# Patient Record
Sex: FEMALE | Race: WHITE | NOT HISPANIC OR LATINO | Employment: OTHER | URBAN - METROPOLITAN AREA
[De-identification: names, ages, dates, MRNs, and addresses within clinical notes are randomized per-mention and may not be internally consistent; named-entity substitution may affect disease eponyms.]

---

## 2017-03-07 ENCOUNTER — OFFICE VISIT (OUTPATIENT)
Dept: FAMILY MEDICINE | Facility: CLINIC | Age: 62
End: 2017-03-07
Payer: COMMERCIAL

## 2017-03-07 VITALS
SYSTOLIC BLOOD PRESSURE: 136 MMHG | BODY MASS INDEX: 31.01 KG/M2 | HEIGHT: 63 IN | TEMPERATURE: 97.4 F | OXYGEN SATURATION: 99 % | RESPIRATION RATE: 18 BRPM | HEART RATE: 58 BPM | DIASTOLIC BLOOD PRESSURE: 82 MMHG | WEIGHT: 175 LBS

## 2017-03-07 DIAGNOSIS — J20.9 ACUTE BRONCHITIS, UNSPECIFIED ORGANISM: Primary | ICD-10-CM

## 2017-03-07 DIAGNOSIS — L30.8 OTHER ECZEMA: ICD-10-CM

## 2017-03-07 DIAGNOSIS — Z87.891 PERSONAL HISTORY OF TOBACCO USE, PRESENTING HAZARDS TO HEALTH: ICD-10-CM

## 2017-03-07 DIAGNOSIS — E78.00 HYPERCHOLESTEROLEMIA: ICD-10-CM

## 2017-03-07 DIAGNOSIS — J45.20 MILD INTERMITTENT ASTHMA, UNCOMPLICATED: Chronic | ICD-10-CM

## 2017-03-07 DIAGNOSIS — N18.30 CKD (CHRONIC KIDNEY DISEASE) STAGE 3, GFR 30-59 ML/MIN (H): ICD-10-CM

## 2017-03-07 DIAGNOSIS — E66.09 NON MORBID OBESITY DUE TO EXCESS CALORIES: ICD-10-CM

## 2017-03-07 DIAGNOSIS — F32.5 MAJOR DEPRESSION IN COMPLETE REMISSION (H): Chronic | ICD-10-CM

## 2017-03-07 PROCEDURE — 99214 OFFICE O/P EST MOD 30 MIN: CPT | Performed by: FAMILY MEDICINE

## 2017-03-07 NOTE — NURSING NOTE
"Chief Complaint   Patient presents with     URI     Recheck Medication     /82 (BP Location: Left arm, Patient Position: Chair, Cuff Size: Adult Regular)  Pulse 58  Temp 97.4  F (36.3  C) (Tympanic)  Resp 18  Ht 5' 3\" (1.6 m)  Wt 175 lb (79.4 kg)  LMP  (LMP Unknown)  SpO2 99%  Breastfeeding? No  BMI 31 kg/m2 Estimated body mass index is 31 kg/(m^2) as calculated from the following:    Height as of this encounter: 5' 3\" (1.6 m).    Weight as of this encounter: 175 lb (79.4 kg).  BP completed using cuff size: regular   Jenna Covington CMA    Health Maintenance Due   Topic Date Due     URINE DRUG SCREEN Q1 YR  [birth year]/04/1970     PHQ-9 Q6 MONTHS (NO INBASKET)  02/21/2017     ASTHMA CONTROL TEST Q6 MOS (NO INBASKET)  02/21/2017     Health Maintenance reviewed at today's visit patient asked to schedule/complete:   Asthma:  Patient agrees to schedule  Depression:  Patient agrees to schedule    "

## 2017-03-07 NOTE — PROGRESS NOTES
SUBJECTIVE:                                                    Ilana Shin is a 61 year old female who presents to clinic today for the following health issues:    ENT Symptoms             Symptoms: cc Present Absent Comment   Fever/Chills   x    Fatigue   x    Muscle Aches   x    Eye Irritation   x    Sneezing   x    Nasal Rosalio/Drg   x    Sinus Pressure/Pain   x    Loss of smell   x    Dental pain   x    Sore Throat   x    Swollen Glands   x    Ear Pain/Fullness   x    Cough  x  Am coryza    Wheeze   x    Chest Pain   x    Shortness of breath   x    Rash   x    Other   x      Symptom duration:  x 10 weeks left with only a cough after the 1st 2 weeks ; is getting better    Symptom severity:  Mild   Treatments tried:  OCT Decongestant; flonase    Contacts:  None     Medication Followup of Ambien    Taking Medication as prescribed: yes    Side Effects:  None    Medication Helping Symptoms:  yes     Asthma Follow-Up    Was ACT completed today?    Yes    ACT Total Scores 7/21/2015                                      3-7-17   ACT TOTAL SCORE 25                                          20   ASTHMA ER VISITS 0 = None   ASTHMA HOSPITALIZATIONS 0 = None     Counseling given: No        TOBACCO ABUSE    -[age]y/o at 1ppd   -30 pk yr   Brother who smoked is dying of lung ca        Amount of exercise or physical activity: work is physical, 13,000 steps a day    Problems taking medications regularly: No    Medication side effects: memory issues    Diet: regular (no restrictions)    Rash:Eczema of ext ear canal       Duration: yrs off& on    Description  Location: above  Itching: mild    Intensity:  moderate    Accompanying signs and symptoms: None    History (similar episodes/previous evaluation): None    Precipitating or alleviating factors:  New exposures:  None  Recent travel: no      Therapies tried and outcome: none     Hyperlipidemia:LDL Follow-Up      Rate your low fat/cholesterol diet?: not monitoring fat    Taking  "statin?  No    Other lipid medications/supplements?:  none       Depression and Anxiety Follow-Up      Status since last visit: No change-inremisson    Other associated symptoms:None    Complicating factors:     Significant life event: No     Current substance abuse: None    PHQ-9 SCORE 12/21/2015 10/24/2016 3/7/2017   Total Score - - -   Total Score 5 5 0     No flowsheet data found.     PHQ-9  English      PHQ-9   Any Language     GAD7     Chronic Kidney Disease:Stage 3  Follow-up      Current NSAID use?  No    NONMORBID OBESITY    -sedentary   -has CKD, hi LDL     Problem list and histories reviewed & adjusted, as indicated.  Additional history: as documented    Labs reviewed in EPIC    Reviewed and updated as needed this visit by clinical staff       Reviewed and updated as needed this visit by Provider       ROS:  C: NEGATIVE for fever, chills, change in weight  INTEGUMENTARY/SKIN: POSITIVE for  and rash scaley , red at ear canal openings   E: NEGATIVE for vision changes or irritation  E/M: NEGATIVE for ear, mouth and throat problems  RESP:POSITIVE for  and cough-productive  B: NEGATIVE for masses, tenderness or discharge  CV: NEGATIVE for chest pain, palpitations or peripheral edema  GI: NEGATIVE for nausea, abdominal pain, heartburn, or change in bowel habits  : NEGATIVE for frequency, dysuria, or hematuria  M: NEGATIVE for significant arthralgias or myalgia  N: NEGATIVE for weakness, dizziness or paresthesias  E: NEGATIVE for temperature intolerance, skin/hair changes  H: NEGATIVE for bleeding problems  P: NEGATIVE for changes in mood or affect    OBJECTIVE:                                                    /82 (BP Location: Left arm, Patient Position: Chair, Cuff Size: Adult Regular)  Pulse 58  Temp 97.4  F (36.3  C) (Tympanic)  Resp 18  Ht 5' 3\" (1.6 m)  Wt 175 lb (79.4 kg)  LMP  (LMP Unknown)  SpO2 99%  Breastfeeding? No  BMI 31 kg/m2  Body mass index is 31 kg/(m^2).  GENERAL: healthy, alert and " no distress;obese  EYES: Eyes grossly normal to inspection, PERRL and conjunctivae and sclerae normal  HENT: ear canals and TM's normal, nose and mouth without ulcers or lesions  NECK: no adenopathy, no asymmetry, masses, or scars and thyroid normal to palpation  RESP: lungs clear to auscultation - no rales, rhonchi or wheezes  CV: regular rate and rhythm, normal S1 S2, no S3 or S4, no murmur, click or rub, no peripheral edema and peripheral pulses strong  ABDOMEN: soft, nontender, no hepatosplenomegaly, no masses and bowel sounds normal  MS: no gross musculoskeletal defects noted, no edema  SKIN: no suspicious lesions or rashes--scaley red at openings of ear canals   NEURO: Normal strength and tone, mentation intact and speech normal  PSYCH: mentation appears normal, affect normal/bright    Diagnostic Test Results:  none      ASSESSMENT/PLAN:                                                              ICD-10-CM    1. Acute bronchitis, unspecified organism J20.9    2. Mild intermittent asthma, uncomplicated J45.20    3. Personal history of tobacco use, presenting hazards to health 1 ppd  [age]y/o=30 pk yr hx  Z87.891    4. Other eczema-ear canals L30.8    5. Major depression in complete remission (H) on meds  F32.5    6. Non morbid obesity due to excess calories s/po gastric bypss 2013 E66.09    7. CKD (chronic kidney disease) stage 3, GFR 30-59 ml/min N18.3    8. Hypercholesterolemia E78.00        Patient Instructions   1.  Weight Loss Tips  1. Do not eat after 6 hrs before your expected bedtime  2. Have your heaviest meal for breakfast, a slightly lighter meal at lunch and a snack 6 hrs before bed  3. No sugar/calorie drinks except milk ie no fruit juice, pop, alcohol.  4. Drink milk 30min before meals to decrease your hunger. Also it is excellent as part of your last meal of the day snack  5. Drink lots of water  6. Increase fiber in diet: all bran cereal, salads, popcorn etc  7. Have only one small serving of  fruit a day about 1/2 cup (as this is high in sugar)  8. EXERCISE is the bottom line. Without it, you will gain weight even on a low calorie diet. Best if done 2-3X a day as can    Being overweight contributes to high blood pressure and high cholesterol, both of which cause heart attacks, strokes and kidney failure, prediabetes and diabetes, arthritis, and liver disease     2. Boil water and breath the warm vapors 2-3 times a day to try to open up the sinuses. Run a steamer or cold air vaporizer as much as possible. Take Mucinex plain blue  1200 mg (This may come as 600mg/tablet and you need to take 2 tabs twice a day) twice a day. Mucinex is guaifenesin, the major component of most cough syrups, because it makes the mucus less thick, and therefore it drains out better and you are less likely to cough from it dripping on the back of your throat.  Irrigate the  nose with plain water under the kitchen sink faucet or the shower.  Melissa pots, spray bottles, etc accumulate bacteria and are not recommended.   The tickle in the throat is also helped by gargling with vinegar and honey mixture, or pop or mouth wash as these coat the throat.  Please try to rinse teeth with water after using these .     3. As you have a 30 pk yr hx smoking , chek with insurance re the cost of the low dose CT for lung cancer     4.Discussed all with pt  And the patient expresses understanding  That this is likely an exacerbation of the beginning of COPD  And not a cold or bacteria that is treatable     Has a 30 pk yr hx of smoking     Pt need s to chek on lo dose CT  For the risk of cancer     Will prob get better this summer  And may not be ill again till next winter      i would not chek  A spirometry now as would be abnormal as has the cough but would consider in the future when you are healthy to see what the lungs function is       Mckenzie Reyes MD  [facility]    Discussed all with pt  And the patient  expresses understanding  That this is likely an exacerbation of the beginning of COPD  And not a cold or bacteria that is treatable     Has a 30 pk yr hx of smoking   Unlikely this is asthma with her 30 yr smoking hx     Pt need s to chek on lo dose CT  For the risk of cancer     Will prob get better this summer  And may not be ill again till next winter      i would not chek  A spirometry now as would be abnormal as has the cough but would consider in the future when you are healthy to see what the lungs function is     Weight management plan: Discussed healthy diet and exercise guidelines and patient will follow up in 3 months in clinic to re-evaluate.    Mckenzie Reyes MD

## 2017-03-07 NOTE — MR AVS SNAPSHOT
After Visit Summary   3/7/2017    Ilana Shin    MRN: [id number]           Patient Information     Date Of Birth          1955        Visit Information        Provider Department      3/7/2017 10:00 AM Mckenzie Reyes MD [facility]        Today's Diagnoses     Major depression in complete remission (H) on meds     -  1    Non morbid obesity due to excess calories s/po gastric bypss 2013          Care Instructions    1.  Weight Loss Tips  1. Do not eat after 6 hrs before your expected bedtime  2. Have your heaviest meal for breakfast, a slightly lighter meal at lunch and a snack 6 hrs before bed  3. No sugar/calorie drinks except milk ie no fruit juice, pop, alcohol.  4. Drink milk 30min before meals to decrease your hunger. Also it is excellent as part of your last meal of the day snack  5. Drink lots of water  6. Increase fiber in diet: all bran cereal, salads, popcorn etc  7. Have only one small serving of fruit a day about 1/2 cup (as this is high in sugar)  8. EXERCISE is the bottom line. Without it, you will gain weight even on a low calorie diet. Best if done 2-3X a day as can    Being overweight contributes to high blood pressure and high cholesterol, both of which cause heart attacks, strokes and kidney failure, prediabetes and diabetes, arthritis, and liver disease     2. Boil water and breath the warm vapors 2-3 times a day to try to open up the sinuses. Run a steamer or cold air vaporizer as much as possible. Take Mucinex plain blue  1200 mg (This may come as 600mg/tablet and you need to take 2 tabs twice a day) twice a day. Mucinex is guaifenesin, the major component of most cough syrups, because it makes the mucus less thick, and therefore it drains out better and you are less likely to cough from it dripping on the back of your throat.  Irrigate the  nose with plain water under the kitchen sink faucet or the shower.  Melissa pots, spray  bottles, etc accumulate bacteria and are not recommended.   The tickle in the throat is also helped by gargling with vinegar and honey mixture, or pop or mouth wash as these coat the throat.  Please try to rinse teeth with water after using these .     3. As you have a 30 pk yr hx smoking , chek with insurance re the cost of the low dose CT for lung cancer     4.Discussed all with pt  And the patient expresses understanding  That this is likely an exacerbation of the beginning of COPD  And not a cold or bacteria that is treatable     Has a 30 pk yr hx of smoking     Pt need s to chek on lo dose CT  For the risk of cancer     Will prob get better this summer  And may not be ill again till next winter      i would not chek  A spirometry now as would be abnormal as has the cough but would consider in the future when you are healthy to see what the lungs function is         Follow-ups after your visit        Who to contact     If you have questions or need follow up information about today's clinic visit or your schedule please contact [facility] directly at [phone].  Normal or non-critical lab and imaging results will be communicated to you by Vinylminthart, letter or phone within 4 business days after the clinic has received the results. If you do not hear from us within 7 days, please contact the clinic through Trailerpopt or phone. If you have a critical or abnormal lab result, we will notify you by phone as soon as possible.  Submit refill requests through Multi[employer] or call your pharmacy and they will forward the refill request to us. Please allow 3 business days for your refill to be completed.          Additional Information About Your Visit        Multi[employer]om Information     [employer] gives you secure access to your electronic health record. If you see a primary care provider, you can also send messages to your care team and make appointments. If you have questions, please call your primary care  "clinic.  If you do not have a primary care provider, please call [phone] and they will assist you.        Care EveryWhere ID     This is your Care EveryWhere ID. This could be used by other organizations to access your [facility] medical records  [id number]        Your Vitals Were     Pulse Temperature Respirations Height Last Period Pulse Oximetry    58 97.4  F (36.3  C) (Tympanic) 18 5' 3\" (1.6 m) (LMP Unknown) 99%    Breastfeeding? BMI (Body Mass Index)                No 31 kg/m2           Blood Pressure from Last 3 Encounters:   03/07/17 136/82   12/06/16 138/75   06/20/16 136/90    Weight from Last 3 Encounters:   03/07/17 175 lb (79.4 kg)   12/06/16 175 lb (79.4 kg)   06/20/16 170 lb (77.1 kg)              Today, you had the following     No orders found for display       Primary Care Provider Office Phone # Fax #    [facility] [phone] [address] [facility] [address]        Thank you!     Thank you for choosing [facility]  for your care. Our goal is always to provide you with excellent care. Hearing back from our patients is one way we can continue to improve our services. Please take a few minutes to complete the written survey that you may receive in the mail after your visit with us. Thank you!             Your Updated Medication List - Protect others around you: Learn how to safely use, store and throw away your medicines at www.disposemymeds.org.          This list is accurate as of: 3/7/17 10:56 AM.  Always use your most recent med list.                   Brand Name Dispense Instructions for use    albuterol 108 (90 BASE) MCG/ACT Inhaler    PROAIR HFA/PROVENTIL HFA/VENTOLIN HFA    1 Inhaler    Inhale 1-2 puffs into the lungs every 4 hours as needed for shortness of breath / dyspnea       ALEVE PO          cyanocolbalamin 500 MCG tablet    vitamin  B-12     Take 2 tablets by mouth daily Chews       fluticasone 50 " MCG/ACT spray    FLONASE    1 Package    Spray 1-2 sprays into both nostrils every evening       levothyroxine 125 MCG tablet    SYNTHROID/LEVOTHROID    90 tablet    TAKE 1 TABLET BY MOUTH DAILY       vitamin D 2000 UNITS Caps      Take 1 capsule by mouth daily       zolpidem 5 MG tablet    AMBIEN    10 tablet    Take 1 tablet (5 mg) by mouth nightly as needed for sleep

## 2017-03-07 NOTE — PATIENT INSTRUCTIONS
1.  Weight Loss Tips  1. Do not eat after 6 hrs before your expected bedtime  2. Have your heaviest meal for breakfast, a slightly lighter meal at lunch and a snack 6 hrs before bed  3. No sugar/calorie drinks except milk ie no fruit juice, pop, alcohol.  4. Drink milk 30min before meals to decrease your hunger. Also it is excellent as part of your last meal of the day snack  5. Drink lots of water  6. Increase fiber in diet: all bran cereal, salads, popcorn etc  7. Have only one small serving of fruit a day about 1/2 cup (as this is high in sugar)  8. EXERCISE is the bottom line. Without it, you will gain weight even on a low calorie diet. Best if done 2-3X a day as can    Being overweight contributes to high blood pressure and high cholesterol, both of which cause heart attacks, strokes and kidney failure, prediabetes and diabetes, arthritis, and liver disease     2. Boil water and breath the warm vapors 2-3 times a day to try to open up the sinuses. Run a steamer or cold air vaporizer as much as possible. Take Mucinex plain blue  1200 mg (This may come as 600mg/tablet and you need to take 2 tabs twice a day) twice a day. Mucinex is guaifenesin, the major component of most cough syrups, because it makes the mucus less thick, and therefore it drains out better and you are less likely to cough from it dripping on the back of your throat.  Irrigate the  nose with plain water under the kitchen sink faucet or the shower.  Melissa pots, spray bottles, etc accumulate bacteria and are not recommended.   The tickle in the throat is also helped by gargling with vinegar and honey mixture, or pop or mouth wash as these coat the throat.  Please try to rinse teeth with water after using these .     3. As you have a 30 pk yr hx smoking , chek with insurance re the cost of the low dose CT for lung cancer     4.Discussed all with pt  And the patient expresses understanding  That this is likely an exacerbation of the beginning of  COPD  And not a cold or bacteria that is treatable     Has a 30 pk yr hx of smoking     Pt need s to chek on lo dose CT  For the risk of cancer     Will prob get better this summer  And may not be ill again till next winter      i would not chek  A spirometry now as would be abnormal as has the cough but would consider in the future when you are healthy to see what the lungs function is

## 2017-03-08 ASSESSMENT — ASTHMA QUESTIONNAIRES: ACT_TOTALSCORE: 20

## 2017-03-08 ASSESSMENT — PATIENT HEALTH QUESTIONNAIRE - PHQ9: SUM OF ALL RESPONSES TO PHQ QUESTIONS 1-9: 0

## 2017-03-14 DIAGNOSIS — G47.00 INSOMNIA, UNSPECIFIED TYPE: ICD-10-CM

## 2017-03-16 RX ORDER — ZOLPIDEM TARTRATE 5 MG/1
TABLET ORAL
Qty: 10 TABLET | Refills: 2 | Status: SHIPPED | OUTPATIENT
Start: 2017-03-16 | End: 2017-03-27

## 2017-03-16 NOTE — TELEPHONE ENCOUNTER
zolpidem (AMBIEN) 5 MG tablet    Last Written Prescription Date:  12/06/02/16  Last Fill Quantity: 10,   # refills: 2  Last Office Visit with [facility] or [facility] prescribing provider: 03/07/2017  Future Office visit:       Routing refill request to provider for review/approval because:  Drug not on the [facility], [facility] or [facility] refill protocol or controlled substance

## 2017-03-27 ENCOUNTER — MYC MEDICAL ADVICE (OUTPATIENT)
Dept: FAMILY MEDICINE | Facility: CLINIC | Age: 62
End: 2017-03-27

## 2017-03-27 DIAGNOSIS — G47.00 INSOMNIA, UNSPECIFIED TYPE: ICD-10-CM

## 2017-03-27 NOTE — TELEPHONE ENCOUNTER
Patient requests Ambien switched from 10 tabs a month to 30 tabs a month.     zolpidem (AMBIEN) 5 MG tablet    Last Written Prescription Date:  3/16/17  Last Fill Quantity: 10,   # refills: 2  Last Office Visit with [facility], [facility] or [facility] prescribing provider: 3/7/17   Future Office visit:       Routing refill request to provider for review/approval because:  Drug not on the [facility], [facility] or [facility] refill protocol or controlled substance

## 2017-03-28 RX ORDER — ZOLPIDEM TARTRATE 5 MG/1
5 TABLET ORAL
Qty: 30 TABLET | Refills: 2 | Status: SHIPPED | OUTPATIENT
Start: 2017-03-28 | End: 2017-06-26

## 2017-04-28 ENCOUNTER — OFFICE VISIT (OUTPATIENT)
Dept: URGENT CARE | Facility: URGENT CARE | Age: 62
End: 2017-04-28
Payer: COMMERCIAL

## 2017-04-28 VITALS
WEIGHT: 175 LBS | BODY MASS INDEX: 31 KG/M2 | OXYGEN SATURATION: 100 % | HEART RATE: 59 BPM | SYSTOLIC BLOOD PRESSURE: 150 MMHG | DIASTOLIC BLOOD PRESSURE: 70 MMHG | TEMPERATURE: 98.6 F

## 2017-04-28 DIAGNOSIS — J01.90 ACUTE SINUSITIS WITH SYMPTOMS > 10 DAYS: Primary | ICD-10-CM

## 2017-04-28 DIAGNOSIS — J45.909 UNCOMPLICATED ASTHMA, UNSPECIFIED ASTHMA SEVERITY: ICD-10-CM

## 2017-04-28 DIAGNOSIS — J20.9 ACUTE BRONCHITIS WITH SYMPTOMS > 10 DAYS: ICD-10-CM

## 2017-04-28 PROCEDURE — 99213 OFFICE O/P EST LOW 20 MIN: CPT | Performed by: FAMILY MEDICINE

## 2017-04-28 RX ORDER — AZITHROMYCIN 250 MG/1
TABLET, FILM COATED ORAL
Qty: 6 TABLET | Refills: 0 | Status: SHIPPED | OUTPATIENT
Start: 2017-04-28 | End: 2017-05-03

## 2017-04-28 NOTE — PROGRESS NOTES
SUBJECTIVE: Ilana Shin is a 61 year old female here with concerns about sinus infection.  She states onset  of symptoms were greater than 10 days with sinus pressure and drainage.  Course of illness is worsening.    Severity: moderate  Current and Associated symptoms: cold symptoms  Predisposing factors include none.   Recent treatment has included: OTC's  Allergic symptoms include: none    Past Medical History:   Diagnosis Date     Conductive hearing loss, bilateral     chronic since 2010 right ear     Fibromyalgia 3/28/2013     Insomnia, unspecified     whole life     Major depression      Mitral valve stenosis and aortic valve stenosis 9/22/2012     S/P gastric bypass 2006     Vitamin D deficiency 3/30/2013     Allergies   Allergen Reactions     Codeine Sulfate GI Disturbance     Social History   Substance Use Topics     Smoking status: Former Smoker     Smokeless tobacco: Former User     Quit date: 1/1/2007     Alcohol use No       ROS:   no rash  no vomiting    OBJECTIVE:  /70 (BP Location: Right arm, Patient Position: Chair, Cuff Size: Adult Regular)  Pulse 59  Temp 98.6  F (37  C) (Oral)  Wt 175 lb (79.4 kg)  LMP  (LMP Unknown)  SpO2 100%  BMI 31 kg/m2  NAD  EYES: clear, no mattering  EARS: TM's clear, no redness/buldging, canals clear, no swelling/redness  NOSE: discolored discharge jimy. Nares  THROAT: clear, no erythema, no exudate  SINUS: maxillary tenderness with palpation  LUNGS: CTAB, no rhonchi/wheeze/rales      ICD-10-CM    1. Acute sinusitis with symptoms > 10 days J01.90 azithromycin (ZITHROMAX) 250 MG tablet       Follow up with primary clinic if not improving

## 2017-04-28 NOTE — MR AVS SNAPSHOT
After Visit Summary   4/28/2017    Ilana Shin    MRN: [id number]           Patient Information     Date Of Birth          1955        Visit Information        Provider Department      4/28/2017 1:15 PM Sreekanth Fried DO [facility]        Today's Diagnoses     Acute sinusitis with symptoms > 10 days    -  1    Uncomplicated asthma, unspecified asthma severity        Acute bronchitis with symptoms > 10 days           Follow-ups after your visit        Who to contact     If you have questions or need follow up information about today's clinic visit or your schedule please contact [facility] directly at [phone].  Normal or non-critical lab and imaging results will be communicated to you by MyChart, letter or phone within 4 business days after the clinic has received the results. If you do not hear from us within 7 days, please contact the clinic through "Mobile Location, IP"hart or phone. If you have a critical or abnormal lab result, we will notify you by phone as soon as possible.  Submit refill requests through [employer] or call your pharmacy and they will forward the refill request to us. Please allow 3 business days for your refill to be completed.          Additional Information About Your Visit        MyChart Information     [employer] gives you secure access to your electronic health record. If you see a primary care provider, you can also send messages to your care team and make appointments. If you have questions, please call your primary care clinic.  If you do not have a primary care provider, please call [phone] and they will assist you.        Care EveryWhere ID     This is your Care EveryWhere ID. This could be used by other organizations to access your [place] medical records  [id number]        Your Vitals Were     Pulse Temperature Last Period Pulse Oximetry BMI (Body Mass Index)       59 98.6  F (37  C) (Oral) (LMP Unknown) 100% 31  kg/m2        Blood Pressure from Last 3 Encounters:   04/28/17 150/70   03/07/17 136/82   12/06/16 138/75    Weight from Last 3 Encounters:   04/28/17 175 lb (79.4 kg)   03/07/17 175 lb (79.4 kg)   12/06/16 175 lb (79.4 kg)              Today, you had the following     No orders found for display         Today's Medication Changes          These changes are accurate as of: 4/28/17  1:38 PM.  If you have any questions, ask your nurse or doctor.               Start taking these medicines.        Dose/Directions    azithromycin 250 MG tablet   Commonly known as:  ZITHROMAX   Used for:  Acute sinusitis with symptoms > 10 days   Started by:  Sreekanth Fried, DO        Two tablets first day, then one tablet daily for four days.   Quantity:  6 tablet   Refills:  0       fluticasone-salmeterol 250-50 MCG/DOSE diskus inhaler   Commonly known as:  ADVAIR   Used for:  Uncomplicated asthma, unspecified asthma severity, Acute bronchitis with symptoms > 10 days   Started by:  Sreekanth Fried,         Dose:  1 puff   Inhale 1 puff into the lungs 2 times daily   Quantity:  1 Inhaler   Refills:  0            Where to get your medicines      These medications were sent to [facility] [id number] [place] [address] [address] AT [place] [id number] [facility] [address]    Hours:  24-hours Phone:  [phone]     azithromycin 250 MG tablet    fluticasone-salmeterol 250-50 MCG/DOSE diskus inhaler                Primary Care Provider Office Phone # Fax #    [facility] [phone] [address] [facility] [address]        Thank you!     Thank you for choosing [facility]  for your care. Our goal is always to provide you with excellent care. Hearing back from our patients is one way we can continue to improve our services. Please take a few minutes to complete the written survey that you may receive in the mail after your  visit with us. Thank you!             Your Updated Medication List - Protect others around you: Learn how to safely use, store and throw away your medicines at www.disposemymeds.org.          This list is accurate as of: 4/28/17  1:38 PM.  Always use your most recent med list.                   Brand Name Dispense Instructions for use    albuterol 108 (90 BASE) MCG/ACT Inhaler    PROAIR HFA/PROVENTIL HFA/VENTOLIN HFA    1 Inhaler    Inhale 1-2 puffs into the lungs every 4 hours as needed for shortness of breath / dyspnea       ALEVE PO      Reported on 4/28/2017       azithromycin 250 MG tablet    ZITHROMAX    6 tablet    Two tablets first day, then one tablet daily for four days.       cyanocolbalamin 500 MCG tablet    vitamin  B-12     Take 2 tablets by mouth daily Chews       fluticasone 50 MCG/ACT spray    FLONASE    1 Package    Spray 1-2 sprays into both nostrils every evening       fluticasone-salmeterol 250-50 MCG/DOSE diskus inhaler    ADVAIR    1 Inhaler    Inhale 1 puff into the lungs 2 times daily       levothyroxine 125 MCG tablet    SYNTHROID/LEVOTHROID    90 tablet    TAKE 1 TABLET BY MOUTH DAILY       vitamin D 2000 UNITS Caps      Take 1 capsule by mouth daily       zolpidem 5 MG tablet    AMBIEN    30 tablet    Take 1 tablet (5 mg) by mouth nightly as needed

## 2017-05-30 DIAGNOSIS — J31.0 CHRONIC RHINITIS: ICD-10-CM

## 2017-05-31 RX ORDER — FLUTICASONE PROPIONATE 50 MCG
SPRAY, SUSPENSION (ML) NASAL
Qty: 16 ML | Refills: 9 | Status: SHIPPED | OUTPATIENT
Start: 2017-05-31 | End: 2017-10-05

## 2017-05-31 NOTE — TELEPHONE ENCOUNTER
FLUTICASONE 50MCG JOSÉ SP (120SP)RX    Last Written Prescription Date: 07/21/2015  Last Fill Quantity: 1,  # refills: 11   Last Office Visit with [facility], [facility] or [facility] prescribing provider: 03/17/2017

## 2017-06-08 ENCOUNTER — TELEPHONE (OUTPATIENT)
Dept: FAMILY MEDICINE | Facility: CLINIC | Age: 62
End: 2017-06-08

## 2017-06-08 DIAGNOSIS — E66.09 NON MORBID OBESITY DUE TO EXCESS CALORIES: Primary | ICD-10-CM

## 2017-06-08 DIAGNOSIS — N18.30 CKD (CHRONIC KIDNEY DISEASE) STAGE 3, GFR 30-59 ML/MIN (H): ICD-10-CM

## 2017-06-08 DIAGNOSIS — E78.00 HYPERCHOLESTEROLEMIA: ICD-10-CM

## 2017-06-08 DIAGNOSIS — E03.9 HYPOTHYROIDISM, UNSPECIFIED TYPE: ICD-10-CM

## 2017-06-08 LAB
ANION GAP SERPL CALCULATED.3IONS-SCNC: 10 MMOL/L (ref 3–14)
BUN SERPL-MCNC: 10 MG/DL (ref 7–30)
CALCIUM SERPL-MCNC: 8.7 MG/DL (ref 8.5–10.1)
CHLORIDE SERPL-SCNC: 109 MMOL/L (ref 94–109)
CHOLEST SERPL-MCNC: 184 MG/DL
CO2 SERPL-SCNC: 22 MMOL/L (ref 20–32)
CREAT SERPL-MCNC: 0.94 MG/DL (ref 0.52–1.04)
GFR SERPL CREATININE-BSD FRML MDRD: 60 ML/MIN/1.7M2
GLUCOSE SERPL-MCNC: 98 MG/DL (ref 70–99)
HDLC SERPL-MCNC: 75 MG/DL
LDLC SERPL CALC-MCNC: 94 MG/DL
NONHDLC SERPL-MCNC: 109 MG/DL
POTASSIUM SERPL-SCNC: 3.6 MMOL/L (ref 3.4–5.3)
SODIUM SERPL-SCNC: 141 MMOL/L (ref 133–144)
T4 FREE SERPL-MCNC: 1.35 NG/DL (ref 0.76–1.46)
TRIGL SERPL-MCNC: 76 MG/DL
TSH SERPL DL<=0.005 MIU/L-ACNC: 0.06 MU/L (ref 0.4–4)

## 2017-06-08 PROCEDURE — 80061 LIPID PANEL: CPT | Performed by: PHYSICIAN ASSISTANT

## 2017-06-08 PROCEDURE — 84439 ASSAY OF FREE THYROXINE: CPT | Performed by: PHYSICIAN ASSISTANT

## 2017-06-08 PROCEDURE — 36415 COLL VENOUS BLD VENIPUNCTURE: CPT | Performed by: PHYSICIAN ASSISTANT

## 2017-06-08 PROCEDURE — 80048 BASIC METABOLIC PNL TOTAL CA: CPT | Performed by: PHYSICIAN ASSISTANT

## 2017-06-08 PROCEDURE — 84443 ASSAY THYROID STIM HORMONE: CPT | Performed by: PHYSICIAN ASSISTANT

## 2017-06-08 NOTE — TELEPHONE ENCOUNTER
Joni drew a lipid and basic on this patient when she came in today, and was wondering if you could order these for her. Please advise, thanks!

## 2017-06-09 ENCOUNTER — TELEPHONE (OUTPATIENT)
Dept: FAMILY MEDICINE | Facility: CLINIC | Age: 62
End: 2017-06-09

## 2017-06-09 DIAGNOSIS — E03.4 HYPOTHYROIDISM DUE TO ACQUIRED ATROPHY OF THYROID: Primary | ICD-10-CM

## 2017-06-09 NOTE — TELEPHONE ENCOUNTER
Ilana's labs are back and she has improved cholesterol, improved kidney function though is still slightly low. Her TSH is low, suggesting her dose of levothyroxine may be too high. I recommend reducing that and rechecking in 6-8 weeks. Nicole Joy Siegler, PA-C

## 2017-06-09 NOTE — TELEPHONE ENCOUNTER
I spoke with Ilana and did give her the option to maintain her dose if she felt well vs reduce and recheck; she would like to continue with her current dose and recheck in 3-6 months. Future orders provided. Nicole Joy Siegler, PA-C

## 2017-06-09 NOTE — TELEPHONE ENCOUNTER
I LVM for Ilana to call us back so we can discuss her labs and reduce her levothyroxine dose. Nicole Joy Siegler, PA-C

## 2017-06-09 NOTE — LETTER
[facility]  [address]  [facility] [address] [phone]                                                                                                           Ilana Shin  [address]  [facility] [address]    June 9, 2017      Dear Ilana,    Your recent labs are below. Continue with current plans as discussed via telephone.     Results for orders placed or performed in visit on 06/08/17   **TSH with free T4 reflex FUTURE anytime   Result Value Ref Range    TSH 0.06 (L) 0.40 - 4.00 mU/L   Lipid Profile with reflex to direct LDL   Result Value Ref Range    Cholesterol 184 <200 mg/dL    Triglycerides 76 <150 mg/dL    HDL Cholesterol 75 >49 mg/dL    LDL Cholesterol Calculated 94 <100 mg/dL    Non HDL Cholesterol 109 <130 mg/dL   Basic metabolic panel  (Ca, Cl, CO2, Creat, Gluc, K, Na, BUN)   Result Value Ref Range    Sodium 141 133 - 144 mmol/L    Potassium 3.6 3.4 - 5.3 mmol/L    Chloride 109 94 - 109 mmol/L    Carbon Dioxide 22 20 - 32 mmol/L    Anion Gap 10 3 - 14 mmol/L    Glucose 98 70 - 99 mg/dL    Urea Nitrogen 10 7 - 30 mg/dL    Creatinine 0.94 0.52 - 1.04 mg/dL    GFR Estimate 60 (L) >60 mL/min/1.7m2    GFR Estimate If Black 73 >60 mL/min/1.7m2    Calcium 8.7 8.5 - 10.1 mg/dL   T4 free   Result Value Ref Range    T4 Free 1.35 0.76 - 1.46 ng/dL                   Thank you for choosing [facility].  We appreciate the opportunity to serve you and look forward to supporting your healthcare needs in the future.    If you have any questions or concerns, please call me or my staff at [phone].      Sincerely,        Nicole Joy Siegler, PA-C

## 2017-06-26 DIAGNOSIS — G47.00 INSOMNIA, UNSPECIFIED TYPE: ICD-10-CM

## 2017-06-27 RX ORDER — ZOLPIDEM TARTRATE 5 MG/1
TABLET ORAL
Qty: 30 TABLET | Refills: 0 | Status: SHIPPED | OUTPATIENT
Start: 2017-06-27 | End: 2017-07-29

## 2017-06-27 NOTE — TELEPHONE ENCOUNTER
ZOLPIDEM 5MG TABLETS    Last Written Prescription Date:  03282-17  Last Fill Quantity: 30,   # refills: 2  Last Office Visit with [facility], [facility] or [facility] prescribing provider: 03/07/2017  Future Office visit:       Routing refill request to provider for review/approval because:  Drug not on the [facility], [facility] or [facility] refill protocol or controlled substance

## 2017-06-27 NOTE — TELEPHONE ENCOUNTER
Per my visit with her on 12/2016: Discussed ambien use as not a long term solution and unsafe for daily use based upon her age and lifestyle. She will use <10 tabs per month when she is able to sleep 8-10 hours. She is aware of hyponotic side effects and potential for falls and sleep walking. She is also aware that it would be unsafe to take within 8 hours of driving or caring for a child. She agrees to return if she requires further refill  Beyond the #10 +2 refills and that it should NOT be prior to 3 months from now.     If she is requiring this medication daily that is a change from above. I would like more information about why she is now using this daily. This is not a safe plan to use daily long term.     I will fill #30 pills with zero refills pending the above update via telephone or in person.   Thanks  Nicole Joy Siegler, PA-C

## 2017-07-29 DIAGNOSIS — G47.00 INSOMNIA, UNSPECIFIED TYPE: ICD-10-CM

## 2017-07-31 NOTE — TELEPHONE ENCOUNTER
Zolpidem 5 mg      Last Written Prescription Date:  6/27/17  Last Fill Quantity: 30,   # refills: 0  Last Office Visit with [facility], [facility] or  [facility] prescribing provider: 3/7/17  Future Office visit:       Routing refill request to provider for review/approval because:  Drug not on the [facility], [facility] or  [facility] refill protocol or controlled substance

## 2017-08-01 NOTE — TELEPHONE ENCOUNTER
Controlled Substance Refill Request for zolpidem (AMBIEN) 5 MG tablet  Problem List Complete:  No     PROVIDER TO CONSIDER COMPLETION OF PROBLEM LIST AND OVERVIEW/CONTROLLED SUBSTANCE AGREEMENT    Controlled substance agreement on file: No.     Processing:  Fax Rx to [facility] pharmacy     checked in past 6 months?  No, route to RN     [place] reviewed:  reviewed- no concerns    [place]  [email or web address]

## 2017-08-02 RX ORDER — ZOLPIDEM TARTRATE 5 MG/1
TABLET ORAL
Qty: 30 TABLET | Refills: 0 | Status: SHIPPED | OUTPATIENT
Start: 2017-08-02 | End: 2017-09-01

## 2017-08-02 NOTE — TELEPHONE ENCOUNTER
Please notify her that she should follow up with one of us before she needs another refill, her use seems to be increasing? Just want to have her sign a CSA and make an appropriate plan with a provider.   Thanks! Nicole Joy Siegler, PA-C

## 2017-08-02 NOTE — TELEPHONE ENCOUNTER
Patient was notified of need for med check appointment before next refill. Script was faxed to the pharmacy as requested.    Melissa Carrero LPN

## 2017-08-10 DIAGNOSIS — E03.9 HYPOTHYROIDISM, UNSPECIFIED TYPE: ICD-10-CM

## 2017-08-10 RX ORDER — LEVOTHYROXINE SODIUM 125 UG/1
TABLET ORAL
Qty: 90 TABLET | Refills: 0 | Status: SHIPPED | OUTPATIENT
Start: 2017-08-10 | End: 2017-10-12

## 2017-08-10 NOTE — TELEPHONE ENCOUNTER
levothyroxine (SYNTHROID/LEVOTHROID) 125 MCG tablet     Last Written Prescription Date: 5/15/17  Last Quantity: 90, # refills: 0  Last Office Visit with G, P or [facility] prescribing provider: 3/7/17   Next 5 appointments (look out 90 days)     Aug 15, 2017  8:15 AM CDT   [facility] Visit with [facility]ma Rodgers MD   [facility]    [address] [address] [phone]                   TSH   Date Value Ref Range Status   06/08/2017 0.06 (L) 0.40 - 4.00 mU/L Final

## 2017-08-10 NOTE — TELEPHONE ENCOUNTER
Routing refill request to provider for review/approval because:  Labs out of range:  TSH 0.06   Labs not current:  06/08/2017  FYI-Patient has future appt scheduled 08/15/2017

## 2017-08-15 ENCOUNTER — OFFICE VISIT (OUTPATIENT)
Dept: INTERNAL MEDICINE | Facility: CLINIC | Age: 62
End: 2017-08-15
Payer: COMMERCIAL

## 2017-08-15 VITALS
DIASTOLIC BLOOD PRESSURE: 78 MMHG | HEART RATE: 46 BPM | BODY MASS INDEX: 29.97 KG/M2 | OXYGEN SATURATION: 98 % | TEMPERATURE: 97.8 F | WEIGHT: 169.2 LBS | SYSTOLIC BLOOD PRESSURE: 165 MMHG

## 2017-08-15 DIAGNOSIS — I10 BENIGN ESSENTIAL HYPERTENSION: ICD-10-CM

## 2017-08-15 DIAGNOSIS — Z76.89 ENCOUNTER TO ESTABLISH CARE: Primary | ICD-10-CM

## 2017-08-15 DIAGNOSIS — Z12.39 SCREENING FOR BREAST CANCER: ICD-10-CM

## 2017-08-15 DIAGNOSIS — Z98.84 S/P GASTRIC BYPASS: ICD-10-CM

## 2017-08-15 DIAGNOSIS — Z12.11 SCREEN FOR COLON CANCER: ICD-10-CM

## 2017-08-15 DIAGNOSIS — E03.9 HYPOTHYROIDISM, UNSPECIFIED TYPE: ICD-10-CM

## 2017-08-15 DIAGNOSIS — Z78.0 ASYMPTOMATIC MENOPAUSE: ICD-10-CM

## 2017-08-15 DIAGNOSIS — Z23 NEED FOR VACCINATION: ICD-10-CM

## 2017-08-15 PROBLEM — E66.09 NON MORBID OBESITY DUE TO EXCESS CALORIES: Status: RESOLVED | Noted: 2017-03-07 | Resolved: 2017-08-15

## 2017-08-15 PROBLEM — Z87.891 PERSONAL HISTORY OF TOBACCO USE, PRESENTING HAZARDS TO HEALTH: Status: RESOLVED | Noted: 2017-03-07 | Resolved: 2017-08-15

## 2017-08-15 PROBLEM — N18.30 CKD (CHRONIC KIDNEY DISEASE) STAGE 3, GFR 30-59 ML/MIN (H): Status: RESOLVED | Noted: 2017-03-07 | Resolved: 2017-08-15

## 2017-08-15 PROBLEM — L30.8 OTHER ECZEMA: Status: RESOLVED | Noted: 2017-03-07 | Resolved: 2017-08-15

## 2017-08-15 PROCEDURE — 90471 IMMUNIZATION ADMIN: CPT | Performed by: INTERNAL MEDICINE

## 2017-08-15 PROCEDURE — 99214 OFFICE O/P EST MOD 30 MIN: CPT | Mod: 25 | Performed by: INTERNAL MEDICINE

## 2017-08-15 PROCEDURE — 90736 HZV VACCINE LIVE SUBQ: CPT | Performed by: INTERNAL MEDICINE

## 2017-08-15 RX ORDER — ZOLPIDEM TARTRATE 5 MG/1
TABLET ORAL
Qty: 30 TABLET | Refills: 0 | Status: CANCELLED | OUTPATIENT
Start: 2017-08-15

## 2017-08-15 RX ORDER — LEVOTHYROXINE SODIUM 100 UG/1
100 TABLET ORAL DAILY
Qty: 30 TABLET | Refills: 1 | Status: SHIPPED | OUTPATIENT
Start: 2017-08-15 | End: 2017-10-12

## 2017-08-15 RX ORDER — LOSARTAN POTASSIUM 100 MG/1
100 TABLET ORAL DAILY
Qty: 30 TABLET | Refills: 1 | Status: SHIPPED | OUTPATIENT
Start: 2017-08-15 | End: 2017-10-05

## 2017-08-15 NOTE — PATIENT INSTRUCTIONS
Shingles vaccine today.    ---    Please schedule mammogram and bone scan on your way out at the  (pink sheet).    ---    Please  stool kit at lab downstairs (orange sheet).    ---    STOP levothyroxine 125mcg.    START levothyroxine 100mcg daily. Prescription sent to pharmacy.     Follow-up thyroid tests in ~6 weeks.     ---    Will also check iron levels, B12, and vitamin D levels with thyroid levels.     ---    START Losartan 100mg daily. Prescription sent to pharmacy.     Follow-up in 2-4 weeks for blood pressure follow-up.

## 2017-08-15 NOTE — MR AVS SNAPSHOT
After Visit Summary   8/15/2017    Ilana Shin    MRN: [id number]           Patient Information     Date Of Birth          1955        Visit Information        Provider Department      8/15/2017 8:15 AM Prema Rodgers MD [facility]        Today's Diagnoses     Need for vaccination    -  1    Screen for colon cancer        Screening for breast cancer        Asymptomatic menopause        Hypothyroidism, unspecified type        S/P gastric bypass        Benign essential hypertension          Care Instructions    Shingles vaccine today.    ---    Please schedule mammogram and bone scan on your way out at the  (pink sheet).    ---    Please  stool kit at lab downstairs (orange sheet).    ---    STOP levothyroxine 125mcg.    START levothyroxine 100mcg daily. Prescription sent to pharmacy.     Follow-up thyroid tests in ~6 weeks.     ---    Will also check iron levels, B12, and vitamin D levels with thyroid levels.     ---    START Losartan 100mg daily. Prescription sent to pharmacy.     Follow-up in 2-4 weeks for blood pressure follow-up.           Follow-ups after your visit        Future tests that were ordered for you today     Open Future Orders        Priority Expected Expires Ordered    CBC with platelets Routine  8/15/2018 8/15/2017    Vitamin B12 Routine  8/15/2018 8/15/2017    Vitamin D Deficiency Routine  8/15/2018 8/15/2017    Ferritin Routine  8/15/2018 8/15/2017    Iron and iron binding capacity Routine  8/15/2018 8/15/2017    DX Hip/Pelvis/Spine Routine  8/16/2018 8/15/2017    MA Screening Digital Bilateral Routine  8/16/2018 8/15/2017    Fecal colorectal cancer screen (FIT) Routine 9/5/2017 11/7/2017 8/15/2017            Who to contact     If you have questions or need follow up information about today's clinic visit or your schedule please contact [facility] directly at [phone].  Normal or non-critical lab and  imaging results will be communicated to you by [employer], letter or phone within 4 business days after the clinic has received the results. If you do not hear from us within 7 days, please contact the clinic through [employer] or phone. If you have a critical or abnormal lab result, we will notify you by phone as soon as possible.  Submit refill requests through [employer] or call your pharmacy and they will forward the refill request to us. Please allow 3 business days for your refill to be completed.          Additional Information About Your Visit        [employer] Information     [employer] gives you secure access to your electronic health record. If you see a primary care provider, you can also send messages to your care team and make appointments. If you have questions, please call your primary care clinic.  If you do not have a primary care provider, please call [phone] and they will assist you.        Care EveryWhere ID     This is your Care EveryWhere ID. This could be used by other organizations to access your [place] medical records  [id number]        Your Vitals Were     Pulse Temperature Last Period Pulse Oximetry BMI (Body Mass Index)       46 97.8  F (36.6  C) (Oral) (LMP Unknown) 98% 29.97 kg/m2        Blood Pressure from Last 3 Encounters:   08/15/17 165/78   04/28/17 150/70   03/07/17 136/82    Weight from Last 3 Encounters:   08/15/17 169 lb 3.2 oz (76.7 kg)   04/28/17 175 lb (79.4 kg)   03/07/17 175 lb (79.4 kg)              We Performed the Following     ZOSTER VACCINE LIVE SUB-Q NJX [84328]          Today's Medication Changes          These changes are accurate as of: 8/15/17  8:45 AM.  If you have any questions, ask your nurse or doctor.               Start taking these medicines.        Dose/Directions    losartan 100 MG tablet   Commonly known as:  COZAAR   Used for:  Benign essential hypertension   Started by:  Prema Rodgers MD        Dose:  100 mg   Take 1 tablet (100 mg) by mouth daily    Quantity:  30 tablet   Refills:  1         These medicines have changed or have updated prescriptions.        Dose/Directions    * levothyroxine 125 MCG tablet   Commonly known as:  SYNTHROID/LEVOTHROID   This may have changed:  Another medication with the same name was added. Make sure you understand how and when to take each.   Used for:  Hypothyroidism, unspecified type   Changed by:  Siegler, Nicole Joy, PA-C        TAKE 1 TABLET BY MOUTH ONCE DAILY   Quantity:  90 tablet   Refills:  0       * levothyroxine 100 MCG tablet   Commonly known as:  SYNTHROID/LEVOTHROID   This may have changed:  You were already taking a medication with the same name, and this prescription was added. Make sure you understand how and when to take each.   Used for:  Hypothyroidism, unspecified type   Changed by:  Prema Rodgers MD        Dose:  100 mcg   Take 1 tablet (100 mcg) by mouth daily   Quantity:  30 tablet   Refills:  1       * Notice:  This list has 2 medication(s) that are the same as other medications prescribed for you. Read the directions carefully, and ask your doctor or other care provider to review them with you.         Where to get your medicines      These medications were sent to [facility] [address] [place] [address] [address] [facility]  [address], [facility] [address]    Hours:  24-hours Phone:  [phone]     levothyroxine 100 MCG tablet    losartan 100 MG tablet                Primary Care Provider Office Phone # Fax #    Kalpana [facility] [phone] [address] [facility] [address]        Equal Access to Services     [facility] LUISITO AH: Hadii aad ku hadasho Soomaali, waaxda luqadaha, qaybta kaalmada adeegyada, waxay dagmar suárez. So [facility] [phone].    ATENCIÓN: Si habla español, tiene a roper disposición servicios gratuitos de asistencia lingüística. Llame al [phone].    We comply with applicable  federal civil rights laws and [place] laws. We do not discriminate on the basis of race, color, national origin, age, disability sex, sexual orientation or gender identity.            Thank you!     Thank you for choosing [facility]  for your care. Our goal is always to provide you with excellent care. Hearing back from our patients is one way we can continue to improve our services. Please take a few minutes to complete the written survey that you may receive in the mail after your visit with us. Thank you!             Your Updated Medication List - Protect others around you: Learn how to safely use, store and throw away your medicines at www.disposemymeds.org.          This list is accurate as of: 8/15/17  8:45 AM.  Always use your most recent med list.                   Brand Name Dispense Instructions for use Diagnosis    albuterol 108 (90 BASE) MCG/ACT Inhaler    PROAIR HFA/PROVENTIL HFA/VENTOLIN HFA    1 Inhaler    Inhale 1-2 puffs into the lungs every 4 hours as needed for shortness of breath / dyspnea    Mild intermittent asthma, uncomplicated       ALEVE PO      Reported on 4/28/2017        cyanocolbalamin 500 MCG tablet    vitamin  B-12     Take 2 tablets by mouth daily Chews        fluticasone 50 MCG/ACT spray    FLONASE    16 mL    USE ONE TO TWO SPRAYS IN EACH NOSTRIL EVERY EVENING    Chronic rhinitis       * levothyroxine 125 MCG tablet    SYNTHROID/LEVOTHROID    90 tablet    TAKE 1 TABLET BY MOUTH ONCE DAILY    Hypothyroidism, unspecified type       * levothyroxine 100 MCG tablet    SYNTHROID/LEVOTHROID    30 tablet    Take 1 tablet (100 mcg) by mouth daily    Hypothyroidism, unspecified type       losartan 100 MG tablet    COZAAR    30 tablet    Take 1 tablet (100 mg) by mouth daily    Benign essential hypertension       vitamin D 2000 UNITS Caps      Take 1 capsule by mouth daily        zolpidem 5 MG tablet    AMBIEN    30 tablet    TAKE 1 TABLET BY MOUTH NIGHTLY AS NEEDED     Insomnia, unspecified type       * Notice:  This list has 2 medication(s) that are the same as other medications prescribed for you. Read the directions carefully, and ask your doctor or other care provider to review them with you.

## 2017-08-15 NOTE — NURSING NOTE
"Chief Complaint   Patient presents with     [facility] Care     Thyroid Disease     F/U up hypothyroidism/ 6/8/17 lab results      Joint Pain     Shoulder, hands, feet- hx of fibromyalgia      Sleep Problem     F/U insomnia/ ambien 5 mg tablet        Initial /82  Pulse (!) 46  Temp 97.8  F (36.6  C) (Oral)  Wt 169 lb 3.2 oz (76.7 kg)  LMP  (LMP Unknown)  SpO2 98%  BMI 29.97 kg/m2 Estimated body mass index is 29.97 kg/(m^2) as calculated from the following:    Height as of 3/7/17: 5' 3\" (1.6 m).    Weight as of this encounter: 169 lb 3.2 oz (76.7 kg).  Medication Reconciliation: complete   Pamela Mccann CMA      "

## 2017-08-15 NOTE — NURSING NOTE
Prior to injection verified patient identity using patient's name and date of birth.    1.  Has the patient received the information for the Zostavax vaccine? YES    2.  Does the patient have any of the following contraindications?     Allergy to Neomycin or Gelatin?  No       Current moderate or severe illness? No  Temp = 97.8  If temp > 99.0 degrees orally or patient has above allergies, vaccine is deferred    3.  The vaccine has been administered in the usual fashion and the patient was instructed to wait 20 minutes before leaving the building in the event of an allergic reaction: YES    Vaccination given by Pamela Mccann CMA  .  Recorded by Pamela Mccann

## 2017-08-23 PROCEDURE — 82274 ASSAY TEST FOR BLOOD FECAL: CPT | Performed by: INTERNAL MEDICINE

## 2017-08-25 DIAGNOSIS — Z12.11 SCREEN FOR COLON CANCER: ICD-10-CM

## 2017-08-25 LAB — HEMOCCULT STL QL IA: NEGATIVE

## 2017-08-28 DIAGNOSIS — G47.00 INSOMNIA, UNSPECIFIED TYPE: ICD-10-CM

## 2017-08-29 RX ORDER — ZOLPIDEM TARTRATE 5 MG/1
TABLET ORAL
Qty: 30 TABLET | Refills: 0 | OUTPATIENT
Start: 2017-08-29

## 2017-08-29 NOTE — TELEPHONE ENCOUNTER
ambien      Last Written Prescription Date:  8/28/17  Last Fill Quantity: 30,   # refills: 0  Last Office Visit with [facility], [facility] or [facility] prescribing provider: 8/15/17  Future Office visit:       Routing refill request to provider for review/approval because:  Drug not on the [facility], [facility] or [facility] refill protocol or controlled substance

## 2017-09-01 DIAGNOSIS — G47.00 INSOMNIA, UNSPECIFIED TYPE: ICD-10-CM

## 2017-09-01 NOTE — TELEPHONE ENCOUNTER
zolpidem (AMBIEN) 5 MG tablet      Last Written Prescription Date:  08/02/2017  Last Fill Quantity: 30,   # refills: 0  Last Office Visit with [facility], [facility] or [facility] prescribing provider: 08/15/2017  Future Office visit:       Routing refill request to provider for review/approval because:  Drug not on the [facility], [facility] or [facility] refill protocol or controlled substance

## 2017-09-05 RX ORDER — ZOLPIDEM TARTRATE 5 MG/1
5 TABLET ORAL
Qty: 30 TABLET | Refills: 0 | Status: SHIPPED | OUTPATIENT
Start: 2017-09-05 | End: 2017-10-02

## 2017-09-05 NOTE — TELEPHONE ENCOUNTER
Pt called.  She needs a refill of ambien.  This was discussed at her appt 8/15/17 with Dr Rodgers. The pharmacy told the pt that they were told by the clinic that the pt needed to be seen before the medication could be refilled. Since the pt was seen 8/15, she was thinking she wouldn't need another appt.  She uses [facility] on [address]

## 2017-09-06 NOTE — TELEPHONE ENCOUNTER
Checked with the [facility] pharmacist and the pt picked up the Ambien from [facility] at [address] on 9/5/17 at 9:55 pm.

## 2017-09-27 ENCOUNTER — ALLIED HEALTH/NURSE VISIT (OUTPATIENT)
Dept: INTERNAL MEDICINE | Facility: CLINIC | Age: 62
End: 2017-09-27
Payer: COMMERCIAL

## 2017-09-27 VITALS — SYSTOLIC BLOOD PRESSURE: 138 MMHG | DIASTOLIC BLOOD PRESSURE: 68 MMHG

## 2017-09-27 DIAGNOSIS — Z01.30 BP CHECK: Primary | ICD-10-CM

## 2017-09-27 DIAGNOSIS — Z98.84 S/P GASTRIC BYPASS: ICD-10-CM

## 2017-09-27 LAB
DEPRECATED CALCIDIOL+CALCIFEROL SERPL-MC: 35 UG/L (ref 20–75)
ERYTHROCYTE [DISTWIDTH] IN BLOOD BY AUTOMATED COUNT: 15.1 % (ref 10–15)
FERRITIN SERPL-MCNC: 8 NG/ML (ref 8–252)
HCT VFR BLD AUTO: 35 % (ref 35–47)
HGB BLD-MCNC: 10.6 G/DL (ref 11.7–15.7)
IRON SATN MFR SERPL: 8 % (ref 15–46)
IRON SERPL-MCNC: 39 UG/DL (ref 35–180)
MCH RBC QN AUTO: 25.5 PG (ref 26.5–33)
MCHC RBC AUTO-ENTMCNC: 30.3 G/DL (ref 31.5–36.5)
MCV RBC AUTO: 84 FL (ref 78–100)
PLATELET # BLD AUTO: 203 10E9/L (ref 150–450)
RBC # BLD AUTO: 4.15 10E12/L (ref 3.8–5.2)
TIBC SERPL-MCNC: 485 UG/DL (ref 240–430)
VIT B12 SERPL-MCNC: 1485 PG/ML (ref 193–986)
WBC # BLD AUTO: 5.7 10E9/L (ref 4–11)

## 2017-09-27 PROCEDURE — 83540 ASSAY OF IRON: CPT | Performed by: INTERNAL MEDICINE

## 2017-09-27 PROCEDURE — 83550 IRON BINDING TEST: CPT | Performed by: INTERNAL MEDICINE

## 2017-09-27 PROCEDURE — 99207 ZZC NO CHARGE NURSE ONLY: CPT | Performed by: INTERNAL MEDICINE

## 2017-09-27 PROCEDURE — 82607 VITAMIN B-12: CPT | Performed by: INTERNAL MEDICINE

## 2017-09-27 PROCEDURE — 82306 VITAMIN D 25 HYDROXY: CPT | Performed by: INTERNAL MEDICINE

## 2017-09-27 PROCEDURE — 82728 ASSAY OF FERRITIN: CPT | Performed by: INTERNAL MEDICINE

## 2017-09-27 PROCEDURE — 36415 COLL VENOUS BLD VENIPUNCTURE: CPT | Performed by: INTERNAL MEDICINE

## 2017-09-27 PROCEDURE — 85027 COMPLETE CBC AUTOMATED: CPT | Performed by: INTERNAL MEDICINE

## 2017-09-27 NOTE — MR AVS SNAPSHOT
After Visit Summary   9/27/2017    Ilana Shin    MRN: [id number]           Patient Information     Date Of Birth          1955        Visit Information        Provider Department      9/27/2017 9:12 AM Prema Rodgers MD [facility]        Today's Diagnoses     BP check    -  1       Follow-ups after your visit        Who to contact     If you have questions or need follow up information about today's clinic visit or your schedule please contact [facility] directly at [phone].  Normal or non-critical lab and imaging results will be communicated to you by Collactivehart, letter or phone within 4 business days after the clinic has received the results. If you do not hear from us within 7 days, please contact the clinic through DisabledParkt or phone. If you have a critical or abnormal lab result, we will notify you by phone as soon as possible.  Submit refill requests through Epizyme or call your pharmacy and they will forward the refill request to us. Please allow 3 business days for your refill to be completed.          Additional Information About Your Visit        MyChart Information     Epizyme gives you secure access to your electronic health record. If you see a primary care provider, you can also send messages to your care team and make appointments. If you have questions, please call your primary care clinic.  If you do not have a primary care provider, please call [phone] and they will assist you.        Care EveryWhere ID     This is your Care EveryWhere ID. This could be used by other organizations to access your [place] medical records  [id number]        Your Vitals Were     Last Period                   (LMP Unknown)            Blood Pressure from Last 3 Encounters:   09/27/17 138/68   08/15/17 165/78   04/28/17 150/70    Weight from Last 3 Encounters:   08/15/17 169 lb 3.2 oz (76.7 kg)   04/28/17 175 lb (79.4 kg)   03/07/17 175 lb  (79.4 kg)              Today, you had the following     No orders found for display       Primary Care Provider Office Phone # Fax #    Kalpana [facility] [phone] [address] [facility] [address]        Equal Access to Services     LINDSEY JORGE : Hadii aad ku hadtrinityo Soomaali, waaxda luqadaha, qaybta kaalmada adeegyada, waxay idiin hayaan ademurphy ross lasydnie suárez. So [facility] [phone].    ATENCIÓN: Si habla español, tiene a roper disposición servicios gratuitos de asistencia lingüística. Llame al [phone].    We comply with applicable federal civil rights laws and [place] laws. We do not discriminate on the basis of race, color, national origin, age, disability sex, sexual orientation or gender identity.            Thank you!     Thank you for choosing [facility]  for your care. Our goal is always to provide you with excellent care. Hearing back from our patients is one way we can continue to improve our services. Please take a few minutes to complete the written survey that you may receive in the mail after your visit with us. Thank you!             Your Updated Medication List - Protect others around you: Learn how to safely use, store and throw away your medicines at www.disposemymeds.org.          This list is accurate as of: 9/27/17  9:15 AM.  Always use your most recent med list.                   Brand Name Dispense Instructions for use Diagnosis    albuterol 108 (90 BASE) MCG/ACT Inhaler    PROAIR HFA/PROVENTIL HFA/VENTOLIN HFA    1 Inhaler    Inhale 1-2 puffs into the lungs every 4 hours as needed for shortness of breath / dyspnea    Mild intermittent asthma, uncomplicated       ALEVE PO      Reported on 4/28/2017        cyanocolbalamin 500 MCG tablet    vitamin  B-12     Take 2 tablets by mouth daily Chews        fluticasone 50 MCG/ACT spray    FLONASE    16 mL    USE ONE TO TWO SPRAYS IN EACH NOSTRIL EVERY EVENING    Chronic rhinitis        * levothyroxine 125 MCG tablet    SYNTHROID/LEVOTHROID    90 tablet    TAKE 1 TABLET BY MOUTH ONCE DAILY    Hypothyroidism, unspecified type       * levothyroxine 100 MCG tablet    SYNTHROID/LEVOTHROID    30 tablet    Take 1 tablet (100 mcg) by mouth daily    Hypothyroidism, unspecified type       losartan 100 MG tablet    COZAAR    30 tablet    Take 1 tablet (100 mg) by mouth daily    Benign essential hypertension       vitamin D 2000 UNITS Caps      Take 1 capsule by mouth daily        zolpidem 5 MG tablet    AMBIEN    30 tablet    Take 1 tablet (5 mg) by mouth nightly as needed for sleep    Insomnia, unspecified type       * Notice:  This list has 2 medication(s) that are the same as other medications prescribed for you. Read the directions carefully, and ask your doctor or other care provider to review them with you.

## 2017-10-02 DIAGNOSIS — G47.00 INSOMNIA, UNSPECIFIED TYPE: ICD-10-CM

## 2017-10-03 RX ORDER — ZOLPIDEM TARTRATE 5 MG/1
TABLET ORAL
Qty: 30 TABLET | Refills: 0 | Status: SHIPPED | OUTPATIENT
Start: 2017-10-03 | End: 2017-10-31

## 2017-10-03 NOTE — TELEPHONE ENCOUNTER
ambien  Last Written Prescription Date:  9/5/17  Last Fill Quantity: 30,   # refills: 0  Last Office Visit with [facility] prescribing provider: 8/15/17  Future Office visit:       Routing refill request to provider for review/approval because:  Drug not on the [facility] refill protocol or controlled substance

## 2017-10-04 ENCOUNTER — MYC MEDICAL ADVICE (OUTPATIENT)
Dept: INTERNAL MEDICINE | Facility: CLINIC | Age: 62
End: 2017-10-04

## 2017-10-04 DIAGNOSIS — J30.0 CHRONIC VASOMOTOR RHINITIS: Primary | ICD-10-CM

## 2017-10-04 DIAGNOSIS — E03.9 HYPOTHYROIDISM, UNSPECIFIED TYPE: ICD-10-CM

## 2017-10-04 DIAGNOSIS — I10 BENIGN ESSENTIAL HYPERTENSION: ICD-10-CM

## 2017-10-05 RX ORDER — LEVOTHYROXINE SODIUM 100 UG/1
100 TABLET ORAL DAILY
Qty: 30 TABLET | Refills: 1 | OUTPATIENT
Start: 2017-10-05

## 2017-10-05 RX ORDER — FLUTICASONE PROPIONATE 50 MCG
SPRAY, SUSPENSION (ML) NASAL
Qty: 16 ML | Refills: 9 | Status: SHIPPED | OUTPATIENT
Start: 2017-10-05 | End: 2018-03-24

## 2017-10-05 RX ORDER — LOSARTAN POTASSIUM 100 MG/1
100 TABLET ORAL DAILY
Qty: 30 TABLET | Refills: 0 | Status: SHIPPED | OUTPATIENT
Start: 2017-10-05 | End: 2017-10-12

## 2017-10-05 NOTE — TELEPHONE ENCOUNTER
Routing refill request to provider for review/approval because:  Labs out of range:TSH  Unsure regarding follow up plan. Some labs checked recently but not TSH. Do not see order for that from Dr. Rodgers.   Flonase last prescribed by different MD.  Losartan- recently started, any labs/follow up needed now?    levothyroxine (SYNTHROID/LEVOTHROID) 100 MCG tablet     Last Written Prescription Date: 8/15/17  Last Quantity: 30, # refills: 1  Last Office Visit with [facility], [facility] or [facility] prescribing provider: 8/15/17        TSH   Date Value Ref Range Status   06/08/2017 0.06 (L) 0.40 - 4.00 mU/L Final       losartan (COZAAR) 100 MG tablet      Last Written Prescription Date: 8/15/17  Last Fill Quantity: 30, # refills: 1  Last Office Visit with [facility], [facility] or [facility] prescribing provider: 8/15/17       Potassium   Date Value Ref Range Status   06/08/2017 3.6 3.4 - 5.3 mmol/L Final     Creatinine   Date Value Ref Range Status   06/08/2017 0.94 0.52 - 1.04 mg/dL Final     BP Readings from Last 3 Encounters:   09/27/17 138/68   08/15/17 165/78   04/28/17 150/70         fluticasone (FLONASE) 50 MCG/ACT spray      Last Written Prescription Date: 5/31/17  Last Fill Quantity: 16 mL,  # refills: 9   Last Office Visit with [facility], [facility] or [facility] prescribing provider: 8/15/17

## 2017-10-06 NOTE — TELEPHONE ENCOUNTER
Cannot refill levothyroxine until repeat TFTs performed. (these have been ordered - lab visit only).    Can only give temporary refill of Losartan as BMP is due (this has been ordered - lab visit only).

## 2017-10-09 ENCOUNTER — HOSPITAL ENCOUNTER (OUTPATIENT)
Dept: BONE DENSITY | Facility: CLINIC | Age: 62
End: 2017-10-09
Attending: INTERNAL MEDICINE
Payer: COMMERCIAL

## 2017-10-09 ENCOUNTER — HOSPITAL ENCOUNTER (OUTPATIENT)
Dept: MAMMOGRAPHY | Facility: CLINIC | Age: 62
Discharge: HOME | End: 2017-10-09
Attending: INTERNAL MEDICINE | Admitting: INTERNAL MEDICINE
Payer: COMMERCIAL

## 2017-10-09 DIAGNOSIS — Z78.0 ASYMPTOMATIC MENOPAUSE: ICD-10-CM

## 2017-10-09 DIAGNOSIS — Z12.39 SCREENING FOR BREAST CANCER: ICD-10-CM

## 2017-10-09 PROCEDURE — G0202 SCR MAMMO BI INCL CAD: HCPCS

## 2017-10-09 PROCEDURE — 77080 DXA BONE DENSITY AXIAL: CPT

## 2017-10-12 DIAGNOSIS — I10 BENIGN ESSENTIAL HYPERTENSION: ICD-10-CM

## 2017-10-12 DIAGNOSIS — E03.9 HYPOTHYROIDISM, UNSPECIFIED TYPE: ICD-10-CM

## 2017-10-12 LAB
ANION GAP SERPL CALCULATED.3IONS-SCNC: 5 MMOL/L (ref 3–14)
BUN SERPL-MCNC: 16 MG/DL (ref 7–30)
CALCIUM SERPL-MCNC: 9.4 MG/DL (ref 8.5–10.1)
CHLORIDE SERPL-SCNC: 107 MMOL/L (ref 94–109)
CO2 SERPL-SCNC: 28 MMOL/L (ref 20–32)
CREAT SERPL-MCNC: 1.11 MG/DL (ref 0.52–1.04)
GFR SERPL CREATININE-BSD FRML MDRD: 50 ML/MIN/1.7M2
GLUCOSE SERPL-MCNC: 153 MG/DL (ref 70–99)
POTASSIUM SERPL-SCNC: 4.5 MMOL/L (ref 3.4–5.3)
SODIUM SERPL-SCNC: 140 MMOL/L (ref 133–144)
T4 FREE SERPL-MCNC: 1.04 NG/DL (ref 0.76–1.46)
TSH SERPL DL<=0.005 MIU/L-ACNC: 2.24 MU/L (ref 0.4–4)

## 2017-10-12 PROCEDURE — 84439 ASSAY OF FREE THYROXINE: CPT | Performed by: INTERNAL MEDICINE

## 2017-10-12 PROCEDURE — 84443 ASSAY THYROID STIM HORMONE: CPT | Performed by: INTERNAL MEDICINE

## 2017-10-12 PROCEDURE — 80048 BASIC METABOLIC PNL TOTAL CA: CPT | Performed by: INTERNAL MEDICINE

## 2017-10-12 PROCEDURE — 36415 COLL VENOUS BLD VENIPUNCTURE: CPT | Performed by: INTERNAL MEDICINE

## 2017-10-12 RX ORDER — LOSARTAN POTASSIUM 100 MG/1
100 TABLET ORAL DAILY
Qty: 90 TABLET | Refills: 3 | Status: SHIPPED | OUTPATIENT
Start: 2017-10-12 | End: 2017-11-08

## 2017-10-12 RX ORDER — LEVOTHYROXINE SODIUM 100 UG/1
100 TABLET ORAL DAILY
Qty: 90 TABLET | Refills: 3 | Status: SHIPPED | OUTPATIENT
Start: 2017-10-12 | End: 2018-07-20

## 2017-10-31 DIAGNOSIS — G47.00 INSOMNIA, UNSPECIFIED TYPE: ICD-10-CM

## 2017-10-31 RX ORDER — ZOLPIDEM TARTRATE 5 MG/1
5 TABLET ORAL
Qty: 30 TABLET | Refills: 0 | Status: SHIPPED | OUTPATIENT
Start: 2017-10-31 | End: 2017-11-24

## 2017-10-31 NOTE — TELEPHONE ENCOUNTER
Zolpidem 5mg      Last Written Prescription Date:  10/3/17  Last Fill Quantity: 30,   # refills: 0  Future Office visit:       Routing refill request to provider for review/approval because:  Drug not on the [facility], [facility] or [facility] refill protocol or controlled substance    Thank you!    Maryjane Bahena [facility]  [email or web address]  [phone]

## 2017-11-08 ENCOUNTER — MYC REFILL (OUTPATIENT)
Dept: INTERNAL MEDICINE | Facility: CLINIC | Age: 62
End: 2017-11-08

## 2017-11-08 DIAGNOSIS — I10 BENIGN ESSENTIAL HYPERTENSION: ICD-10-CM

## 2017-11-09 RX ORDER — LOSARTAN POTASSIUM 100 MG/1
100 TABLET ORAL DAILY
Qty: 90 TABLET | Refills: 3 | Status: SHIPPED | OUTPATIENT
Start: 2017-11-09 | End: 2017-11-27

## 2017-11-09 NOTE — TELEPHONE ENCOUNTER
Message from BodyGuardzhart:  Original authorizing provider: MD Ilana Hendricks would like a refill of the following medications:  losartan (COZAAR) 100 MG tablet [Prema Rodgers MD]    Preferred pharmacy: [address]    Comment:

## 2017-11-24 ENCOUNTER — MYC REFILL (OUTPATIENT)
Dept: INTERNAL MEDICINE | Facility: CLINIC | Age: 62
End: 2017-11-24

## 2017-11-24 DIAGNOSIS — G47.00 INSOMNIA, UNSPECIFIED TYPE: ICD-10-CM

## 2017-11-24 RX ORDER — ZOLPIDEM TARTRATE 5 MG/1
5 TABLET ORAL
Qty: 30 TABLET | Refills: 0 | Status: SHIPPED | OUTPATIENT
Start: 2017-12-01 | End: 2017-12-29

## 2017-11-24 NOTE — TELEPHONE ENCOUNTER
ambien      Last Office Visit: 8/15/17  Last Written Prescription Date:  10/31/17  Last Fill Quantity: 30,   # refills: 0  Future Office visit:       Routing refill request to provider for review/approval because:  Drug not on the [facility], [facility] or [facility] refill protocol or controlled substance

## 2017-11-24 NOTE — TELEPHONE ENCOUNTER
Message from Corebookhart:  Original authorizing provider: MD Ilana Hendricks would like a refill of the following medications:  zolpidem (AMBIEN) 5 MG tablet [Prema Rodgers MD]    Preferred pharmacy: [address]    Comment:

## 2017-11-27 ENCOUNTER — OFFICE VISIT (OUTPATIENT)
Dept: INTERNAL MEDICINE | Facility: CLINIC | Age: 62
End: 2017-11-27
Payer: COMMERCIAL

## 2017-11-27 VITALS
WEIGHT: 168.1 LBS | HEIGHT: 63 IN | DIASTOLIC BLOOD PRESSURE: 60 MMHG | SYSTOLIC BLOOD PRESSURE: 100 MMHG | TEMPERATURE: 98.6 F | HEART RATE: 50 BPM | OXYGEN SATURATION: 99 % | BODY MASS INDEX: 29.79 KG/M2

## 2017-11-27 DIAGNOSIS — I10 BENIGN ESSENTIAL HYPERTENSION: ICD-10-CM

## 2017-11-27 DIAGNOSIS — L98.9 SKIN LESION: Primary | ICD-10-CM

## 2017-11-27 DIAGNOSIS — Z98.84 S/P GASTRIC BYPASS: ICD-10-CM

## 2017-11-27 PROCEDURE — 99214 OFFICE O/P EST MOD 30 MIN: CPT | Performed by: INTERNAL MEDICINE

## 2017-11-27 RX ORDER — LOSARTAN POTASSIUM 100 MG/1
50 TABLET ORAL DAILY
COMMUNITY
Start: 2017-11-27 | End: 2018-02-27

## 2017-11-27 NOTE — PATIENT INSTRUCTIONS
Sent a prescription for ferrous sulfate ER to the pharmacy. Continue life-long (along with vitamin B12 and vitamin D).    ---    Try HALF dose of Losartan.     Come in for a pharmacy blood pressure check in ~2-4 weeks.    Goal is <130/80.    ---    Lesion on the back looks like a benign seborrheic keratosis.    Dermatology referral placed - may schedule if you'd like (though I'm not too worried, they can provide a second opinion and resect if needed).

## 2017-11-27 NOTE — PROGRESS NOTES
"  SUBJECTIVE:                                                      HPI: Ilana Shin is a pleasant 62 year old female who presents with a concerning skin lesion:    - right, lateral, upper back, under her bra  - has been present for years, but is growing in size  - generally asymptomatic, other than bra rubbing on it    No personal or FH history of skin cancer.    Patient is also interested in decreasing blood pressure medication. Has been working out and losing weight.    Currently on losartan 100 mg daily.    Blood pressure is well within normal limits today.    Patient also wants to know if she needs to take daily iron supplement.    PSH significant for [facility]-en-Y gastric bypass surgery.    Patient educated that she will require iron, B12, and vitamin D supplementation indefinitely due to malabsorption from above surgery.    The medication, allergy, and problem lists have been reviewed and updated as appropriate.       OBJECTIVE:                                                      /60 (BP Location: Left arm, Patient Position: Chair, Cuff Size: Adult Regular)  Pulse 50  Temp 98.6  F (37  C) (Oral)  Ht 5' 3\" (1.6 m)  Wt 168 lb 1.6 oz (76.2 kg)  LMP  (LMP Unknown)  SpO2 99%  BMI 29.78 kg/m2  Constitutional: well-appearing  Integumentary: well-circumscribed, splotchy-brown, waxy lesion, ~1cm in diameter right, lateral, upper back    ASSESSMENT/PLAN:                                                      (L98.9) Skin lesion  (primary encounter diagnosis)  Comment: suspect benign seborrheic keratosis.  Plan: referral to dermatology offered for second opinion and/or resection - patient to schedule.    (Z98.84) S/P gastric bypass  Comment: patient will require lifelong supplementation with iron, B12, and vitamin D due to malabsorption.  Plan: refill of iron supplement provided.    (I10) Benign essential hypertension  Comment: very well controlled on losartan 100 mg daily.  Plan:    - TRIAL of decreasing " losartan to 50 mg daily.   - recheck blood pressure in the pharmacy in 2-4 weeks.   - goal blood pressure is <130/80.     The instructions on the AVS were discussed and explained to the patient. Patient expressed understanding of instructions.    (Chart documentation was completed, in part, with TabbedOut voice-recognition software. Even though reviewed, some grammatical, spelling, and word errors may remain.)    Prema Rodgers MD   [address] [address]  T: [phone], F: [phone]

## 2017-11-27 NOTE — MR AVS SNAPSHOT
After Visit Summary   11/27/2017    Ilana Shin    MRN: [id number]           Patient Information     Date Of Birth          1955        Visit Information        Provider Department      11/27/2017 11:30 AM Prema Rodgers MD [facility]        Today's Diagnoses     S/P gastric bypass    -  1    Benign essential hypertension        Skin lesion          Care Instructions    Sent a prescription for ferrous sulfate ER to the pharmacy. Continue life-long (along with vitamin B12 and vitamin D).    ---    Try HALF dose of Losartan.     Come in for a pharmacy blood pressure check in ~2-4 weeks.    Goal is <130/80.    ---    Lesion on the back looks like a benign seborrheic keratosis.    Dermatology referral placed - may schedule if you'd like (though I'm not too worried, they can provide a second opinion and resect if needed).           Follow-ups after your visit        Additional Services     DERMATOLOGY REFERRAL       Your provider has referred you to: [facility]: [facility] [phone]   [email or web address]    Please be aware that coverage of these services is subject to the terms and limitations of your health insurance plan.  Call member services at your health plan with any benefit or coverage questions.      Please bring the following with you to your appointment:    (1) Any X-Rays, CTs or MRIs which have been performed.  Contact the facility where they were done to arrange for  prior to your scheduled appointment.    (2) List of current medications  (3) This referral request   (4) Any documents/labs given to you for this referral                  Who to contact     If you have questions or need follow up information about today's clinic visit or your schedule please contact [facility] directly at [phone].  Normal or non-critical lab and imaging results will be communicated to  "you by MyChart, letter or phone within 4 business days after the clinic has received the results. If you do not hear from us within 7 days, please contact the clinic through PowerWise Holdings or phone. If you have a critical or abnormal lab result, we will notify you by phone as soon as possible.  Submit refill requests through Power[employer]ise [employer] or call your pharmacy and they will forward the refill request to us. Please allow 3 business days for your refill to be completed.          Additional Information About Your Visit        S[employer] Capture Technologies[employer]Wonderflow Information     [employer] gives you secure access to your electronic health record. If you see a primary care provider, you can also send messages to your care team and make appointments. If you have questions, please call your primary care clinic.  If you do not have a primary care provider, please call [phone] and they will assist you.        Care EveryWhere ID     This is your Care EveryWhere ID. This could be used by other organizations to access your [place] medical records  [id number]        Your Vitals Were     Pulse Temperature Height Last Period Pulse Oximetry BMI (Body Mass Index)    50 98.6  F (37  C) (Oral) 5' 3\" (1.6 m) (LMP Unknown) 99% 29.78 kg/m2       Blood Pressure from Last 3 Encounters:   11/27/17 100/60   09/27/17 138/68   08/15/17 165/78    Weight from Last 3 Encounters:   11/27/17 168 lb 1.6 oz (76.2 kg)   08/15/17 169 lb 3.2 oz (76.7 kg)   04/28/17 175 lb (79.4 kg)              We Performed the Following     DERMATOLOGY REFERRAL          Today's Medication Changes          These changes are accurate as of: 11/27/17 11:50 AM.  If you have any questions, ask your nurse or doctor.               Start taking these medicines.        Dose/Directions    ferrous sulfate 142 (45 FE) MG Tbcr   Commonly known as:  SLO-FE   Used for:  S/P gastric bypass   Started by:  Prema Rodgers MD        Dose:  142 mg   Take 1 tablet (142 mg) by mouth daily   Quantity:  90 tablet   Refills:  " 3         These medicines have changed or have updated prescriptions.        Dose/Directions    losartan 100 MG tablet   Commonly known as:  COZAAR   This may have changed:  how much to take   Used for:  Benign essential hypertension   Changed by:  Prema Rodgers MD        Dose:  50 mg   Take 0.5 tablets (50 mg) by mouth daily   Refills:  0            Where to get your medicines      These medications were sent to [address] [address]     Phone:  [phone]     ferrous sulfate 142 (45 FE) MG [facility]                Primary Care Provider Office Phone # Fax #    [facility] [phone] [address] [facility] [address]        Equal Access to Services     LINDSEY JORGE : Hadii aad ku hadasho Soomaali, waaxda luqadaha, qaybta kaalmada adeegyada, topher suárez. So [facility] [phone].    ATENCIÓN: Si habla español, tiene a roper disposición servicios gratuitos de asistencia lingüística. [facility] [phone].    We comply with applicable federal civil rights laws and [place] laws. We do not discriminate on the basis of race, color, national origin, age, disability, sex, sexual orientation, or gender identity.            Thank you!     Thank you for choosing [facility]  for your care. Our goal is always to provide you with excellent care. Hearing back from our patients is one way we can continue to improve our services. Please take a few minutes to complete the written survey that you may receive in the mail after your visit with us. Thank you!             Your Updated Medication List - Protect others around you: Learn how to safely use, store and throw away your medicines at www.disposemymeds.org.          This list is accurate as of: 11/27/17 11:50 AM.  Always use your most recent med list.                   Brand Name Dispense Instructions  for use Diagnosis    albuterol 108 (90 BASE) MCG/ACT Inhaler    PROAIR HFA/PROVENTIL HFA/VENTOLIN HFA    1 Inhaler    Inhale 1-2 puffs into the lungs every 4 hours as needed for shortness of breath / dyspnea    Mild intermittent asthma, uncomplicated       ALEVE PO      Reported on 4/28/2017        cyanocolbalamin 500 MCG tablet    vitamin  B-12     Take 2 tablets by mouth daily Chews        ferrous sulfate 142 (45 FE) MG Tbcr    SLO-FE    90 tablet    Take 1 tablet (142 mg) by mouth daily    S/P gastric bypass       fluticasone 50 MCG/ACT spray    FLONASE    16 mL    USE ONE TO TWO SPRAYS IN EACH NOSTRIL EVERY EVENING    Chronic vasomotor rhinitis       levothyroxine 100 MCG tablet    SYNTHROID/LEVOTHROID    90 tablet    Take 1 tablet (100 mcg) by mouth daily    Hypothyroidism, unspecified type       losartan 100 MG tablet    COZAAR     Take 0.5 tablets (50 mg) by mouth daily    Benign essential hypertension       vitamin D 2000 UNITS Caps      Take 1 capsule by mouth daily        zolpidem 5 MG tablet   Start taking on:  12/1/2017    AMBIEN    30 tablet    Take 1 tablet (5 mg) by mouth nightly as needed for sleep    Insomnia, unspecified type

## 2017-12-02 ENCOUNTER — MYC REFILL (OUTPATIENT)
Dept: INTERNAL MEDICINE | Facility: CLINIC | Age: 62
End: 2017-12-02

## 2017-12-02 DIAGNOSIS — G47.00 INSOMNIA, UNSPECIFIED TYPE: ICD-10-CM

## 2017-12-02 RX ORDER — ZOLPIDEM TARTRATE 5 MG/1
5 TABLET ORAL
Qty: 30 TABLET | Refills: 0 | Status: CANCELLED | OUTPATIENT
Start: 2017-12-02

## 2017-12-04 NOTE — TELEPHONE ENCOUNTER
Message from MugenUphart:  Original authorizing provider: MD Ilana Hendricks would like a refill of the following medications:  zolpidem (AMBIEN) 5 MG tablet [Prema Rodgers MD]    Preferred pharmacy: [address]    Comment:

## 2017-12-29 ENCOUNTER — MYC REFILL (OUTPATIENT)
Dept: INTERNAL MEDICINE | Facility: CLINIC | Age: 62
End: 2017-12-29

## 2017-12-29 DIAGNOSIS — J30.0 CHRONIC VASOMOTOR RHINITIS: ICD-10-CM

## 2017-12-29 DIAGNOSIS — G47.00 INSOMNIA, UNSPECIFIED TYPE: ICD-10-CM

## 2017-12-29 RX ORDER — FLUTICASONE PROPIONATE 50 MCG
SPRAY, SUSPENSION (ML) NASAL
Qty: 16 ML | Refills: 9 | Status: CANCELLED | OUTPATIENT
Start: 2017-12-29

## 2017-12-29 NOTE — TELEPHONE ENCOUNTER
Message from MyChart:  Original authorizing provider: MD Ilana Hendricks would like a refill of the following medications:  fluticasone (FLONASE) 50 MCG/ACT spray [Prema Rodgers MD]  zolpidem (AMBIEN) 5 MG tablet [Prema Rodgers MD]    Preferred pharmacy: [address]    Comment:

## 2018-01-02 RX ORDER — ZOLPIDEM TARTRATE 5 MG/1
5 TABLET ORAL
Qty: 30 TABLET | Refills: 0 | Status: SHIPPED | OUTPATIENT
Start: 2018-01-02 | End: 2018-01-31

## 2018-01-03 ENCOUNTER — ALLIED HEALTH/NURSE VISIT (OUTPATIENT)
Dept: INTERNAL MEDICINE | Facility: CLINIC | Age: 63
End: 2018-01-03
Payer: COMMERCIAL

## 2018-01-03 VITALS — DIASTOLIC BLOOD PRESSURE: 68 MMHG | SYSTOLIC BLOOD PRESSURE: 148 MMHG

## 2018-01-03 DIAGNOSIS — Z01.30 BP CHECK: Primary | ICD-10-CM

## 2018-01-03 PROCEDURE — 99207 ZZC NO CHARGE NURSE ONLY: CPT | Performed by: INTERNAL MEDICINE

## 2018-01-03 NOTE — MR AVS SNAPSHOT
After Visit Summary   1/3/2018    Ilana Shin    MRN: [id number]           Patient Information     Date Of Birth          1955        Visit Information        Provider Department      1/3/2018 7:35 PM Prema Rodgers MD [facility]        Today's Diagnoses     BP check    -  1       Follow-ups after your visit        Who to contact     If you have questions or need follow up information about today's clinic visit or your schedule please contact [facility] directly at [phone].  Normal or non-critical lab and imaging results will be communicated to you by ShootHomehart, letter or phone within 4 business days after the clinic has received the results. If you do not hear from us within 7 days, please contact the clinic through Seeot or phone. If you have a critical or abnormal lab result, we will notify you by phone as soon as possible.  Submit refill requests through Normal or call your pharmacy and they will forward the refill request to us. Please allow 3 business days for your refill to be completed.          Additional Information About Your Visit        MyChart Information     Normal gives you secure access to your electronic health record. If you see a primary care provider, you can also send messages to your care team and make appointments. If you have questions, please call your primary care clinic.  If you do not have a primary care provider, please call [phone] and they will assist you.        Care EveryWhere ID     This is your Care EveryWhere ID. This could be used by other organizations to access your [place] medical records  [id number]        Your Vitals Were     Last Period                   (LMP Unknown)            Blood Pressure from Last 3 Encounters:   01/03/18 148/68   11/27/17 100/60   09/27/17 138/68    Weight from Last 3 Encounters:   11/27/17 168 lb 1.6 oz (76.2 kg)   08/15/17 169 lb 3.2 oz (76.7 kg)   04/28/17  175 lb (79.4 kg)              Today, you had the following     No orders found for display       Primary Care Provider Office Phone # Fax #    Kalpana [facility] [phone] [address] [facility] [address]        Equal Access to Services     LINDSEY JORGE : Hadii aad ku hadtrinityo Soomaali, waaxda luqadaha, qaybta kaalmada adeegyada, waxay idiin hayaan adeeg khtimothy lasydnie suárez. So [facility] [phone].    ATENCIÓN: Si habla español, tiene a roper disposición servicios gratuitos de asistencia lingüística. Llame al [phone].    We comply with applicable federal civil rights laws and [place] laws. We do not discriminate on the basis of race, color, national origin, age, disability, sex, sexual orientation, or gender identity.            Thank you!     Thank you for choosing [facility]  for your care. Our goal is always to provide you with excellent care. Hearing back from our patients is one way we can continue to improve our services. Please take a few minutes to complete the written survey that you may receive in the mail after your visit with us. Thank you!             Your Updated Medication List - Protect others around you: Learn how to safely use, store and throw away your medicines at www.disposemymeds.org.          This list is accurate as of: 1/3/18  7:45 PM.  Always use your most recent med list.                   Brand Name Dispense Instructions for use Diagnosis    albuterol 108 (90 BASE) MCG/ACT Inhaler    PROAIR HFA/PROVENTIL HFA/VENTOLIN HFA    1 Inhaler    Inhale 1-2 puffs into the lungs every 4 hours as needed for shortness of breath / dyspnea    Mild intermittent asthma, uncomplicated       ALEVE PO      Reported on 4/28/2017        cyanocolbalamin 500 MCG tablet    vitamin  B-12     Take 2 tablets by mouth daily Chews        ferrous sulfate 142 (45 FE) MG Tbcr    SLO-FE    90 tablet    Take 1 tablet (142 mg) by mouth daily    S/P gastric  bypass       fluticasone 50 MCG/ACT spray    FLONASE    16 mL    USE ONE TO TWO SPRAYS IN EACH NOSTRIL EVERY EVENING    Chronic vasomotor rhinitis       levothyroxine 100 MCG tablet    SYNTHROID/LEVOTHROID    90 tablet    Take 1 tablet (100 mcg) by mouth daily    Hypothyroidism, unspecified type       losartan 100 MG tablet    COZAAR     Take 0.5 tablets (50 mg) by mouth daily    Benign essential hypertension       vitamin D 2000 UNITS Caps      Take 1 capsule by mouth daily        zolpidem 5 MG tablet    AMBIEN    30 tablet    Take 1 tablet (5 mg) by mouth nightly as needed for sleep    Insomnia, unspecified type

## 2018-01-04 NOTE — NURSING NOTE
Ilana Shin is enrolled/participating in the retail pharmacy Blood Pressure Goals Achievement Program (BPGAP).  Ilana Shin was evaluated at [facility] on January 3, 2018 at which time her blood pressure was:    BP Readings from Last 3 Encounters:   01/03/18 148/68   11/27/17 100/60   09/27/17 138/68     Reviewed lifestyle modifications for blood pressure control and reduction: including making healthy food choices, managing weight, getting regular exercise, smoking cessation, reducing alcohol consumption, monitoring blood pressure regularly.     Ilana Shin is not experiencing symptoms.    Follow-Up: BP is at goal of < 150/90 mmHg (patient 60+ years of age without diabetes), Recommended follow-up in 6 month at the pharmacy.   Recommendation to Provider:    Ilana Shin was evaluated for enrollment into the PGEN study today.    Patient eligible for enrollment:  Unknown  Patient interested in enrollment:  Unknown    Completed by:  [facility]   [phone]

## 2018-01-12 ENCOUNTER — MYC REFILL (OUTPATIENT)
Dept: INTERNAL MEDICINE | Facility: CLINIC | Age: 63
End: 2018-01-12

## 2018-01-12 DIAGNOSIS — E03.9 HYPOTHYROIDISM, UNSPECIFIED TYPE: ICD-10-CM

## 2018-01-12 RX ORDER — LEVOTHYROXINE SODIUM 100 UG/1
100 TABLET ORAL DAILY
Qty: 90 TABLET | Refills: 3 | Status: CANCELLED | OUTPATIENT
Start: 2018-01-12

## 2018-01-12 NOTE — TELEPHONE ENCOUNTER
Message from MyChart:  Original authorizing provider: MD Ilana Hendricks would like a refill of the following medications:  levothyroxine (SYNTHROID/LEVOTHROID) 100 MCG tablet [Prema Rodgers MD]    Preferred pharmacy: [address]    Comment:

## 2018-01-31 ENCOUNTER — MYC REFILL (OUTPATIENT)
Dept: INTERNAL MEDICINE | Facility: CLINIC | Age: 63
End: 2018-01-31

## 2018-01-31 DIAGNOSIS — G47.00 INSOMNIA, UNSPECIFIED TYPE: ICD-10-CM

## 2018-01-31 RX ORDER — ZOLPIDEM TARTRATE 5 MG/1
5 TABLET ORAL
Qty: 30 TABLET | Refills: 0 | Status: SHIPPED | OUTPATIENT
Start: 2018-01-31 | End: 2018-03-05

## 2018-01-31 NOTE — TELEPHONE ENCOUNTER
Message from [employer]:  Original authorizing provider: MD Ilana Hendricks would like a refill of the following medications:  zolpidem (AMBIEN) 5 MG tablet [Prema Rodgers MD]    Preferred pharmacy: [address]    Comment:

## 2018-01-31 NOTE — TELEPHONE ENCOUNTER
zolpidem (AMBIEN) 5 MG tablet      Last Written Prescription Date:  1/2/18  Last Fill Quantity: 30,   # refills: 0  Last Office Visit: 11/27/17  Future Office visit:       Routing refill request to provider for review/approval because:  Drug not on the [facility], [facility] or [facility] refill protocol or controlled substance

## 2018-02-24 ENCOUNTER — MYC REFILL (OUTPATIENT)
Dept: INTERNAL MEDICINE | Facility: CLINIC | Age: 63
End: 2018-02-24

## 2018-02-24 DIAGNOSIS — Z98.84 S/P GASTRIC BYPASS: ICD-10-CM

## 2018-02-24 DIAGNOSIS — I10 BENIGN ESSENTIAL HYPERTENSION: ICD-10-CM

## 2018-02-24 RX ORDER — LOSARTAN POTASSIUM 100 MG/1
50 TABLET ORAL DAILY
Qty: 30 TABLET | Status: CANCELLED | OUTPATIENT
Start: 2018-02-24

## 2018-02-26 NOTE — TELEPHONE ENCOUNTER
Message from MyChart:  Original authorizing provider: MD Ilana Hendricks would like a refill of the following medications:  ferrous sulfate (SLO-FE) 142 (45 FE) MG TBCR [Prema Rodgers MD]  losartan (COZAAR) 100 MG tablet [Prema Rodgers MD]    Preferred pharmacy: [address]    Comment:

## 2018-02-27 ENCOUNTER — OFFICE VISIT (OUTPATIENT)
Dept: INTERNAL MEDICINE | Facility: CLINIC | Age: 63
End: 2018-02-27
Payer: COMMERCIAL

## 2018-02-27 VITALS
OXYGEN SATURATION: 98 % | HEIGHT: 63 IN | WEIGHT: 171.5 LBS | HEART RATE: 45 BPM | DIASTOLIC BLOOD PRESSURE: 72 MMHG | BODY MASS INDEX: 30.39 KG/M2 | SYSTOLIC BLOOD PRESSURE: 130 MMHG | TEMPERATURE: 97.9 F

## 2018-02-27 DIAGNOSIS — E03.9 HYPOTHYROIDISM, UNSPECIFIED TYPE: ICD-10-CM

## 2018-02-27 DIAGNOSIS — R00.1 BRADYCARDIA: ICD-10-CM

## 2018-02-27 DIAGNOSIS — I10 BENIGN ESSENTIAL HYPERTENSION: ICD-10-CM

## 2018-02-27 DIAGNOSIS — Z98.84 S/P GASTRIC BYPASS: ICD-10-CM

## 2018-02-27 DIAGNOSIS — M79.7 FIBROMYALGIA: Primary | ICD-10-CM

## 2018-02-27 LAB
ALBUMIN SERPL-MCNC: 3.8 G/DL (ref 3.4–5)
ALP SERPL-CCNC: 65 U/L (ref 40–150)
ALT SERPL W P-5'-P-CCNC: 20 U/L (ref 0–50)
ANION GAP SERPL CALCULATED.3IONS-SCNC: 7 MMOL/L (ref 3–14)
AST SERPL W P-5'-P-CCNC: 22 U/L (ref 0–45)
BILIRUB SERPL-MCNC: 0.8 MG/DL (ref 0.2–1.3)
BUN SERPL-MCNC: 13 MG/DL (ref 7–30)
CALCIUM SERPL-MCNC: 9.1 MG/DL (ref 8.5–10.1)
CHLORIDE SERPL-SCNC: 107 MMOL/L (ref 94–109)
CO2 SERPL-SCNC: 26 MMOL/L (ref 20–32)
CREAT SERPL-MCNC: 0.94 MG/DL (ref 0.52–1.04)
DEPRECATED CALCIDIOL+CALCIFEROL SERPL-MC: 40 UG/L (ref 20–75)
ERYTHROCYTE [DISTWIDTH] IN BLOOD BY AUTOMATED COUNT: 14.3 % (ref 10–15)
FERRITIN SERPL-MCNC: 53 NG/ML (ref 8–252)
GFR SERPL CREATININE-BSD FRML MDRD: 60 ML/MIN/1.7M2
GLUCOSE SERPL-MCNC: 80 MG/DL (ref 70–99)
HCT VFR BLD AUTO: 40.1 % (ref 35–47)
HGB BLD-MCNC: 12.7 G/DL (ref 11.7–15.7)
IRON SATN MFR SERPL: 39 % (ref 15–46)
IRON SERPL-MCNC: 139 UG/DL (ref 35–180)
MAGNESIUM SERPL-MCNC: 2.1 MG/DL (ref 1.6–2.3)
MCH RBC QN AUTO: 29.2 PG (ref 26.5–33)
MCHC RBC AUTO-ENTMCNC: 31.7 G/DL (ref 31.5–36.5)
MCV RBC AUTO: 92 FL (ref 78–100)
PHOSPHATE SERPL-MCNC: 3.7 MG/DL (ref 2.5–4.5)
PLATELET # BLD AUTO: 171 10E9/L (ref 150–450)
POTASSIUM SERPL-SCNC: 4.3 MMOL/L (ref 3.4–5.3)
PROT SERPL-MCNC: 7 G/DL (ref 6.8–8.8)
RBC # BLD AUTO: 4.35 10E12/L (ref 3.8–5.2)
SODIUM SERPL-SCNC: 140 MMOL/L (ref 133–144)
T4 FREE SERPL-MCNC: 1.04 NG/DL (ref 0.76–1.46)
TIBC SERPL-MCNC: 354 UG/DL (ref 240–430)
TSH SERPL DL<=0.005 MIU/L-ACNC: 1.83 MU/L (ref 0.4–4)
VIT B12 SERPL-MCNC: 2156 PG/ML (ref 193–986)
WBC # BLD AUTO: 5.7 10E9/L (ref 4–11)

## 2018-02-27 PROCEDURE — 84100 ASSAY OF PHOSPHORUS: CPT | Performed by: INTERNAL MEDICINE

## 2018-02-27 PROCEDURE — 82306 VITAMIN D 25 HYDROXY: CPT | Performed by: INTERNAL MEDICINE

## 2018-02-27 PROCEDURE — 83735 ASSAY OF MAGNESIUM: CPT | Performed by: INTERNAL MEDICINE

## 2018-02-27 PROCEDURE — 82607 VITAMIN B-12: CPT | Performed by: INTERNAL MEDICINE

## 2018-02-27 PROCEDURE — 80053 COMPREHEN METABOLIC PANEL: CPT | Performed by: INTERNAL MEDICINE

## 2018-02-27 PROCEDURE — 82728 ASSAY OF FERRITIN: CPT | Performed by: INTERNAL MEDICINE

## 2018-02-27 PROCEDURE — 93000 ELECTROCARDIOGRAM COMPLETE: CPT | Performed by: INTERNAL MEDICINE

## 2018-02-27 PROCEDURE — 84443 ASSAY THYROID STIM HORMONE: CPT | Performed by: INTERNAL MEDICINE

## 2018-02-27 PROCEDURE — 84439 ASSAY OF FREE THYROXINE: CPT | Performed by: INTERNAL MEDICINE

## 2018-02-27 PROCEDURE — 83550 IRON BINDING TEST: CPT | Performed by: INTERNAL MEDICINE

## 2018-02-27 PROCEDURE — 83540 ASSAY OF IRON: CPT | Performed by: INTERNAL MEDICINE

## 2018-02-27 PROCEDURE — 99214 OFFICE O/P EST MOD 30 MIN: CPT | Performed by: INTERNAL MEDICINE

## 2018-02-27 PROCEDURE — 85027 COMPLETE CBC AUTOMATED: CPT | Performed by: INTERNAL MEDICINE

## 2018-02-27 PROCEDURE — 36415 COLL VENOUS BLD VENIPUNCTURE: CPT | Performed by: INTERNAL MEDICINE

## 2018-02-27 RX ORDER — LOSARTAN POTASSIUM 100 MG/1
100 TABLET ORAL DAILY
Qty: 90 TABLET | Refills: 3 | Status: SHIPPED | OUTPATIENT
Start: 2018-02-27 | End: 2018-11-16

## 2018-02-27 RX ORDER — OMEGA-3 FATTY ACIDS/FISH OIL 300-1000MG
200 CAPSULE ORAL EVERY 6 HOURS PRN
COMMUNITY
Start: 2018-02-27 | End: 2022-03-15

## 2018-02-27 RX ORDER — DULOXETIN HYDROCHLORIDE 60 MG/1
60 CAPSULE, DELAYED RELEASE ORAL DAILY
Qty: 30 CAPSULE | Refills: 1 | Status: SHIPPED | OUTPATIENT
Start: 2018-02-27 | End: 2018-03-24

## 2018-02-27 NOTE — TELEPHONE ENCOUNTER
Patient was seen today. Already should have refills on file but any changes needed after appt or continue current dose?

## 2018-02-27 NOTE — PROGRESS NOTES
"  SUBJECTIVE:                                                      HPI: Ilana Shin is a pleasant 62 year old female who presents with worsening fibromyalgia pain:    Has had fibromyalgia for many years, but has been able to manage pain well with Tylenol and ibuprofen as needed.  Has never tried any other medications for management of FM pain.    Has noticed worsening of her fibromyalgia over the last 6 months.    Patient is also following up on her blood pressure. At last visit I recommended a TRIAL of decreasing her losartan to 50 mg daily (from 100mg daily). Patient reports that her blood pressures became too elevated (>140/80), so she re-increased her losartan to 100 mg daily. Her blood pressure today is borderline, but acceptable.    Patient is also following up on her iron studies. She is s/p [facility]ford-en-Y gastric bypass and was found to be iron deficient last year. FIT test August, 2017 was negative.  She has been using her iron supplements more consistently since.     Patient is also following up on her thyroid levels. They were noted to be significantly abnormal in June 2017.  Levothyroxine was adjusted and TFTs were within normal limits and October 2017. Repeat TFTs recommended to ensure stability.     Finally, patient is noted to be bradycardic on exam.  She has been borderline bradycardic/bradycardic for many years, but her heart rates have been staying in the 50s-60s.  Heart rate of 45 represents progression of her bradycardia.    She is asymptomatic from a bradycardia perspective: No chest pain, shortness of breath, dyspnea on exertion lightheadedness, presyncope, or syncope.    The medication, allergy, and problem lists have been reviewed and updated as appropriate.       OBJECTIVE:                                                      /72 (BP Location: Left arm, Patient Position: Chair, Cuff Size: Adult Regular)  Pulse (!) 45  Temp 97.9  F (36.6  C) (Oral)  Ht 5' 2.5\" (1.588 m)  Wt 171 lb 8 oz " (77.8 kg)  LMP  (LMP Unknown)  SpO2 98%  BMI 30.87 kg/m2  Constitutional: well-appearing  Cardiovascular: bradycardic, but regular  Psych: normal judgment and insight; normal mood and affect; recent and remote memory intact      ASSESSMENT/PLAN:                                                      (M79.7) Fibromyalgia  (primary encounter diagnosis)  Comment: no longer well-controlled with Tylenol and ibuprofen.  Plan:    - TRIAL of  Cymbalta 60 mg daily.   - follow-up via MyChart, TE, or OV in 6-8 weeks re: efficacy.    - additional or alternative options include gabapentin and Lyrica.    (I10) Benign essential hypertension  Comment: borderline control with losartan 100 mg daily.  Plan: CPM - refills provided.    (R00.1) Bradycardia  Comment:    - asymptomatic.   - chronic, but seems to be getting progressively slower over time - ?sick sinus syndrome.   Plan:    - EKG today.    - CBC, CMP, magnesium, phosphorus, and TFTs today.   - if labs unrevealing, patient may benefit from cardiology evaluation.    (Z98.84) S/P gastric bypass  Plan: CBC, iron studies, vitamin B12 level, and vitamin D level today.    (E03.9) Hypothyroidism, unspecified type  Comment: on levothyroxine 100 mcg daily.  Plan: TFTs with above labs.    The instructions on the AVS were discussed and explained to the patient. Patient expressed understanding of instructions.    (Chart documentation was completed, in part, with [employer] voice-recognition software. Even though reviewed, some grammatical, spelling, and word errors may remain.)    Prema Rodgers MD   [address] [address]  T: [phone], F: [phone]

## 2018-02-27 NOTE — PATIENT INSTRUCTIONS
EKG today for low heart rate.     ---    Labs today.    ---    START Cymbalta 60mg daily.    CONTINUE Losartan 100mg daily (full pill!)    ---

## 2018-02-27 NOTE — MR AVS SNAPSHOT
After Visit Summary   2/27/2018    Ilana Shin    MRN: [id number]           Patient Information     Date Of Birth          1955        Visit Information        Provider Department      2/27/2018 9:30 AM Prema Rodgers MD [facility]        Today's Diagnoses     Fibromyalgia    -  1    Bradycardia        Benign essential hypertension        Hypothyroidism, unspecified type        S/P gastric bypass          Care Instructions    EKG today for low heart rate.     ---    Labs today.    ---    START Cymbalta 60mg daily.    CONTINUE Losartan 100mg daily (full pill!)    ---              Follow-ups after your visit        Who to contact     If you have questions or need follow up information about today's clinic visit or your schedule please contact [facility] directly at [phone].  Normal or non-critical lab and imaging results will be communicated to you by MyChart, letter or phone within 4 business days after the clinic has received the results. If you do not hear from us within 7 days, please contact the clinic through [employer] or phone. If you have a critical or abnormal lab result, we will notify you by phone as soon as possible.  Submit refill requests through [employer] or call your pharmacy and they will forward the refill request to us. Please allow 3 business days for your refill to be completed.          Additional Information About Your Visit        MyChart Information     [employer] gives you secure access to your electronic health record. If you see a primary care provider, you can also send messages to your care team and make appointments. If you have questions, please call your primary care clinic.  If you do not have a primary care provider, please call [phone] and they will assist you.        Care EveryWhere ID     This is your Care EveryWhere ID. This could be used by other organizations to access your [facility]  "records  [id number]        Your Vitals Were     Pulse Temperature Height Last Period Pulse Oximetry BMI (Body Mass Index)    45 97.9  F (36.6  C) (Oral) 5' 2.5\" (1.588 m) (LMP Unknown) 98% 30.87 kg/m2       Blood Pressure from Last 3 Encounters:   02/27/18 130/80   01/03/18 148/68   11/27/17 100/60    Weight from Last 3 Encounters:   02/27/18 171 lb 8 oz (77.8 kg)   11/27/17 168 lb 1.6 oz (76.2 kg)   08/15/17 169 lb 3.2 oz (76.7 kg)              We Performed the Following     CBC with platelets     Comprehensive metabolic panel     EKG 12-lead complete w/read - Clinics     Ferritin     Iron and iron binding capacity     T4 free     TSH     Vitamin B12     Vitamin D Deficiency          Today's Medication Changes          These changes are accurate as of 2/27/18  9:52 AM.  If you have any questions, ask your nurse or doctor.               Start taking these medicines.        Dose/Directions    DULoxetine 60 MG EC capsule   Commonly known as:  CYMBALTA   Used for:  Fibromyalgia   Started by:  Prema Rodgers MD        Dose:  60 mg   Take 1 capsule (60 mg) by mouth daily   Quantity:  30 capsule   Refills:  1         These medicines have changed or have updated prescriptions.        Dose/Directions    losartan 100 MG tablet   Commonly known as:  COZAAR   This may have changed:  how much to take   Used for:  Benign essential hypertension   Changed by:  Prema Rodgers MD        Dose:  100 mg   Take 1 tablet (100 mg) by mouth daily   Quantity:  90 tablet   Refills:  3            Where to get your medicines      These medications were sent to [facility] [address] [address]     Phone:  [phone]     DULoxetine 60 MG EC capsule    losartan 100 MG tablet                Primary Care Provider Office Phone # Fax #    Prema Rodgers [phone]       [address] [place] [address]        Equal Access to Services     [facility] " GAAR : Hadii aad ku gonzález Fonseca, waaxda luqadaha, qaybta kaalmada adedeion, waxabelardo dagmar haybecki kimshylanasrin woods . So [facility] [phone].    ATENCIÓN: Si habla español, tiene a roper disposición servicios gratuitos de asistencia lingüística. Llame al [phone].    We comply with applicable federal civil rights laws and [place] laws. We do not discriminate on the basis of race, color, national origin, age, disability, sex, sexual orientation, or gender identity.            Thank you!     Thank you for choosing [facility]  for your care. Our goal is always to provide you with excellent care. Hearing back from our patients is one way we can continue to improve our services. Please take a few minutes to complete the written survey that you may receive in the mail after your visit with us. Thank you!             Your Updated Medication List - Protect others around you: Learn how to safely use, store and throw away your medicines at www.disposemymeds.org.          This list is accurate as of 2/27/18  9:52 AM.  Always use your most recent med list.                   Brand Name Dispense Instructions for use Diagnosis    cyanocolbalamin 500 MCG tablet    vitamin  B-12     Take 2 tablets by mouth daily Chews        DULoxetine 60 MG EC capsule    CYMBALTA    30 capsule    Take 1 capsule (60 mg) by mouth daily    Fibromyalgia       ferrous sulfate 142 (45 FE) MG Tbcr    SLO-FE    90 tablet    Take 1 tablet (142 mg) by mouth daily    S/P gastric bypass       fluticasone 50 MCG/ACT spray    FLONASE    16 mL    USE ONE TO TWO SPRAYS IN EACH NOSTRIL EVERY EVENING    Chronic vasomotor rhinitis       ibuprofen 200 MG capsule      Take 200 mg by mouth every 6 hours as needed for fever        levothyroxine 100 MCG tablet    SYNTHROID/LEVOTHROID    90 tablet    Take 1 tablet (100 mcg) by mouth daily    Hypothyroidism, unspecified type       losartan 100 MG tablet    COZAAR    90 tablet    Take 1  tablet (100 mg) by mouth daily    Benign essential hypertension       vitamin D 2000 UNITS Caps      Take 1 capsule by mouth daily        zolpidem 5 MG tablet    AMBIEN    30 tablet    Take 1 tablet (5 mg) by mouth nightly as needed for sleep    Insomnia, unspecified type

## 2018-03-05 DIAGNOSIS — G47.00 INSOMNIA, UNSPECIFIED TYPE: ICD-10-CM

## 2018-03-05 NOTE — TELEPHONE ENCOUNTER
Requested Prescriptions   Pending Prescriptions Disp Refills     zolpidem (AMBIEN) 5 MG tablet  Last Written Prescription Date:  01/31/2018  Last Fill Quantity: 30,  # refills: 0   Last office visit: 2/27/2018 with prescribing provider:     Future Office Visit:     30 tablet 0     Sig: Take 1 tablet (5 mg) by mouth nightly as needed for sleep    There is no refill protocol information for this order

## 2018-03-06 RX ORDER — ZOLPIDEM TARTRATE 5 MG/1
5 TABLET ORAL
Qty: 30 TABLET | Refills: 0 | Status: SHIPPED | OUTPATIENT
Start: 2018-03-06 | End: 2018-04-04

## 2018-03-13 ENCOUNTER — OFFICE VISIT (OUTPATIENT)
Dept: CARDIOLOGY | Facility: CLINIC | Age: 63
End: 2018-03-13
Attending: INTERNAL MEDICINE
Payer: COMMERCIAL

## 2018-03-13 VITALS
DIASTOLIC BLOOD PRESSURE: 66 MMHG | SYSTOLIC BLOOD PRESSURE: 130 MMHG | HEIGHT: 63 IN | BODY MASS INDEX: 30.07 KG/M2 | HEART RATE: 60 BPM | WEIGHT: 169.7 LBS

## 2018-03-13 DIAGNOSIS — I49.5 SICK SINUS SYNDROME (H): ICD-10-CM

## 2018-03-13 DIAGNOSIS — R01.1 HEART MURMUR: ICD-10-CM

## 2018-03-13 DIAGNOSIS — R00.1 SINUS BRADYCARDIA: Primary | ICD-10-CM

## 2018-03-13 DIAGNOSIS — I48.0 PAROXYSMAL ATRIAL FIBRILLATION (H): ICD-10-CM

## 2018-03-13 PROCEDURE — 99204 OFFICE O/P NEW MOD 45 MIN: CPT | Performed by: INTERNAL MEDICINE

## 2018-03-13 NOTE — MR AVS SNAPSHOT
After Visit Summary   3/13/2018    Ilana Shin    MRN: [id number]           Patient Information     Date Of Birth          1955        Visit Information        Provider Department      3/13/2018 3:15 PM Luis A Melo MD [facility]        Today's Diagnoses     Sinus bradycardia    -  1    Paroxysmal atrial fibrillation (H)        Sick sinus syndrome (H)        Heart murmur           Follow-ups after your visit        Your next 10 appointments already scheduled     Mar 23, 2018  8:30 AM CDT   Ech Complete with [id number]   [facility]    [address]  [facility] [address]   [phone]           1. Please bring or wear a comfortable two-piece outfit. 2. You may eat, drink and take your normal medicines. 3. For any questions that cannot be answered, please contact the ordering physician ***Please check-in at the [employer] Registration Office located in [address] in the [facility] building. When you are finished registering, please go to [address] and have a seat. The technician will call your name for the test.            Mar 23, 2018 10:00 AM CDT   Holter Monitor with RSCC DEVICE [facility]    [address]  [facility] [id number] [phone]           LOCATION - [address], [address] [place] [address] **Please check-in at the [employer] Registration Office, [address], in the [facility] building. When you are finished registering, please go to [address] and have a seat.              Future tests that were ordered for you today     Open Future Orders        Priority Expected Expires Ordered    Holter Monitor 48 hour - Adult Routine 3/20/2018 3/13/2019 3/13/2018    Echocardiogram Routine 3/20/2018 3/13/2019 3/13/2018            Who to contact     If  "you have questions or need follow up information about today's clinic visit or your schedule please contact [facility] directly at [phone].  Normal or non-critical lab and imaging results will be communicated to you by MyChart, letter or phone within 4 business days after the clinic has received the results. If you do not hear from us within 7 days, please contact the clinic through [employer] or phone. If you have a critical or abnormal lab result, we will notify you by phone as soon as possible.  Submit refill requests through [employer] or call your pharmacy and they will forward the refill request to us. Please allow 3 business days for your refill to be completed.          Additional Information About Your Visit        [employer] Information     [employer] gives you secure access to your electronic health record. If you see a primary care provider, you can also send messages to your care team and make appointments. If you have questions, please call your primary care clinic.  If you do not have a primary care provider, please call [phone] and they will assist you.        Care EveryWhere ID     This is your Care EveryWhere ID. This could be used by other organizations to access your [facility] medical records  [id number]        Your Vitals Were     Pulse Height Last Period Breastfeeding? BMI (Body Mass Index)       60 1.588 m (5' 2.5\") (LMP Unknown) No 30.54 kg/m2        Blood Pressure from Last 3 Encounters:   03/13/18 130/66   02/27/18 130/72   01/03/18 148/68    Weight from Last 3 Encounters:   03/13/18 77 kg (169 lb 11.2 oz)   02/27/18 77.8 kg (171 lb 8 oz)   11/27/17 76.2 kg (168 lb 1.6 oz)               Primary Care Provider Office Phone # Fax #    Prema Rodgers [phone]       [address] [place] [address]        Equal Access to Services     LINDSEY JORGE AH: Callie Fonseca, waviryda luqavelina, qaybta topher tee " dagmar kimtimothy la'aan ah. So [facility] [phone].    ATENCIÓN: Si maria victoria pierre, tiene a roper disposición servicios gratuitos de asistencia lingüística. Buzz doherty [phone].    We comply with applicable federal civil rights laws and [place] laws. We do not discriminate on the basis of race, color, national origin, age, disability, sex, sexual orientation, or gender identity.            Thank you!     Thank you for choosing [facility]  for your care. Our goal is always to provide you with excellent care. Hearing back from our patients is one way we can continue to improve our services. Please take a few minutes to complete the written survey that you may receive in the mail after your visit with us. Thank you!             Your Updated Medication List - Protect others around you: Learn how to safely use, store and throw away your medicines at www.disposemymeds.org.          This list is accurate as of 3/13/18  4:04 PM.  Always use your most recent med list.                   Brand Name Dispense Instructions for use Diagnosis    cyanocolbalamin 500 MCG tablet    vitamin  B-12     Take 2 tablets by mouth daily Chews        DULoxetine 60 MG EC capsule    CYMBALTA    30 capsule    Take 1 capsule (60 mg) by mouth daily    Fibromyalgia       ferrous sulfate 142 (45 FE) MG Tbcr    SLO-FE    90 tablet    Take 1 tablet (142 mg) by mouth daily    S/P gastric bypass       fluticasone 50 MCG/ACT spray    FLONASE    16 mL    USE ONE TO TWO SPRAYS IN EACH NOSTRIL EVERY EVENING    Chronic vasomotor rhinitis       ibuprofen 200 MG capsule      Take 200 mg by mouth every 6 hours as needed for fever        levothyroxine 100 MCG tablet    SYNTHROID/LEVOTHROID    90 tablet    Take 1 tablet (100 mcg) by mouth daily    Hypothyroidism, unspecified type       losartan 100 MG tablet    COZAAR    90 tablet    Take 1 tablet (100 mg) by mouth daily    Benign essential hypertension        vitamin D 2000 UNITS Caps      Take 1 capsule by mouth daily        zolpidem 5 MG tablet    AMBIEN    30 tablet    Take 1 tablet (5 mg) by mouth nightly as needed for sleep    Insomnia, unspecified type

## 2018-03-13 NOTE — LETTER
"3/13/2018    Prema Rodgers MD  [address] [facility] [address]    RE: Ilana Shin       Dear Colleague,    I had the pleasure of seeing Ilana Shin in the [facility].    HISTORY:    Ilana Shin is a pleasant but reserved 62-year-old female who was asked to see me because of bradycardia found on a routine physical exam recently. She has a history of hypertension, gastric bypass surgery, hypothyroidism, and fibromyalgia.    In June 2016 the patient presented to her primary care clinic with complaints of a prolonged episode of palpitations. She was examined by her primary caregiver and was found to have tachycardia with what was described as an irregularly irregular pulse. She was sent to the emergency room but her rhythm normalized before she got there, and the presumed atrial fibrillation was never recorded. The episode lasted several hours.    Ilana reports now that she has what sounds like relatively frequent episodes of similar symptoms. She has a history of gastric bypass and observes sugar rapidly as a result. She thinks that sugar is the trigger for most of these episodes, and as she describes it \"I eat a lot of sugar\". However she is relatively vague in describing her symptoms. It sounds like they are relatively brief lasting less than an hour but usually more than a minute or 2. She can't really say how often she has these and states that she tries to ignore them and that they don't really bother her much. She has had no further prolonged episodes since the one described above in 2016.    The patient has a history of fibromyalgia with diffuse pain. She recently presented to her primary care clinic with complaints of worsening of her discomfort and was found to have bradycardia on examination. An ECG was done which confirmed sinus bradycardia at a rate of 45 bpm. She has been bradycardic in the past but her heart rate is substantially lower than previously. She is " completely unaware of this and denies symptoms of easy fatigability dyspnea on exertion or other symptoms likely due to bradycardia. She is on thyroid replacement and her TSH was normal.    The patient denies any episodes of syncope or near syncope, strokelike symptoms, palpitations except as described above, exertional chest, arm, neck, or jaw discomfort, PND orthopnea, peripheral edema, or claudication. He also has not had problems with orthostasis.    Cardiac risk factors are minimal. She is an ex smoker having quit in 2002. There is no history of diabetes or hyperlipidemia. She does have a history of controlled hypertension. No family history of premature coronary disease.    Today's examination shows bounding pulses and a 1 to 2/6 holosystolic murmur best heard at the apex.      ASSESSMENT/PLAN:    1.  Bradycardia. ECG shows sinus bradycardia with no other significant abnormalities. Her bradycardia seems to be asymptomatic on careful questioning. She is not on any medications that would be contributing to this. At the present time, we will simply continue to watchfully wait.  If she develops symptoms she will need a pacemaker placed. An echocardiogram will be done to make sure she does not have any structural abnormalities and to evaluate the audible murmur.  2. Possible atrial fibrillation. This sounds highly suspicious and would signify likely sick sinus syndrome with bradycardia and intermittent atrial fib. It seems like her episodes of tachycardia are relatively frequent, although she shrugs them off. A 48-hour Holter monitor will be arranged. At this point, with a CHADS-VASC score of 2 (points for hypertension and female), and with an age approaching 64 years, I would be tempted to consider anticoagulation particularly if we find a high burden of atrial fibrillation.  3. Hypertension. Adequate control, continue current medications.    Thank you for asking me to participate in your patient's care. Please  don't hesitate to call if I can be of further assistance.    Orders Placed This Encounter   Procedures     Holter Monitor 48 hour - Adult     Echocardiogram     No orders of the defined types were placed in this encounter.    There are no discontinued medications.      Encounter Diagnoses   Name Primary?     Sinus bradycardia Yes     Paroxysmal atrial fibrillation (H)      Sick sinus syndrome (H)      Heart murmur        CURRENT MEDICATIONS:  Current Outpatient Prescriptions   Medication Sig Dispense Refill     zolpidem (AMBIEN) 5 MG tablet Take 1 tablet (5 mg) by mouth nightly as needed for sleep 30 tablet 0     ferrous sulfate (SLO-FE) 142 (45 FE) MG TBCR Take 1 tablet (142 mg) by mouth daily 90 tablet 3     ibuprofen 200 MG capsule Take 200 mg by mouth every 6 hours as needed for fever       DULoxetine (CYMBALTA) 60 MG EC capsule Take 1 capsule (60 mg) by mouth daily 30 capsule 1     losartan (COZAAR) 100 MG tablet Take 1 tablet (100 mg) by mouth daily 90 tablet 3     levothyroxine (SYNTHROID/LEVOTHROID) 100 MCG tablet Take 1 tablet (100 mcg) by mouth daily 90 tablet 3     fluticasone (FLONASE) 50 MCG/ACT spray USE ONE TO TWO SPRAYS IN EACH NOSTRIL EVERY EVENING 16 mL 9     cyanocolbalamin (VITAMIN  B-12) 500 MCG tablet Take 2 tablets by mouth daily Chews       Cholecalciferol (VITAMIN D) 2000 UNITS CAPS Take 1 capsule by mouth daily         ALLERGIES     Allergies   Allergen Reactions     Codeine Sulfate GI Disturbance       PAST MEDICAL HISTORY:  Past Medical History:   Diagnosis Date     Bradycardia      Fibromyalgia      Hypothyroidism      Persistent insomnia        PAST SURGICAL HISTORY:  Past Surgical History:   Procedure Laterality Date     BUNIONECTOMY Right 8/2014     BUNIONECTOMY LADONNA, REPAIR HAMMER TOE(S), COMBINED  09/2003    Left foot     C LAPAROSCOPIC JASS EN Y GASTROJEJUNOSTOMY  2006     CHOLECYSTECTOMY, OPEN  1994     RELEASE CARPAL TUNNEL BILATERAL  1999     TUBAL LIGATION  1984    age 29  "      FAMILY HISTORY:  Family History   Problem Relation Age of Onset     Hypertension Mother      Type 2 Diabetes Father      Hypertension Father      Breast Cancer Paternal Grandmother      Breast Cancer Sister 45     Myocardial Infarction Sister      Lung Cancer Brother      Hypertension Sister      x2     CEREBROVASCULAR DISEASE No family hx of      Coronary Artery Disease Early Onset No family hx of      Colon Cancer No family hx of      Ovarian Cancer No family hx of        SOCIAL HISTORY:  Social History     Social History     Marital status:      Spouse name: N/A     Number of children: N/A     Years of education: N/A     Occupational History           Social History Main Topics     Smoking status: Former Smoker     Packs/day: 1.00     Years: 20.00     Types: Cigarettes     Quit date: 1/1/2002     Smokeless tobacco: Never Used     Alcohol use No     Drug use: No     Sexual activity: No     Other Topics Concern     Parent/Sibling W/ Cabg, Mi Or Angioplasty Before 65f 55m? No     Bike Helmet Yes     Seat Belt Yes     Social History Narrative    .    2 adult kids.    4 grand kids.    No formal exercise.        Review of Systems:  Skin:  Positive for bruising   Eyes:  Positive for glasses  ENT:  Positive for nasal congestion;tinnitus  Respiratory:  Negative    Cardiovascular:    palpitations;Positive for;fatigue  Gastroenterology: Negative    Genitourinary:  Negative    Musculoskeletal:  Positive for back pain;neck pain;joint pain  Neurologic:  Negative    Psychiatric:  Positive for sleep disturbances;excessive stress  Heme/Lymph/Imm:  Negative    Endocrine:  Positive for thyroid disorder    Physical Exam:  Vitals: /66 (BP Location: Right arm, Patient Position: Sitting, Cuff Size: Adult Regular)  Pulse 60  Ht 1.588 m (5' 2.5\")  Wt 77 kg (169 lb 11.2 oz)  LMP  (LMP Unknown)  Breastfeeding? No  BMI 30.54 kg/m2    Constitutional:  cooperative, alert and oriented, well " developed, well nourished, in no acute distress        Skin:  warm and dry to the touch        Head:  normocephalic        Eyes:  no xanthalasma        ENT:  no pallor or cyanosis, dentition good        Neck:  carotid pulses are full and equal bilaterally, JVP normal, no carotid bruit   bounding carotids    Chest:  normal breath sounds, clear to auscultation, normal A-P diameter, normal symmetry, normal respiratory excursion, no use of accessory muscles        Cardiac: regular rhythm;normal S1 and S2;no S3 or S4;apical impulse not displaced       holosystolic murmur;grade 1;radiation to the axilla          Abdomen:  abdomen soft;BS normoactive        Vascular: pulses full and equal                                 bounding pulses     Extremities and Back:  no edema        Neurological:  no gross motor deficits          Recent Lab Results:  LIPID RESULTS:  Lab Results   Component Value Date    CHOL 184 06/08/2017    HDL 75 06/08/2017    LDL 94 06/08/2017    TRIG 76 06/08/2017    CHOLHDLRATIO 3.0 07/21/2014       LIVER ENZYME RESULTS:  Lab Results   Component Value Date    AST 22 02/27/2018    ALT 20 02/27/2018       CBC RESULTS:  Lab Results   Component Value Date    WBC 5.7 02/27/2018    RBC 4.35 02/27/2018    HGB 12.7 02/27/2018    HCT 40.1 02/27/2018    MCV 92 02/27/2018    MCH 29.2 02/27/2018    MCHC 31.7 02/27/2018    RDW 14.3 02/27/2018     02/27/2018       BMP RESULTS:  Lab Results   Component Value Date     02/27/2018    POTASSIUM 4.3 02/27/2018    CHLORIDE 107 02/27/2018    CO2 26 02/27/2018    ANIONGAP 7 02/27/2018    GLC 80 02/27/2018    BUN 13 02/27/2018    CR 0.94 02/27/2018    GFRESTIMATED 60 (L) 02/27/2018    GFRESTBLACK 72 02/27/2018    TIFFANY 9.1 02/27/2018        A1C RESULTS:  Lab Results   Component Value Date    A1C 5.8 07/21/2015       INR RESULTS:  Lab Results   Component Value Date    INR 0.89 08/27/2013    INR 1.04 08/15/2006               Thank you for allowing me to participate in  the care of your patient.    Sincerely,     Luis A Melo MD     [facility], [place]

## 2018-03-13 NOTE — PROGRESS NOTES
"HISTORY:    Ilana Shin is a pleasant but reserved 62-year-old female who was asked to see me because of bradycardia found on a routine physical exam recently. She has a history of hypertension, gastric bypass surgery, hypothyroidism, and fibromyalgia.    In June 2016 the patient presented to her primary care clinic with complaints of a prolonged episode of palpitations. She was examined by her primary caregiver and was found to have tachycardia with what was described as an irregularly irregular pulse. She was sent to the emergency room but her rhythm normalized before she got there, and the presumed atrial fibrillation was never recorded. The episode lasted several hours.    Ilana reports now that she has what sounds like relatively frequent episodes of similar symptoms. She has a history of gastric bypass and observes sugar rapidly as a result. She thinks that sugar is the trigger for most of these episodes, and as she describes it \"I eat a lot of sugar\". However she is relatively vague in describing her symptoms. It sounds like they are relatively brief lasting less than an hour but usually more than a minute or 2. She can't really say how often she has these and states that she tries to ignore them and that they don't really bother her much. She has had no further prolonged episodes since the one described above in 2016.    The patient has a history of fibromyalgia with diffuse pain. She recently presented to her primary care clinic with complaints of worsening of her discomfort and was found to have bradycardia on examination. An ECG was done which confirmed sinus bradycardia at a rate of 45 bpm. She has been bradycardic in the past but her heart rate is substantially lower than previously. She is completely unaware of this and denies symptoms of easy fatigability dyspnea on exertion or other symptoms likely due to bradycardia. She is on thyroid replacement and her TSH was normal.    The patient denies any " episodes of syncope or near syncope, strokelike symptoms, palpitations except as described above, exertional chest, arm, neck, or jaw discomfort, PND orthopnea, peripheral edema, or claudication. He also has not had problems with orthostasis.    Cardiac risk factors are minimal. She is an ex smoker having quit in 2002. There is no history of diabetes or hyperlipidemia. She does have a history of controlled hypertension. No family history of premature coronary disease.    Today's examination shows bounding pulses and a 1 to 2/6 holosystolic murmur best heard at the apex.      ASSESSMENT/PLAN:    1.  Bradycardia. ECG shows sinus bradycardia with no other significant abnormalities. Her bradycardia seems to be asymptomatic on careful questioning. She is not on any medications that would be contributing to this. At the present time, we will simply continue to watchfully wait.  If she develops symptoms she will need a pacemaker placed. An echocardiogram will be done to make sure she does not have any structural abnormalities and to evaluate the audible murmur.  2. Possible atrial fibrillation. This sounds highly suspicious and would signify likely sick sinus syndrome with bradycardia and intermittent atrial fib. It seems like her episodes of tachycardia are relatively frequent, although she shrugs them off. A 48-hour Holter monitor will be arranged. At this point, with a CHADS-VASC score of 2 (points for hypertension and female), and with an age approaching 64 years, I would be tempted to consider anticoagulation particularly if we find a high burden of atrial fibrillation.  3. Hypertension. Adequate control, continue current medications.    Thank you for asking me to participate in your patient's care. Please don't hesitate to call if I can be of further assistance.    Orders Placed This Encounter   Procedures     Holter Monitor 48 hour - Adult     Echocardiogram     No orders of the defined types were placed in this  encounter.    There are no discontinued medications.      Encounter Diagnoses   Name Primary?     Sinus bradycardia Yes     Paroxysmal atrial fibrillation (H)      Sick sinus syndrome (H)      Heart murmur        CURRENT MEDICATIONS:  Current Outpatient Prescriptions   Medication Sig Dispense Refill     zolpidem (AMBIEN) 5 MG tablet Take 1 tablet (5 mg) by mouth nightly as needed for sleep 30 tablet 0     ferrous sulfate (SLO-FE) 142 (45 FE) MG TBCR Take 1 tablet (142 mg) by mouth daily 90 tablet 3     ibuprofen 200 MG capsule Take 200 mg by mouth every 6 hours as needed for fever       DULoxetine (CYMBALTA) 60 MG EC capsule Take 1 capsule (60 mg) by mouth daily 30 capsule 1     losartan (COZAAR) 100 MG tablet Take 1 tablet (100 mg) by mouth daily 90 tablet 3     levothyroxine (SYNTHROID/LEVOTHROID) 100 MCG tablet Take 1 tablet (100 mcg) by mouth daily 90 tablet 3     fluticasone (FLONASE) 50 MCG/ACT spray USE ONE TO TWO SPRAYS IN EACH NOSTRIL EVERY EVENING 16 mL 9     cyanocolbalamin (VITAMIN  B-12) 500 MCG tablet Take 2 tablets by mouth daily Chews       Cholecalciferol (VITAMIN D) 2000 UNITS CAPS Take 1 capsule by mouth daily         ALLERGIES     Allergies   Allergen Reactions     Codeine Sulfate GI Disturbance       PAST MEDICAL HISTORY:  Past Medical History:   Diagnosis Date     Bradycardia      Fibromyalgia      Hypothyroidism      Persistent insomnia        PAST SURGICAL HISTORY:  Past Surgical History:   Procedure Laterality Date     BUNIONECTOMY Right 8/2014     BUNIONECTOMY LADONNA, REPAIR HAMMER TOE(S), COMBINED  09/2003    Left foot     C LAPAROSCOPIC JASS EN Y GASTROJEJUNOSTOMY  2006     CHOLECYSTECTOMY, OPEN  1994     RELEASE CARPAL TUNNEL BILATERAL  1999     TUBAL LIGATION  1984    age 29       FAMILY HISTORY:  Family History   Problem Relation Age of Onset     Hypertension Mother      Type 2 Diabetes Father      Hypertension Father      Breast Cancer Paternal Grandmother      Breast Cancer Sister 45  "    Myocardial Infarction Sister      Lung Cancer Brother      Hypertension Sister      x2     CEREBROVASCULAR DISEASE No family hx of      Coronary Artery Disease Early Onset No family hx of      Colon Cancer No family hx of      Ovarian Cancer No family hx of        SOCIAL HISTORY:  Social History     Social History     Marital status:      Spouse name: N/A     Number of children: N/A     Years of education: N/A     Occupational History           Social History Main Topics     Smoking status: Former Smoker     Packs/day: 1.00     Years: 20.00     Types: Cigarettes     Quit date: 1/1/2002     Smokeless tobacco: Never Used     Alcohol use No     Drug use: No     Sexual activity: No     Other Topics Concern     Parent/Sibling W/ Cabg, Mi Or Angioplasty Before 65f 55m? No     Bike Helmet Yes     Seat Belt Yes     Social History Narrative    .    2 adult kids.    4 grand kids.    No formal exercise.        Review of Systems:  Skin:  Positive for bruising   Eyes:  Positive for glasses  ENT:  Positive for nasal congestion;tinnitus  Respiratory:  Negative    Cardiovascular:    palpitations;Positive for;fatigue  Gastroenterology: Negative    Genitourinary:  Negative    Musculoskeletal:  Positive for back pain;neck pain;joint pain  Neurologic:  Negative    Psychiatric:  Positive for sleep disturbances;excessive stress  Heme/Lymph/Imm:  Negative    Endocrine:  Positive for thyroid disorder    Physical Exam:  Vitals: /66 (BP Location: Right arm, Patient Position: Sitting, Cuff Size: Adult Regular)  Pulse 60  Ht 1.588 m (5' 2.5\")  Wt 77 kg (169 lb 11.2 oz)  LMP  (LMP Unknown)  Breastfeeding? No  BMI 30.54 kg/m2    Constitutional:  cooperative, alert and oriented, well developed, well nourished, in no acute distress        Skin:  warm and dry to the touch        Head:  normocephalic        Eyes:  no xanthalasma        ENT:  no pallor or cyanosis, dentition good        Neck:  carotid " pulses are full and equal bilaterally, JVP normal, no carotid bruit   bounding carotids    Chest:  normal breath sounds, clear to auscultation, normal A-P diameter, normal symmetry, normal respiratory excursion, no use of accessory muscles        Cardiac: regular rhythm;normal S1 and S2;no S3 or S4;apical impulse not displaced       holosystolic murmur;grade 1;radiation to the axilla          Abdomen:  abdomen soft;BS normoactive        Vascular: pulses full and equal                                 bounding pulses     Extremities and Back:  no edema        Neurological:  no gross motor deficits          Recent Lab Results:  LIPID RESULTS:  Lab Results   Component Value Date    CHOL 184 06/08/2017    HDL 75 06/08/2017    LDL 94 06/08/2017    TRIG 76 06/08/2017    CHOLHDLRATIO 3.0 07/21/2014       LIVER ENZYME RESULTS:  Lab Results   Component Value Date    AST 22 02/27/2018    ALT 20 02/27/2018       CBC RESULTS:  Lab Results   Component Value Date    WBC 5.7 02/27/2018    RBC 4.35 02/27/2018    HGB 12.7 02/27/2018    HCT 40.1 02/27/2018    MCV 92 02/27/2018    MCH 29.2 02/27/2018    MCHC 31.7 02/27/2018    RDW 14.3 02/27/2018     02/27/2018       BMP RESULTS:  Lab Results   Component Value Date     02/27/2018    POTASSIUM 4.3 02/27/2018    CHLORIDE 107 02/27/2018    CO2 26 02/27/2018    ANIONGAP 7 02/27/2018    GLC 80 02/27/2018    BUN 13 02/27/2018    CR 0.94 02/27/2018    GFRESTIMATED 60 (L) 02/27/2018    GFRESTBLACK 72 02/27/2018    TIFFANY 9.1 02/27/2018        A1C RESULTS:  Lab Results   Component Value Date    A1C 5.8 07/21/2015       INR RESULTS:  Lab Results   Component Value Date    INR 0.89 08/27/2013    INR 1.04 08/15/2006         Luis A Melo MD, FACC    CC  Prema Rodgers MD  [address] [place] [address]

## 2018-03-23 ENCOUNTER — HOSPITAL ENCOUNTER (OUTPATIENT)
Dept: CARDIOLOGY | Facility: CLINIC | Age: 63
Discharge: HOME | End: 2018-03-23
Attending: INTERNAL MEDICINE | Admitting: INTERNAL MEDICINE
Payer: COMMERCIAL

## 2018-03-23 ENCOUNTER — HOSPITAL ENCOUNTER (OUTPATIENT)
Dept: CARDIOLOGY | Facility: CLINIC | Age: 63
End: 2018-03-23
Attending: INTERNAL MEDICINE
Payer: COMMERCIAL

## 2018-03-23 DIAGNOSIS — R00.1 SINUS BRADYCARDIA: ICD-10-CM

## 2018-03-23 DIAGNOSIS — I49.5 SICK SINUS SYNDROME (H): ICD-10-CM

## 2018-03-23 DIAGNOSIS — R01.1 HEART MURMUR: ICD-10-CM

## 2018-03-23 DIAGNOSIS — I48.0 PAROXYSMAL ATRIAL FIBRILLATION (H): ICD-10-CM

## 2018-03-23 PROCEDURE — 93227 XTRNL ECG REC<48 HR R&I: CPT | Performed by: INTERNAL MEDICINE

## 2018-03-23 PROCEDURE — 93226 XTRNL ECG REC<48 HR SCAN A/R: CPT

## 2018-03-23 PROCEDURE — 93306 TTE W/DOPPLER COMPLETE: CPT

## 2018-03-23 PROCEDURE — 93306 TTE W/DOPPLER COMPLETE: CPT | Mod: 26 | Performed by: INTERNAL MEDICINE

## 2018-03-24 ENCOUNTER — MYC REFILL (OUTPATIENT)
Dept: INTERNAL MEDICINE | Facility: CLINIC | Age: 63
End: 2018-03-24

## 2018-03-24 DIAGNOSIS — J30.0 CHRONIC VASOMOTOR RHINITIS: ICD-10-CM

## 2018-03-24 DIAGNOSIS — M79.7 FIBROMYALGIA: ICD-10-CM

## 2018-03-26 NOTE — TELEPHONE ENCOUNTER
"Requested Prescriptions   Pending Prescriptions Disp Refills     fluticasone (FLONASE) 50 MCG/ACT spray 16 mL 9    Last Written Prescription Date:  10/05/2017  Last Fill Quantity: 16ml,  # refills: 9   Last office visit: 2/27/2018 with prescribing provider:  2/27/2018   Future Office Visit:     Sig: USE ONE TO TWO SPRAYS IN EACH NOSTRIL EVERY EVENING    Inhaled Steroids Protocol Failed    3/26/2018  8:57 AM       Failed - Asthma control assessment score within normal limits in last 6 months    Please review ACT score.          Passed - Patient is age 12 or older       Passed - Recent (6 mo) or future (30 days) visit within the authorizing provider's specialty    Patient had office visit in the last 6 months or has a visit in the next 30 days with authorizing provider or within the authorizing provider's specialty.  See \"Patient Info\" tab in inbasket, or \"Choose Columns\" in Meds & Orders section of the refill encounter.            DULoxetine (CYMBALTA) 60 MG EC capsule 30 capsule 1    Last Written Prescription Date:  2/27/2018  Last Fill Quantity: 30,  # refills: 1   Last office visit: 2/27/2018 with prescribing provider:  2/27/2018   Future Office Visit:     Sig: Take 1 capsule (60 mg) by mouth daily    Serotonin-Norepinephrine Reuptake Inhibitors  Passed    3/26/2018  8:57 AM       Passed - Blood pressure under 140/90 in past 12 months    BP Readings from Last 3 Encounters:   03/13/18 130/66   02/27/18 130/72   01/03/18 148/68                Passed - Recent (12 mo) or future (30 days) visit within the authorizing provider's specialty    Patient had office visit in the last 12 months or has a visit in the next 30 days with authorizing provider or within the authorizing provider's specialty.  See \"Patient Info\" tab in inbasket, or \"Choose Columns\" in Meds & Orders section of the refill encounter.           Passed - Patient is age 18 or older       Passed - No active pregnancy on record       Passed - No positive " pregnancy test in past 12 months

## 2018-03-26 NOTE — TELEPHONE ENCOUNTER
Message from MyChart:  Original authorizing provider: MD Ilana Hendricks would like a refill of the following medications:  fluticasone (FLONASE) 50 MCG/ACT spray [Prema Rodgers MD]  DULoxetine (CYMBALTA) 60 MG EC capsule [Prema Rodgers MD]    Preferred pharmacy: [address]    Comment:

## 2018-03-27 RX ORDER — DULOXETIN HYDROCHLORIDE 60 MG/1
60 CAPSULE, DELAYED RELEASE ORAL DAILY
Qty: 30 CAPSULE | Refills: 5 | Status: SHIPPED | OUTPATIENT
Start: 2018-03-27 | End: 2018-03-28

## 2018-03-27 RX ORDER — FLUTICASONE PROPIONATE 50 MCG
SPRAY, SUSPENSION (ML) NASAL
Qty: 16 ML | Refills: 3 | Status: SHIPPED | OUTPATIENT
Start: 2018-03-27 | End: 2018-04-24

## 2018-04-04 ENCOUNTER — TELEPHONE (OUTPATIENT)
Dept: CARDIOLOGY | Facility: CLINIC | Age: 63
End: 2018-04-04

## 2018-04-04 DIAGNOSIS — G47.00 INSOMNIA, UNSPECIFIED TYPE: ICD-10-CM

## 2018-04-04 DIAGNOSIS — I35.0 AORTIC STENOSIS: ICD-10-CM

## 2018-04-04 DIAGNOSIS — R00.1 BRADYCARDIA: Primary | ICD-10-CM

## 2018-04-04 RX ORDER — ZOLPIDEM TARTRATE 5 MG/1
5 TABLET ORAL
Qty: 30 TABLET | Refills: 0 | Status: SHIPPED | OUTPATIENT
Start: 2018-04-04 | End: 2018-05-05

## 2018-04-04 NOTE — TELEPHONE ENCOUNTER
ambien      Last Written Prescription Date:  3/6/18  Last Fill Quantity: 30,   # refills: 0  Last Office Visit: 11/27/17  Future Office visit:       Routing refill request to provider for review/approval because:  Drug not on the [facility], [facility] or [facility] refill protocol or controlled substance

## 2018-04-04 NOTE — TELEPHONE ENCOUNTER
Echo  Date performed: 03-23-18    Moderate valvular aortic stenosis.  The left ventricle is normal in size  Left ventricular systolic function is normal.  The visual ejection fraction is estimated at 60-65%.  The left atrium is moderately dilated.  Sinus rhythm was noted.  Grade II or moderate diastolic dysfunction.  No regional wall motion abnormalities noted.  -------------------------------------------------    Holter    Date performed: 03-23-18      ------------------------------------------------    Notes Recorded by Luis A Melo MD on 4/3/2018 at 11:54 PM  Holter and echo results reviewed.  Her Holter shows bradycardia, but asymptomatic and mostly nighttime.  She has some aortic stenosis we will need to watch.  Let's have her back to see me in about 3 months to review these studies, no action or changes needed at this time.  -----------------------------------------------    Order placed for f/u.     Called patient, patient was not available. I left patient a message to return my call.       TGarbers RN  [facility]

## 2018-04-04 NOTE — TELEPHONE ENCOUNTER
Results and recommendations were reviewed with patient over the phone. Scheduling will call patient to set up her 3 month follow up.     Filiberto FRANCO  [facility]

## 2018-04-12 ENCOUNTER — MYC REFILL (OUTPATIENT)
Dept: INTERNAL MEDICINE | Facility: CLINIC | Age: 63
End: 2018-04-12

## 2018-04-12 DIAGNOSIS — E03.9 HYPOTHYROIDISM, UNSPECIFIED TYPE: ICD-10-CM

## 2018-04-12 RX ORDER — LEVOTHYROXINE SODIUM 100 UG/1
100 TABLET ORAL DAILY
Qty: 90 TABLET | Refills: 3 | Status: CANCELLED | OUTPATIENT
Start: 2018-04-12

## 2018-04-12 NOTE — TELEPHONE ENCOUNTER
Message from MyChart:  Original authorizing provider: MD Ilana Hendricks would like a refill of the following medications:  levothyroxine (SYNTHROID/LEVOTHROID) 100 MCG tablet [Prema Rodgers MD]    Preferred pharmacy: [address]    Comment:

## 2018-04-18 ENCOUNTER — OFFICE VISIT (OUTPATIENT)
Dept: DERMATOLOGY | Facility: CLINIC | Age: 63
End: 2018-04-18
Payer: COMMERCIAL

## 2018-04-18 VITALS — HEART RATE: 56 BPM | OXYGEN SATURATION: 99 % | SYSTOLIC BLOOD PRESSURE: 124 MMHG | DIASTOLIC BLOOD PRESSURE: 72 MMHG

## 2018-04-18 DIAGNOSIS — D22.9 NEVUS: ICD-10-CM

## 2018-04-18 DIAGNOSIS — L57.8 SOLAR ELASTOSIS: ICD-10-CM

## 2018-04-18 DIAGNOSIS — L82.0 INFLAMED SEBORRHEIC KERATOSIS: Primary | ICD-10-CM

## 2018-04-18 DIAGNOSIS — L81.4 LENTIGINES: ICD-10-CM

## 2018-04-18 PROCEDURE — 99203 OFFICE O/P NEW LOW 30 MIN: CPT | Mod: 25 | Performed by: PHYSICIAN ASSISTANT

## 2018-04-18 PROCEDURE — 17110 DESTRUCTION B9 LES UP TO 14: CPT | Performed by: PHYSICIAN ASSISTANT

## 2018-04-18 NOTE — NURSING NOTE
"Chief Complaint   Patient presents with     Derm Problem     mole on back       Initial /72  Pulse 56  LMP  (LMP Unknown)  SpO2 99%  Breastfeeding? No Estimated body mass index is 30.54 kg/(m^2) as calculated from the following:    Height as of 3/13/18: 1.588 m (5' 2.5\").    Weight as of 3/13/18: 77 kg (169 lb 11.2 oz).  Medication Reconciliation: complete  "

## 2018-04-18 NOTE — MR AVS SNAPSHOT
After Visit Summary   4/18/2018    Ilana Shin    MRN: [id number]           Patient Information     Date Of Birth          1955        Visit Information        Provider Department      4/18/2018 2:00 PM Yulia Romero PA-C [facility]        Care Instructions    Wear a sunscreen with at least SPF 30 on your face, ears, neck and V of the chest daily. Wear sunscreen on other areas of the body if those areas are exposed to the sun throughout the day. Sunscreens can contain physical and/or chemical blockers. Physical blockers are less likely to clog pores, these include zinc oxide and titanium dioxide. Reapply every two hour and after swimming. Sunscreen examples include Neutrogena, CeraVe, Blue Lizard, Elta MD and many others.    UV radiation  UVA radiation remains constant throughout the day and throughout the year. It is a longer wavelength than UVB and therefore penetrates deeper into the skin leading to immediate and delayed tanning, photoaging, and skin cancer. 70-80% of UVA and UVB radiation occurs between the hours of 10am-2pm.  UVB radiation  UVB radiation causes the most harmful effects and is more significant during the summer months. However, snow and ice can reflect UVB radiation leading to skin damage during the winter months as well. UVB radiation is responsible for tanning, burning, inflammation, delayed erythema (pinkness), pigmentation (brown spots), and skin cancer.   Just because you do not burn or are not developing a tan does not mean that you are not damaging your skin. A 15 minute drive to and from work for 30 years an lead to chronic sun damage of the skin. It is important to wear a broad spectrum (both UVA and UVB) sunscreen EVERY day with at least 30 SPF. Apply to face, ears, neck and v of the chest as this is where most of our sun exposure is. Reapply sunscreen every two hours if you plan on being outside.   Vinod Stone. Clinical  Dermatology: A Color Guide to Diagnosis and Therapy. Elsevier, 2016.     WOUND CARE INSTRUCTIONS  FOR CRYOSURGERY        This area treated with liquid nitrogen will form a blister. You do not need to bandage the area until after the blister forms and breaks (which may be a few days).  When the blister breaks, begin daily dressing changes as follows:    1) Clean and dry the area with tap water using clean Q-tip or sterile gauze pad.    2) Apply Polysporin ointment or Bacitracin ointment over entire wound.  Do NOT use Neosporin ointment.    3) Cover the wound with a band-aid or sterile non-stick gauze pad and micropore paper tape.      REPEAT THESE INSTRUCTIONS AT LEAST ONCE A DAY UNTIL THE WOUND HAS COMPLETELY HEALED.        It is an old wives tale that a wound heals better when it is exposed to air and allowed to dry out. The wound will heal faster with a better cosmetic result if it is kept moist with ointment and covered with a bandage.  Do not let the wound dry out.      IMPORTANT INFORMATION ON REVERSE SIDE    Supplies Needed:     *Cotton tipped applicators (Q-tips)   *Polysporin ointment or Bacitracin ointment (NOT NEOSPORIN)   *Band-aids, or non stick gauze pads and micropore paper tape                PATIENT INFORMATION    During the healing process you will notice a number of changes. All wounds develop a small halo of redness surrounding the wound.  This means healing is occurring. Severe itching with extensive redness usually indicates sensitivity to the ointment or bandage tape used to dress the wound.  You should call our office if this develops.      Swelling and/or discoloration around your surgical site is common, particularly when performed around the eye.    All wounds normally drain.  The larger the wound the more drainage there will be.  After 7-10 days, you will notice the wound beginning to shrink and new skin will begin to grow.  The wound is healed when you can see skin has formed over the  entire area.  A healed wound has a healthy, shiny look to the surface and is red to dark pink in color to normalize.  Wounds may take approximately 4-6 weeks to heal.  Larger wounds may take 6-8 weeks.  After the wound is healed you may discontinue dressing changes.    You may experience a sensation of tightness as your wound heals. This is normal and will gradually subside.    Your healed wound may be sensitive to temperature changes. This sensitivity improves with time, but if you re having a lot of discomfort, try to avoid temperature extremes.    Patients frequently experience itching after their wound appears to have healed because of the continue healing under the skin.  Plain Vaseline will help relieve the itching.         Proper skin care from [facility]:    -Eliminate harsh soaps as they strip the natural oils from the skin, often resulting in dry itchy skin ( i.e. Dial, Zest, [place])  -Use mild soaps such as Cetaphil or Dove Sensitive Skin in the shower. You do not need to use soap on arms, legs, and trunk every time you shower unless visibly soiled.   -Avoid hot or cold showers.  -After showering, lightly dry off and apply moisturizing within 2-3 minutes. This will help trap moisture in the skin.   -Aggressive use of a moisturizer at least 1-2 times a day to the entire body (including -Vanicream, Cetaphil, Aquaphor or Cerave) and moisturize hands after every washing.  -We recommend using moisturizers that come in a tub that needs to be scooped out, not a pump. This has more of an oil base. It will hold moisture in your skin much better than a water base moisturizer. The above recommended are non-pore clogging.                     Follow-ups after your visit        Who to contact     If you have questions or need follow up information about today's clinic visit or your schedule please contact [facility] directly at [phone].  Normal or non-critical lab and  imaging results will be communicated to you by MyChart, letter or phone within 4 business days after the clinic has received the results. If you do not hear from us within 7 days, please contact the clinic through RECCYt or phone. If you have a critical or abnormal lab result, we will notify you by phone as soon as possible.  Submit refill requests through [employer] or call your pharmacy and they will forward the refill request to us. Please allow 3 business days for your refill to be completed.          Additional Information About Your Visit        [employer] Information     [employer] gives you secure access to your electronic health record. If you see a primary care provider, you can also send messages to your care team and make appointments. If you have questions, please call your primary care clinic.  If you do not have a primary care provider, please call [phone] and they will assist you.        Care EveryWhere ID     This is your Care EveryWhere ID. This could be used by other organizations to access your [facility] medical records  [id number]        Your Vitals Were     Pulse Last Period Pulse Oximetry Breastfeeding?          56 (LMP Unknown) 99% No         Blood Pressure from Last 3 Encounters:   04/18/18 124/72   03/13/18 130/66   02/27/18 130/72    Weight from Last 3 Encounters:   03/13/18 77 kg (169 lb 11.2 oz)   02/27/18 77.8 kg (171 lb 8 oz)   11/27/17 76.2 kg (168 lb 1.6 oz)              Today, you had the following     No orders found for display       Primary Care Provider Office Phone # Fax #    Prema Rodgers [phone]       [address] [place] [id number]        Equal Access to Services     JOSE MANUEL [facility] : Hadii julianne boone hadashjosé miguel Somarylin, waaxda luqadaha, qaybta kaalmada daniel, topher suárez. So [facility] [phone].    ATENCIÓN: Si habla español, tiene a roper disposición servicios gratuitos de asistencia lingüística. Llame al [phone].    We  comply with applicable federal civil rights laws and [place] laws. We do not discriminate on the basis of race, color, national origin, age, disability, sex, sexual orientation, or gender identity.            Thank you!     Thank you for choosing [facility]  for your care. Our goal is always to provide you with excellent care. Hearing back from our patients is one way we can continue to improve our services. Please take a few minutes to complete the written survey that you may receive in the mail after your visit with us. Thank you!             Your Updated Medication List - Protect others around you: Learn how to safely use, store and throw away your medicines at www.disposemymeds.org.          This list is accurate as of 4/18/18  2:27 PM.  Always use your most recent med list.                   Brand Name Dispense Instructions for use Diagnosis    cyanocolbalamin 500 MCG tablet    vitamin  B-12     Take 2 tablets by mouth daily Chews        DULoxetine 30 MG EC capsule    CYMBALTA    90 capsule    Take 1 capsule (30 mg) by mouth daily    Fibromyalgia       ferrous sulfate 142 (45 Fe) MG Tbcr    SLO-FE    90 tablet    Take 1 tablet (142 mg) by mouth daily    S/P gastric bypass       fluticasone 50 MCG/ACT spray    FLONASE    16 mL    USE ONE TO TWO SPRAYS IN EACH NOSTRIL EVERY EVENING    Chronic vasomotor rhinitis       ibuprofen 200 MG capsule      Take 200 mg by mouth every 6 hours as needed for fever        levothyroxine 100 MCG tablet    SYNTHROID/LEVOTHROID    90 tablet    Take 1 tablet (100 mcg) by mouth daily    Hypothyroidism, unspecified type       losartan 100 MG tablet    COZAAR    90 tablet    Take 1 tablet (100 mg) by mouth daily    Benign essential hypertension       vitamin D 2000 units Caps      Take 1 capsule by mouth daily        zolpidem 5 MG tablet    AMBIEN    30 tablet    Take 1 tablet (5 mg) by mouth nightly as needed for sleep    Insomnia, unspecified type

## 2018-04-18 NOTE — PATIENT INSTRUCTIONS
Wear a sunscreen with at least SPF 30 on your face, ears, neck and V of the chest daily. Wear sunscreen on other areas of the body if those areas are exposed to the sun throughout the day. Sunscreens can contain physical and/or chemical blockers. Physical blockers are less likely to clog pores, these include zinc oxide and titanium dioxide. Reapply every two hour and after swimming. Sunscreen examples include Neutrogena, CeraVe, Blue Lizard, Elta MD and many others.    UV radiation  UVA radiation remains constant throughout the day and throughout the year. It is a longer wavelength than UVB and therefore penetrates deeper into the skin leading to immediate and delayed tanning, photoaging, and skin cancer. 70-80% of UVA and UVB radiation occurs between the hours of 10am-2pm.  UVB radiation  UVB radiation causes the most harmful effects and is more significant during the summer months. However, snow and ice can reflect UVB radiation leading to skin damage during the winter months as well. UVB radiation is responsible for tanning, burning, inflammation, delayed erythema (pinkness), pigmentation (brown spots), and skin cancer.   Just because you do not burn or are not developing a tan does not mean that you are not damaging your skin. A 15 minute drive to and from work for 30 years an lead to chronic sun damage of the skin. It is important to wear a broad spectrum (both UVA and UVB) sunscreen EVERY day with at least 30 SPF. Apply to face, ears, neck and v of the chest as this is where most of our sun exposure is. Reapply sunscreen every two hours if you plan on being outside.   Vinod Stone. Clinical Dermatology: A Color Guide to Diagnosis and Therapy. Elsevier, 2016.     WOUND CARE INSTRUCTIONS  FOR CRYOSURGERY        This area treated with liquid nitrogen will form a blister. You do not need to bandage the area until after the blister forms and breaks (which may be a few days).  When the blister breaks, begin daily  dressing changes as follows:    1) Clean and dry the area with tap water using clean Q-tip or sterile gauze pad.    2) Apply Polysporin ointment or Bacitracin ointment over entire wound.  Do NOT use Neosporin ointment.    3) Cover the wound with a band-aid or sterile non-stick gauze pad and micropore paper tape.      REPEAT THESE INSTRUCTIONS AT LEAST ONCE A DAY UNTIL THE WOUND HAS COMPLETELY HEALED.        It is an old wives tale that a wound heals better when it is exposed to air and allowed to dry out. The wound will heal faster with a better cosmetic result if it is kept moist with ointment and covered with a bandage.  Do not let the wound dry out.      IMPORTANT INFORMATION ON REVERSE SIDE    Supplies Needed:     *Cotton tipped applicators (Q-tips)   *Polysporin ointment or Bacitracin ointment (NOT NEOSPORIN)   *Band-aids, or non stick gauze pads and micropore paper tape                PATIENT INFORMATION    During the healing process you will notice a number of changes. All wounds develop a small halo of redness surrounding the wound.  This means healing is occurring. Severe itching with extensive redness usually indicates sensitivity to the ointment or bandage tape used to dress the wound.  You should call our office if this develops.      Swelling and/or discoloration around your surgical site is common, particularly when performed around the eye.    All wounds normally drain.  The larger the wound the more drainage there will be.  After 7-10 days, you will notice the wound beginning to shrink and new skin will begin to grow.  The wound is healed when you can see skin has formed over the entire area.  A healed wound has a healthy, shiny look to the surface and is red to dark pink in color to normalize.  Wounds may take approximately 4-6 weeks to heal.  Larger wounds may take 6-8 weeks.  After the wound is healed you may discontinue dressing changes.    You may experience a sensation of tightness as your  wound heals. This is normal and will gradually subside.    Your healed wound may be sensitive to temperature changes. This sensitivity improves with time, but if you re having a lot of discomfort, try to avoid temperature extremes.    Patients frequently experience itching after their wound appears to have healed because of the continue healing under the skin.  Plain Vaseline will help relieve the itching.         Proper skin care from [facility]:    -Eliminate harsh soaps as they strip the natural oils from the skin, often resulting in dry itchy skin ( i.e. Dial, Zest, [place])  -Use mild soaps such as Cetaphil or Dove Sensitive Skin in the shower. You do not need to use soap on arms, legs, and trunk every time you shower unless visibly soiled.   -Avoid hot or cold showers.  -After showering, lightly dry off and apply moisturizing within 2-3 minutes. This will help trap moisture in the skin.   -Aggressive use of a moisturizer at least 1-2 times a day to the entire body (including -Vanicream, Cetaphil, Aquaphor or Cerave) and moisturize hands after every washing.  -We recommend using moisturizers that come in a tub that needs to be scooped out, not a pump. This has more of an oil base. It will hold moisture in your skin much better than a water base moisturizer. The above recommended are non-pore clogging.

## 2018-04-18 NOTE — LETTER
4/18/2018         RE: Ilana Shin  [address]  [facility] [address]        Dear Colleague,    Thank you for referring your patient, Ilana Shin, to the [facility]. Please see a copy of my visit note below.    HPI:  I was asked to see pt by Dr Prema Rodgers. Ilana Shin is a 62 year old year old female patient here today for new growth on groin and back   Duration: 2-3 years  Symptoms:  ithcy and growing     Previous treatments: none     Alleviating/aggravating factors: none    Associated symptoms: none  Additional findings:clothes rub on it and irritate  Patient has no other skin complaints today.  Remainder of the HPI, Meds, PMH, Allergies, FH, and SH was reviewed in chart.      Past Medical History:   Diagnosis Date     Bradycardia      Fibromyalgia      Hypothyroidism      Persistent insomnia        Past Surgical History:   Procedure Laterality Date     BUNIONECTOMY Right 8/2014     BUNIONECTOMY LADONNA, REPAIR HAMMER TOE(S), COMBINED  09/2003    Left foot     C LAPAROSCOPIC JASS EN Y GASTROJEJUNOSTOMY  2006     CHOLECYSTECTOMY, OPEN  1994     RELEASE CARPAL TUNNEL BILATERAL  1999     TUBAL LIGATION  1984    age 29        Family History   Problem Relation Age of Onset     Hypertension Mother      Type 2 Diabetes Father      Hypertension Father      Breast Cancer Paternal Grandmother      Breast Cancer Sister 45     Myocardial Infarction Sister      Lung Cancer Brother      Hypertension Sister      x2     CEREBROVASCULAR DISEASE No family hx of      Coronary Artery Disease Early Onset No family hx of      Colon Cancer No family hx of      Ovarian Cancer No family hx of        Social History     Social History     Marital status:      Spouse name: N/A     Number of children: N/A     Years of education: N/A     Occupational History           Social History Main Topics     Smoking status: Former Smoker     Packs/day: 1.00     Years: 20.00     Types:  Cigarettes     Quit date: 1/1/2002     Smokeless tobacco: Never Used     Alcohol use No     Drug use: No     Sexual activity: No     Other Topics Concern     Parent/Sibling W/ Cabg, Mi Or Angioplasty Before 65f 55m? No     Bike Helmet Yes     Seat Belt Yes     Social History Narrative    .    2 adult kids.    4 grand kids.    No formal exercise.        Outpatient Encounter Prescriptions as of 4/18/2018   Medication Sig Dispense Refill     Cholecalciferol (VITAMIN D) 2000 UNITS CAPS Take 1 capsule by mouth daily       cyanocolbalamin (VITAMIN  B-12) 500 MCG tablet Take 2 tablets by mouth daily Chews       DULoxetine (CYMBALTA) 30 MG EC capsule Take 1 capsule (30 mg) by mouth daily (Patient not taking: Reported on 4/18/2018) 90 capsule 0     ferrous sulfate (SLO-FE) 142 (45 FE) MG TBCR Take 1 tablet (142 mg) by mouth daily 90 tablet 3     fluticasone (FLONASE) 50 MCG/ACT spray USE ONE TO TWO SPRAYS IN EACH NOSTRIL EVERY EVENING 16 mL 3     ibuprofen 200 MG capsule Take 200 mg by mouth every 6 hours as needed for fever       levothyroxine (SYNTHROID/LEVOTHROID) 100 MCG tablet Take 1 tablet (100 mcg) by mouth daily 90 tablet 3     losartan (COZAAR) 100 MG tablet Take 1 tablet (100 mg) by mouth daily 90 tablet 3     zolpidem (AMBIEN) 5 MG tablet Take 1 tablet (5 mg) by mouth nightly as needed for sleep 30 tablet 0     No facility-administered encounter medications on file as of 4/18/2018.        Review Of Systems:  Skin: As above  Eyes: negative  Ears/Nose/Throat: negative  Respiratory: No shortness of breath, dyspnea on exertion, cough, or hemoptysis  Cardiovascular: negative  Gastrointestinal: negative  Genitourinary: negative  Musculoskeletal: negative  Neurologic: negative  Psychiatric: negative  Hematologic/Lymphatic/Immunologic: negative  Endocrine: negative      Objective:     /72  Pulse 56  LMP  (LMP Unknown)  SpO2 99%  Breastfeeding? No  Eyes: Conjunctivae/lids: Normal   ENT: Lips:  Normal  MSK:  Normal  Pulm: Breathing Normal  Neuro/Psych: Orientation: Normal; Mood/Affect: Normal, NAD, WDWN  Following areas examined: face, neck, back, right groin, UE  Findings:    1) Inflamed brown, stuck-on scaly appearing papules on left superior back x 2  2) Well circumscribed papule with symmetric color distribution on left forehead  3)Tan WD smooth macules on face, neck, trunk, and upper extremities.  4)Rhytides, hypo/hyperpigmentation, and atrophy of face and neck        Assessment and Plan:  1) ISK x 3  LN2: Treated with LN2 for 5s for 1-2 cycles. Warned risks of blistering, pain, pigment change, scarring, and incomplete resolution.  Advised patient to return if lesions do not completely resolve within 2-3 months.  Wound care sheet given.  2-3) Nevus, lentigines  I discussed the specifics of tumor, prognosis, and genetics of benign lesions.  I explained that treatment of these lesions would be purely cosmetic and not medically neccessary.  I discussed with patient different removal options including excision, cryotherapy, cautery and /or laser.      4) Solar elastosis  Signs and Symptoms of non-melanoma skin cancer and ABCDEs of melanoma reviewed with patient. Patient encouraged to perform monthly self skin exams. UV precautions reviewed with patient.     Follow up for FBE      Again, thank you for allowing me to participate in the care of your patient.        Sincerely,        Yulia Romero PA-C

## 2018-04-18 NOTE — PROGRESS NOTES
HPI:  I was asked to see pt by Dr Prema Rodgers. Ilana Shin is a 62 year old year old female patient here today for new growth on groin and back   Duration: 2-3 years  Symptoms:  ithcy and growing     Previous treatments: none     Alleviating/aggravating factors: none    Associated symptoms: none  Additional findings:clothes rub on it and irritate  Patient has no other skin complaints today.  Remainder of the HPI, Meds, PMH, Allergies, FH, and SH was reviewed in chart.      Past Medical History:   Diagnosis Date     Bradycardia      Fibromyalgia      Hypothyroidism      Persistent insomnia        Past Surgical History:   Procedure Laterality Date     BUNIONECTOMY Right 8/2014     BUNIONECTOMY LADONNA, REPAIR HAMMER TOE(S), COMBINED  09/2003    Left foot     C LAPAROSCOPIC JASS EN Y GASTROJEJUNOSTOMY  2006     CHOLECYSTECTOMY, OPEN  1994     RELEASE CARPAL TUNNEL BILATERAL  1999     TUBAL LIGATION  1984    age 29        Family History   Problem Relation Age of Onset     Hypertension Mother      Type 2 Diabetes Father      Hypertension Father      Breast Cancer Paternal Grandmother      Breast Cancer Sister 45     Myocardial Infarction Sister      Lung Cancer Brother      Hypertension Sister      x2     CEREBROVASCULAR DISEASE No family hx of      Coronary Artery Disease Early Onset No family hx of      Colon Cancer No family hx of      Ovarian Cancer No family hx of        Social History     Social History     Marital status:      Spouse name: N/A     Number of children: N/A     Years of education: N/A     Occupational History           Social History Main Topics     Smoking status: Former Smoker     Packs/day: 1.00     Years: 20.00     Types: Cigarettes     Quit date: 1/1/2002     Smokeless tobacco: Never Used     Alcohol use No     Drug use: No     Sexual activity: No     Other Topics Concern     Parent/Sibling W/ Cabg, Mi Or Angioplasty Before 65f 55m? No     Bike Helmet Yes     Seat Belt  Yes     Social History Narrative    .    2 adult kids.    4 grand kids.    No formal exercise.        Outpatient Encounter Prescriptions as of 4/18/2018   Medication Sig Dispense Refill     Cholecalciferol (VITAMIN D) 2000 UNITS CAPS Take 1 capsule by mouth daily       cyanocolbalamin (VITAMIN  B-12) 500 MCG tablet Take 2 tablets by mouth daily Chews       DULoxetine (CYMBALTA) 30 MG EC capsule Take 1 capsule (30 mg) by mouth daily (Patient not taking: Reported on 4/18/2018) 90 capsule 0     ferrous sulfate (SLO-FE) 142 (45 FE) MG TBCR Take 1 tablet (142 mg) by mouth daily 90 tablet 3     fluticasone (FLONASE) 50 MCG/ACT spray USE ONE TO TWO SPRAYS IN EACH NOSTRIL EVERY EVENING 16 mL 3     ibuprofen 200 MG capsule Take 200 mg by mouth every 6 hours as needed for fever       levothyroxine (SYNTHROID/LEVOTHROID) 100 MCG tablet Take 1 tablet (100 mcg) by mouth daily 90 tablet 3     losartan (COZAAR) 100 MG tablet Take 1 tablet (100 mg) by mouth daily 90 tablet 3     zolpidem (AMBIEN) 5 MG tablet Take 1 tablet (5 mg) by mouth nightly as needed for sleep 30 tablet 0     No facility-administered encounter medications on file as of 4/18/2018.        Review Of Systems:  Skin: As above  Eyes: negative  Ears/Nose/Throat: negative  Respiratory: No shortness of breath, dyspnea on exertion, cough, or hemoptysis  Cardiovascular: negative  Gastrointestinal: negative  Genitourinary: negative  Musculoskeletal: negative  Neurologic: negative  Psychiatric: negative  Hematologic/Lymphatic/Immunologic: negative  Endocrine: negative      Objective:     /72  Pulse 56  LMP  (LMP Unknown)  SpO2 99%  Breastfeeding? No  Eyes: Conjunctivae/lids: Normal   ENT: Lips:  Normal  MSK: Normal  Pulm: Breathing Normal  Neuro/Psych: Orientation: Normal; Mood/Affect: Normal, NAD, WDWN  Following areas examined: face, neck, back, right groin, UE  Findings:    1) Inflamed brown, stuck-on scaly appearing papules on left superior back x  2  2) Well circumscribed papule with symmetric color distribution on left forehead  3)Tan WD smooth macules on face, neck, trunk, and upper extremities.  4)Rhytides, hypo/hyperpigmentation, and atrophy of face and neck        Assessment and Plan:  1) ISK x 3  LN2: Treated with LN2 for 5s for 1-2 cycles. Warned risks of blistering, pain, pigment change, scarring, and incomplete resolution.  Advised patient to return if lesions do not completely resolve within 2-3 months.  Wound care sheet given.  2-3) Nevus, lentigines  I discussed the specifics of tumor, prognosis, and genetics of benign lesions.  I explained that treatment of these lesions would be purely cosmetic and not medically neccessary.  I discussed with patient different removal options including excision, cryotherapy, cautery and /or laser.      4) Solar elastosis  Signs and Symptoms of non-melanoma skin cancer and ABCDEs of melanoma reviewed with patient. Patient encouraged to perform monthly self skin exams. UV precautions reviewed with patient.     Follow up for FBE

## 2018-04-24 ENCOUNTER — OFFICE VISIT (OUTPATIENT)
Dept: INTERNAL MEDICINE | Facility: CLINIC | Age: 63
End: 2018-04-24
Payer: COMMERCIAL

## 2018-04-24 VITALS
HEART RATE: 96 BPM | DIASTOLIC BLOOD PRESSURE: 80 MMHG | RESPIRATION RATE: 21 BRPM | HEIGHT: 63 IN | TEMPERATURE: 97.8 F | SYSTOLIC BLOOD PRESSURE: 124 MMHG | BODY MASS INDEX: 29.95 KG/M2 | WEIGHT: 169 LBS

## 2018-04-24 DIAGNOSIS — M79.672 LEFT FOOT PAIN: ICD-10-CM

## 2018-04-24 DIAGNOSIS — M79.7 FIBROMYALGIA: Primary | ICD-10-CM

## 2018-04-24 PROCEDURE — 99214 OFFICE O/P EST MOD 30 MIN: CPT | Performed by: INTERNAL MEDICINE

## 2018-04-24 ASSESSMENT — PAIN SCALES - GENERAL: PAINLEVEL: MILD PAIN (2)

## 2018-04-24 NOTE — PATIENT INSTRUCTIONS
TRIAL of Prozac (fluoxetine) 20mg every morning and amitriptyline 25mg every night. Both medications can be increased as needed.     Follow-up in 1-3 months (earlier as needed).

## 2018-04-24 NOTE — MR AVS SNAPSHOT
After Visit Summary   4/24/2018    Ilana Shin    MRN: [id number]           Patient Information     Date Of Birth          1955        Visit Information        Provider Department      4/24/2018 11:00 AM Prema Rodgers MD [facility]        Today's Diagnoses     Left foot pain    -  1    Fibromyalgia          Care Instructions    TRIAL of Prozac (fluoxetine) 20mg every morning and amitriptyline 25mg every night. Both medications can be increased as needed.     Follow-up in 1-3 months (earlier as needed).           Follow-ups after your visit        Additional Services     PODIATRY/FOOT & ANKLE SURGERY REFERRAL       Your provider has referred you to: [facility]: [facility] [phone]   [email or web address]    Please be aware that coverage of these services is subject to the terms and limitations of your health insurance plan.  Call member services at your health plan with any benefit or coverage questions.      Please bring the following to your appointment:  >>   Any x-rays, CTs or MRIs which have been performed.  Contact the facility where they were done to arrange for  prior to your scheduled appointment.    >>   List of current medications   >>   This referral request   >>   Any documents/labs given to you for this referral                  Who to contact     If you have questions or need follow up information about today's clinic visit or your schedule please contact [facility] directly at [phone].  Normal or non-critical lab and imaging results will be communicated to you by MyChart, letter or phone within 4 business days after the clinic has received the results. If you do not hear from us within 7 days, please contact the clinic through MyChart or phone. If you have a critical or abnormal lab result, we will notify you by phone as soon as possible.  Submit refill  "requests through [employer] or call your pharmacy and they will forward the refill request to us. Please allow 3 business days for your refill to be completed.          Additional Information About Your Visit        [employer] Information     [employer] gives you secure access to your electronic health record. If you see a primary care provider, you can also send messages to your care team and make appointments. If you have questions, please call your primary care clinic.  If you do not have a primary care provider, please call [phone] and they will assist you.        Care EveryWhere ID     This is your Care EveryWhere ID. This could be used by other organizations to access your [facility] medical records  [id number]        Your Vitals Were     Pulse Temperature Respirations Height Last Period BMI (Body Mass Index)    96 97.8  F (36.6  C) (Oral) 21 5' 2.5\" (1.588 m) (LMP Unknown) 30.42 kg/m2       Blood Pressure from Last 3 Encounters:   04/24/18 124/80   04/18/18 124/72   03/13/18 130/66    Weight from Last 3 Encounters:   04/24/18 169 lb (76.7 kg)   03/13/18 169 lb 11.2 oz (77 kg)   02/27/18 171 lb 8 oz (77.8 kg)              We Performed the Following     PODIATRY/FOOT & ANKLE SURGERY REFERRAL          Today's Medication Changes          These changes are accurate as of 4/24/18 11:20 AM.  If you have any questions, ask your nurse or doctor.               Start taking these medicines.        Dose/Directions    amitriptyline 25 MG tablet   Commonly known as:  ELAVIL   Used for:  Fibromyalgia   Started by:  Prema Rodgers MD        Dose:  25 mg   Take 1 tablet (25 mg) by mouth At Bedtime   Quantity:  90 tablet   Refills:  0       FLUoxetine 20 MG capsule   Commonly known as:  PROzac   Used for:  Fibromyalgia   Started by:  Prema Rodgers MD        Dose:  20 mg   Take 1 capsule (20 mg) by mouth daily   Quantity:  90 capsule   Refills:  0            Where to get your medicines      These medications were sent to " [facility] [place], [place] - [address]  [address] [facility], [facility] [address]     Phone:  [phone]     amitriptyline 25 MG tablet    FLUoxetine 20 MG capsule                Primary Care Provider Office Phone # Fax #    Prema Rodgers [phone]       [address]  [facility] [address]        Equal Access to Services     LINDSEY JORGE : Hadii aad ku hadasho Soomaali, waaxda luqadaha, qaybta kaalmada adeegyada, waxay idiin hayaan adeeg kharash la'becki . So [facility] [phone].    ATENCIÓN: Si habla español, tiene a roper disposición servicios gratuitos de asistencia lingüística. Llame al [phone].    We comply with applicable federal civil rights laws and [place] laws. We do not discriminate on the basis of race, color, national origin, age, disability, sex, sexual orientation, or gender identity.            Thank you!     Thank you for choosing [facility]  for your care. Our goal is always to provide you with excellent care. Hearing back from our patients is one way we can continue to improve our services. Please take a few minutes to complete the written survey that you may receive in the mail after your visit with us. Thank you!             Your Updated Medication List - Protect others around you: Learn how to safely use, store and throw away your medicines at www.disposemymeds.org.          This list is accurate as of 4/24/18 11:20 AM.  Always use your most recent med list.                   Brand Name Dispense Instructions for use Diagnosis    amitriptyline 25 MG tablet    ELAVIL    90 tablet    Take 1 tablet (25 mg) by mouth At Bedtime    Fibromyalgia       ferrous sulfate 142 (45 Fe) MG Tbcr    SLO-FE    90 tablet    Take 1 tablet (142 mg) by mouth daily    S/P gastric bypass       FLUoxetine 20 MG capsule    PROzac    90 capsule    Take 1 capsule (20 mg) by mouth daily    Fibromyalgia       ibuprofen 200 MG capsule      Take 200 mg by  mouth every 6 hours as needed for fever        levothyroxine 100 MCG tablet    SYNTHROID/LEVOTHROID    90 tablet    Take 1 tablet (100 mcg) by mouth daily    Hypothyroidism, unspecified type       losartan 100 MG tablet    COZAAR    90 tablet    Take 1 tablet (100 mg) by mouth daily    Benign essential hypertension       vitamin D 2000 units Caps      Take 1 capsule by mouth daily        zolpidem 5 MG tablet    AMBIEN    30 tablet    Take 1 tablet (5 mg) by mouth nightly as needed for sleep    Insomnia, unspecified type

## 2018-04-24 NOTE — PROGRESS NOTES
"  SUBJECTIVE:                                                      HPI: Ilana Shin is a pleasant 62 year old female who presents for fibromyalgia follow-up:    Was started on Cymbalta 60mg daily for fibromyalgia at last visit.    Dose was lowered to 30mg daily after patient reported feeling tired and dopey.     Patient continued to feel tired and dopey on 30 mg daily, so stopped taking medication several days ago.  Fatigue and dopiness have since resolved.      Patient reports that fibromyalgia was significantly improved on Cymbalta in spite of the side effects.     Would like to try different medication if possible.    Medications significant for nightly Ambien use.    ---    Patient also complains of left foot pain:  - ongoing chronically - for years  - no precipitating trauma, injury, misstep, or unusual exertion  - indicates mid forefoot   - describes as \"glass breaking in my foot\" - i.e. sharp and terribly painful  - occurs several times a day at random    Was evaluated by Ortho walk-in clinic a couple of years ago - x-ray was normal. Patient reassured. She has not sought treatment since.    ---    The medication, allergy, and problem lists have been reviewed and updated as appropriate.       OBJECTIVE:                                                      /80 (BP Location: Left arm, Patient Position: Chair, Cuff Size: Adult Regular)  Pulse 96  Temp 97.8  F (36.6  C) (Oral)  Resp 21  Ht 5' 2.5\" (1.588 m)  Wt 169 lb (76.7 kg)  LMP  (LMP Unknown)  BMI 30.42 kg/m2  Constitutional: well-appearing  Left foot: normal appearance - no deformity, redness, swelling, or skin changes; nontender to palpation throughout   Psych: normal judgment and insight; normal mood and affect; recent and remote memory intact      ASSESSMENT/PLAN:                                                      (M79.7) Fibromyalgia  (primary encounter diagnosis)  Comment: Cymbalta helped but made her feel tired and dopey; has since " stopped with resolution of symptoms.  Plan:    - TRIAL of Prozac 20mg daily (qam).   - TRIAL of amitriptyline 25 mg nightly (caution with concurrent Ambien use).   - follow-up in ~6-8 weeks (earlier as needed).     (M79.672) Left foot pain  Comment: etiology unclear; differential includes Woodson's neuroma, metatarsalgia, and stress fracture.   Plan: referred to podiatry for further evaluation - patient to schedule.     The instructions on the AVS were discussed and explained to the patient. Patient expressed understanding of instructions.    A total of 25 minutes were spent face-to-face with this patient during this encounter and over half of that time was spent on counseling and coordination of care re: above diagnoses and plans of care.     (Chart documentation was completed, in part, with Assignment Editor voice-recognition software. Even though reviewed, some grammatical, spelling, and word errors may remain.)    Prema Rodgers MD   [address] [address]  T: [phone], F: [phone]

## 2018-05-05 DIAGNOSIS — G47.00 INSOMNIA, UNSPECIFIED TYPE: ICD-10-CM

## 2018-05-07 ENCOUNTER — OFFICE VISIT (OUTPATIENT)
Dept: PODIATRY | Facility: CLINIC | Age: 63
End: 2018-05-07
Payer: COMMERCIAL

## 2018-05-07 VITALS
HEIGHT: 63 IN | BODY MASS INDEX: 29.95 KG/M2 | WEIGHT: 169 LBS | SYSTOLIC BLOOD PRESSURE: 128 MMHG | DIASTOLIC BLOOD PRESSURE: 68 MMHG

## 2018-05-07 DIAGNOSIS — M79.672 LEFT FOOT PAIN: Primary | ICD-10-CM

## 2018-05-07 PROCEDURE — 99203 OFFICE O/P NEW LOW 30 MIN: CPT | Performed by: PODIATRIST

## 2018-05-07 NOTE — PROGRESS NOTES
ASSESSMENT/PLAN:    Encounter Diagnosis   Name Primary?     Left foot pain Yes     I discussed the relevant anatomy in her region of pain - near the cuboid. This could be many things, yet it is intermittent.  A stress fracture, tumor, cyst, advance arthritis all would be expected to cause more persistent pain.   I question cuboid syndrome/ ligament pain.     Pt is referred to the [facility] for prescription orthoses.  Over the counter options also discussed.    Trilok braced with foot strap lateral    Supportive, thick, stiffer soled athletic shoes    Avoidance of barefoot walking      Body mass index is 30.42 kg/(m^2).    Weight management plan: Patient was referred to their PCP to discuss a diet and exercise plan.      Luis A Wallace DPM, FACFAS, MS    [place] [facility] & Ankle Surgery      ____________________________________________________________________    HPI:       I was asked by Dr. Rodgers to evaluate this patient regarding right foot pain.     Chief Complaint: right foot pain  Onset of problem: 2 years  Pain/ discomfort is described as:  Deep ache  Rating:  10/10   Frequency:  Daily; intermittent - 2-3x  She is unsure of what triggers the pain  *  Patient Active Problem List   Diagnosis     Persistent insomnia     Hypothyroidism     Fibromyalgia     Bradycardia   *  *  Past Surgical History:   Procedure Laterality Date     BUNIONECTOMY Right 8/2014     BUNIONECTOMY LADONNA, REPAIR HAMMER TOE(S), COMBINED  09/2003    Left foot     C LAPAROSCOPIC JASS EN Y GASTROJEJUNOSTOMY  2006     CHOLECYSTECTOMY, OPEN  1994     RELEASE CARPAL TUNNEL BILATERAL  1999     TUBAL LIGATION  1984    age 29   *  *  Current Outpatient Prescriptions   Medication Sig Dispense Refill     amitriptyline (ELAVIL) 25 MG tablet Take 1 tablet (25 mg) by mouth At Bedtime 90 tablet 0     Cholecalciferol (VITAMIN D) 2000 UNITS CAPS Take 1 capsule by mouth daily       ferrous sulfate (SLO-FE) 142  (45 FE) MG TBCR Take 1 tablet (142 mg) by mouth daily 90 tablet 3     FLUoxetine (PROZAC) 20 MG capsule Take 1 capsule (20 mg) by mouth daily 90 capsule 0     ibuprofen 200 MG capsule Take 200 mg by mouth every 6 hours as needed for fever       levothyroxine (SYNTHROID/LEVOTHROID) 100 MCG tablet Take 1 tablet (100 mcg) by mouth daily 90 tablet 3     losartan (COZAAR) 100 MG tablet Take 1 tablet (100 mg) by mouth daily 90 tablet 3     zolpidem (AMBIEN) 5 MG tablet Take 1 tablet (5 mg) by mouth nightly as needed for sleep 30 tablet 0       ROS:     A 10-point review of systems was performed and is positive for that noted in the HPI and as seen below.  All other areas are negative.     Numbness in feet?  no   Calf pain with walking? no  Recent foot/ankle injury? no  Weight change over past 12 months? no  Self perception as overweight? yes  Recent flu-like symptoms? no  Joint pain other than feet ? Shoulders, hips    Social History: Employment:  yes;  Exercise/Physical activity:  Walking 6x/ week;  Tobacco use:  no  Social History     Social History     Marital status:      Spouse name: N/A     Number of children: N/A     Years of education: N/A     Occupational History           Social History Main Topics     Smoking status: Former Smoker     Packs/day: 1.00     Years: 20.00     Types: Cigarettes     Quit date: 1/1/2002     Smokeless tobacco: Never Used     Alcohol use No     Drug use: No     Sexual activity: No     Other Topics Concern     Parent/Sibling W/ Cabg, Mi Or Angioplasty Before 65f 55m? No     Bike Helmet Yes     Seat Belt Yes     Social History Narrative    .    2 adult kids.    4 grand kids.    No formal exercise.        Family history:  Family History   Problem Relation Age of Onset     Hypertension Mother      Type 2 Diabetes Father      Hypertension Father      Breast Cancer Paternal Grandmother      Breast Cancer Sister 45     Myocardial Infarction Sister      Lung Cancer  "Brother      Hypertension Sister      x2     CEREBROVASCULAR DISEASE No family hx of      Coronary Artery Disease Early Onset No family hx of      Colon Cancer No family hx of      Ovarian Cancer No family hx of        Rheumatoid arthritis:  sibling  Foot Problems: no  Diabetes: no      EXAM:    Vitals: /68 (BP Location: Right arm, Patient Position: Chair, Cuff Size: Adult Regular)  Ht 5' 2.5\" (1.588 m)  Wt 169 lb (76.7 kg)  LMP  (LMP Unknown)  BMI 30.42 kg/m2  BMI: Body mass index is 30.42 kg/(m^2).  Height: 5' 2.5\"    Constitutional/ general:  Pt is in no apparent distress, appears well-nourished.  Cooperative with history and physical exam.     Vascular:  Pedal pulses are palpable bilaterally for both the DP and PT arteries.  CFT < 3 sec.  No edema.  Pedal hair growth noted.     Neuro:  Alert and oriented x 3. Coordinated gait.  Light touch sensation is intact to the L4, L5, S1 distributions. No obvious deficits.  No evidence of neurological-based weakness, spasticity, or contracture in the lower extremities.     Derm: Normal texture and turgor.  No erythema, ecchymosis, or cyanosis.  No open lesions.     Musculoskeletal:    Lower extremity muscle strength is normal.  Patient is ambulatory without an assistive device or brace .  No gross deformities.  She has a normal arch and clinically appears to have a metatarsus adductus foot structure.  Pain on palpation: no        Luis A Wallace DPM, ESTIVEN, MS    [facility] of Podiatry/Foot & Ankle Surgery              "

## 2018-05-07 NOTE — PATIENT INSTRUCTIONS
Thank you for choosing [facility]!    DR. ESCALONA'S CLINIC LOCATIONS     MONDAY - [place] WEDNESDAY - [place]   [address] [place] [address] [place] [address]   [phone] / [phone] / [phone]       THURSDAY - [place] SCHEDULE SURGERY: [phone]   [address] APPOINTMENTS: [phone]   [place] [address] BILLING QUESTIONS: [phone] / [phone]       [facility] LOCATIONS  [address]  [phone]   [address]  [phone] [place]  [address] [place] [address]  [phone]   [place]  [address] [facility] [address]  [phone]   [place]  [address]  [phone] [place]  [address] [place] [address] [place]  [phone]   [place]  [address]  [phone]   [place]  [address]  [phone] [place]  [address] [phone]         [facility] LOCATIONS  [facility]  [address] [facility] [address] [facility] [facility] [address]  [place] [address]  Phone: [phone]  Fax: [phone] [facility]  [address]  [place] [address]  Phone: [phone]   Fax: [phone]   [facility]  [address]  [place] [address]  Phone: [phone]  Fax: [phone] [facility]  [address]  [place] [address]  Phone: [phone]   Fax: [phone]   [facility]   [address]  [place] [address]  Phone: [phone]   Fax: [phone] * Please call any location listed to make an appointment for a casting/fitting. Your referral was sent to their central office and they will all have the order on file.     Look into Cuboid Syndrome at home. Unfortunately we are unable to print any information for you.       BODY MASS INDEX (BMI)  Many things can cause foot and ankle problems. Foot structure,  activity level, foot mechanics and injuries are common causes of pain.  One very important issue that often goes unmentioned, is body weight.  Extra weight can cause increased stress on muscles, ligaments, bones and tendons.  Sometimes just a few extra pounds is all it takes to put one over her/his threshold. Without reducing that stress, it can be difficult to alleviate pain. Some people are uncomfortable addressing this issue, but we feel it is important for you to think about it. As Foot &  Ankle specialists, our job is addressing the lower extremity problem and possible causes. Regarding extra body weight, we encourage patients to discuss diet and weight management plans with their primary care doctors. It is this team approach that gives you the best opportunity for pain relief and getting you back on your feet.

## 2018-05-07 NOTE — MR AVS SNAPSHOT
After Visit Summary   5/7/2018    Ilana Shin    MRN: [id number]           Patient Information     Date Of Birth          1955        Visit Information        Provider Department      5/7/2018 7:00 AM Luis A Escalona DPM [facility]        Care Instructions    Thank you for choosing [facility]!    DR. ESCALONA'S CLINIC LOCATIONS     MONDAY - [place] WEDNESDAY - [place]   [address] [place] [address] [place] [address]   [phone] / [phone] / [phone]       THURSDAY - [place] SCHEDULE SURGERY: [phone]   [address] APPOINTMENTS: [phone]   [place] [address] BILLING QUESTIONS: [phone] / [phone]       [facility] LOCATIONS  [place]  [address] [place] [address]  [phone]   [address]  [phone] [place]  [address]  [phone]   [place]  [address]  [phone]   [place]  [address]  [phone] [place]  [address]  [phone]   [place]  [address]  [phone]   [place]  [address] [place] [address]  [phone] [place]  [address]  [phone]         [place] [facility] LOCATIONS  [facility]  [address]  [place] [address]  Phone: [phone]  Fax: [phone] [facility]  [address]  [place] [address]  Phone: [phone]   Fax: [phone]   [facility]  [address]  [place] [address]  Phone: [phone]  Fax: [phone] [facility]  [address]  [place] [address]  Phone: [phone]   Fax: [phone]   [facility]   [address]  [place] [address]  Phone: [phone]   Fax: [phone] * Please call any location listed to make an  appointment for a casting/fitting. Your referral was sent to their central office and they will all have the order on file.     Look into Cuboid Syndrome at home. Unfortunately we are unable to print any information for you.       BODY MASS INDEX (BMI)  Many things can cause foot and ankle problems. Foot structure, activity level, foot mechanics and injuries are common causes of pain.  One very important issue that often goes unmentioned, is body weight.  Extra weight can cause increased stress on muscles, ligaments, bones and tendons.  Sometimes just a few extra pounds is all it takes to put one over her/his threshold. Without reducing that stress, it can be difficult to alleviate pain. Some people are uncomfortable addressing this issue, but we feel it is important for you to think about it. As Foot &  Ankle specialists, our job is addressing the lower extremity problem and possible causes. Regarding extra body weight, we encourage patients to discuss diet and weight management plans with their primary care doctors. It is this team approach that gives you the best opportunity for pain relief and getting you back on your feet.                Follow-ups after your visit        Who to contact     If you have questions or need follow up information about today's clinic visit or your schedule please contact [facility] directly at [phone].  Normal or non-critical lab and imaging results will be communicated to you by MyChart, letter or phone within 4 business days after the clinic has received the results. If you do not hear from us within 7 days, please contact the clinic through MyChart or phone. If you have a critical or abnormal lab result, we will notify you by phone as soon as possible.  Submit refill requests through [employer] or call your pharmacy and they will forward the refill request to us. Please allow 3 business days for your refill to be completed.          Additional  "Information About Your Visit        MyChart Information     [employer] gives you secure access to your electronic health record. If you see a primary care provider, you can also send messages to your care team and make appointments. If you have questions, please call your primary care clinic.  If you do not have a primary care provider, please call [phone] and they will assist you.        Care EveryWhere ID     This is your Care EveryWhere ID. This could be used by other organizations to access your [place] medical records  [id number]        Your Vitals Were     Height Last Period BMI (Body Mass Index)             5' 2.5\" (1.588 m) (LMP Unknown) 30.42 kg/m2          Blood Pressure from Last 3 Encounters:   05/07/18 128/68   04/24/18 124/80   04/18/18 124/72    Weight from Last 3 Encounters:   05/07/18 169 lb (76.7 kg)   04/24/18 169 lb (76.7 kg)   03/13/18 169 lb 11.2 oz (77 kg)              Today, you had the following     No orders found for display       Primary Care Provider Office Phone # Fax #    Prema Rodgers [phone]       [address] [facility] [address]        Equal Access to Services     LINDSEY JORGE AH: Hadii aad ku hadasho Soomaali, waaxda luqadaha, qaybta kaalmada adeegyada, waxay radhain haykarmenn katrin ross lasydnie ah. So [facility] [phone].    ATENCIÓN: Si habla español, tiene a roper disposición servicios gratuitos de asistencia lingüística. Llame al [phone].    We comply with applicable federal civil rights laws and [place] laws. We do not discriminate on the basis of race, color, national origin, age, disability, sex, sexual orientation, or gender identity.            Thank you!     Thank you for choosing [facility]  for your care. Our goal is always to provide you with excellent care. Hearing back from our patients is one way we can continue to improve our services. Please take a few minutes to complete the written survey that you may receive " in the mail after your visit with us. Thank you!             Your Updated Medication List - Protect others around you: Learn how to safely use, store and throw away your medicines at www.disposemymeds.org.          This list is accurate as of 5/7/18  7:16 AM.  Always use your most recent med list.                   Brand Name Dispense Instructions for use Diagnosis    amitriptyline 25 MG tablet    ELAVIL    90 tablet    Take 1 tablet (25 mg) by mouth At Bedtime    Fibromyalgia       ferrous sulfate 142 (45 Fe) MG Tbcr    SLO-FE    90 tablet    Take 1 tablet (142 mg) by mouth daily    S/P gastric bypass       FLUoxetine 20 MG capsule    PROzac    90 capsule    Take 1 capsule (20 mg) by mouth daily    Fibromyalgia       ibuprofen 200 MG capsule      Take 200 mg by mouth every 6 hours as needed for fever        levothyroxine 100 MCG tablet    SYNTHROID/LEVOTHROID    90 tablet    Take 1 tablet (100 mcg) by mouth daily    Hypothyroidism, unspecified type       losartan 100 MG tablet    COZAAR    90 tablet    Take 1 tablet (100 mg) by mouth daily    Benign essential hypertension       vitamin D 2000 units Caps      Take 1 capsule by mouth daily        zolpidem 5 MG tablet    AMBIEN    30 tablet    Take 1 tablet (5 mg) by mouth nightly as needed for sleep    Insomnia, unspecified type

## 2018-05-07 NOTE — LETTER
5/7/2018         RE: Ilana Shin  [address]  [facility] [address]        Dear Colleague,    Thank you for referring your patient, Ilana Shin, to the [facility]. Please see a copy of my visit note below.      ASSESSMENT/PLAN:    Encounter Diagnosis   Name Primary?     Left foot pain Yes     I discussed the relevant anatomy in her region of pain - near the cuboid. This could be many things, yet it is intermittent.  A stress fracture, tumor, cyst, advance arthritis all would be expected to cause more persistent pain.   I question cuboid syndrome/ ligament pain.     Pt is referred to the [facility] for prescription orthoses.  Over the counter options also discussed.    Trilok braced with foot strap lateral    Supportive, thick, stiffer soled athletic shoes    Avoidance of barefoot walking      Body mass index is 30.42 kg/(m^2).    Weight management plan: Patient was referred to their PCP to discuss a diet and exercise plan.      Luis A Wallace, FÉLIX, FACFAS, MS    [place]/Foot & Ankle Surgery      ____________________________________________________________________    HPI:       I was asked by Dr. Rodgers to evaluate this patient regarding right foot pain.     Chief Complaint: right foot pain  Onset of problem: 2 years  Pain/ discomfort is described as:  Deep ache  Rating:  10/10   Frequency:  Daily; intermittent - 2-3x  She is unsure of what triggers the pain  *  Patient Active Problem List   Diagnosis     Persistent insomnia     Hypothyroidism     Fibromyalgia     Bradycardia   *  *  Past Surgical History:   Procedure Laterality Date     BUNIONECTOMY Right 8/2014     BUNIONECTOMY LADONNA, REPAIR HAMMER TOE(S), COMBINED  09/2003    Left foot     C LAPAROSCOPIC JASS EN Y GASTROJEJUNOSTOMY  2006     CHOLECYSTECTOMY, OPEN  1994     RELEASE CARPAL TUNNEL BILATERAL  1999     TUBAL LIGATION  1984    age 29   *  *  Current Outpatient  Prescriptions   Medication Sig Dispense Refill     amitriptyline (ELAVIL) 25 MG tablet Take 1 tablet (25 mg) by mouth At Bedtime 90 tablet 0     Cholecalciferol (VITAMIN D) 2000 UNITS CAPS Take 1 capsule by mouth daily       ferrous sulfate (SLO-FE) 142 (45 FE) MG TBCR Take 1 tablet (142 mg) by mouth daily 90 tablet 3     FLUoxetine (PROZAC) 20 MG capsule Take 1 capsule (20 mg) by mouth daily 90 capsule 0     ibuprofen 200 MG capsule Take 200 mg by mouth every 6 hours as needed for fever       levothyroxine (SYNTHROID/LEVOTHROID) 100 MCG tablet Take 1 tablet (100 mcg) by mouth daily 90 tablet 3     losartan (COZAAR) 100 MG tablet Take 1 tablet (100 mg) by mouth daily 90 tablet 3     zolpidem (AMBIEN) 5 MG tablet Take 1 tablet (5 mg) by mouth nightly as needed for sleep 30 tablet 0       ROS:     A 10-point review of systems was performed and is positive for that noted in the HPI and as seen below.  All other areas are negative.     Numbness in feet?  no   Calf pain with walking? no  Recent foot/ankle injury? no  Weight change over past 12 months? no  Self perception as overweight? yes  Recent flu-like symptoms? no  Joint pain other than feet ? Shoulders, hips    Social History: Employment:  yes;  Exercise/Physical activity:  Walking 6x/ week;  Tobacco use:  no  Social History     Social History     Marital status:      Spouse name: N/A     Number of children: N/A     Years of education: N/A     Occupational History           Social History Main Topics     Smoking status: Former Smoker     Packs/day: 1.00     Years: 20.00     Types: Cigarettes     Quit date: 1/1/2002     Smokeless tobacco: Never Used     Alcohol use No     Drug use: No     Sexual activity: No     Other Topics Concern     Parent/Sibling W/ Cabg, Mi Or Angioplasty Before 65f 55m? No     Bike Helmet Yes     Seat Belt Yes     Social History Narrative    .    2 adult kids.    4 grand kids.    No formal exercise.        Family  "history:  Family History   Problem Relation Age of Onset     Hypertension Mother      Type 2 Diabetes Father      Hypertension Father      Breast Cancer Paternal Grandmother      Breast Cancer Sister 45     Myocardial Infarction Sister      Lung Cancer Brother      Hypertension Sister      x2     CEREBROVASCULAR DISEASE No family hx of      Coronary Artery Disease Early Onset No family hx of      Colon Cancer No family hx of      Ovarian Cancer No family hx of        Rheumatoid arthritis:  sibling  Foot Problems: no  Diabetes: no      EXAM:    Vitals: /68 (BP Location: Right arm, Patient Position: Chair, Cuff Size: Adult Regular)  Ht 5' 2.5\" (1.588 m)  Wt 169 lb (76.7 kg)  LMP  (LMP Unknown)  BMI 30.42 kg/m2  BMI: Body mass index is 30.42 kg/(m^2).  Height: 5' 2.5\"    Constitutional/ general:  Pt is in no apparent distress, appears well-nourished.  Cooperative with history and physical exam.     Vascular:  Pedal pulses are palpable bilaterally for both the DP and PT arteries.  CFT < 3 sec.  No edema.  Pedal hair growth noted.     Neuro:  Alert and oriented x 3. Coordinated gait.  Light touch sensation is intact to the L4, L5, S1 distributions. No obvious deficits.  No evidence of neurological-based weakness, spasticity, or contracture in the lower extremities.     Derm: Normal texture and turgor.  No erythema, ecchymosis, or cyanosis.  No open lesions.     Musculoskeletal:    Lower extremity muscle strength is normal.  Patient is ambulatory without an assistive device or brace .  No gross deformities.  She has a normal arch and clinically appears to have a metatarsus adductus foot structure.  Pain on palpation: no        Luis A Wallace DPM, FACFAS, MS    [place] [facility] & Ankle Surgery                Again, thank you for allowing me to participate in the care of your patient.        Sincerely,        Luis A Wallace DPM    "

## 2018-05-08 ENCOUNTER — MYC MEDICAL ADVICE (OUTPATIENT)
Dept: INTERNAL MEDICINE | Facility: CLINIC | Age: 63
End: 2018-05-08

## 2018-05-08 RX ORDER — ZOLPIDEM TARTRATE 5 MG/1
5 TABLET ORAL
Qty: 30 TABLET | Refills: 0 | Status: SHIPPED | OUTPATIENT
Start: 2018-05-08 | End: 2018-06-04

## 2018-05-08 NOTE — TELEPHONE ENCOUNTER
ambien      Last Written Prescription Date:  4/4/18  Last Fill Quantity: 30,   # refills: 0  Last Office Visit: 4/24/18  Future Office visit:       Routing refill request to provider for review/approval because:  Drug not on the [facility], P or [facility] refill protocol or controlled substance

## 2018-06-04 DIAGNOSIS — G47.00 INSOMNIA, UNSPECIFIED TYPE: ICD-10-CM

## 2018-06-05 ENCOUNTER — TELEPHONE (OUTPATIENT)
Dept: INTERNAL MEDICINE | Facility: CLINIC | Age: 63
End: 2018-06-05

## 2018-06-05 RX ORDER — ZOLPIDEM TARTRATE 5 MG/1
5 TABLET ORAL
Qty: 30 TABLET | Refills: 0 | Status: SHIPPED | OUTPATIENT
Start: 2018-06-05 | End: 2018-07-04

## 2018-06-05 NOTE — TELEPHONE ENCOUNTER
ambien      Last Written Prescription Date:  5/8/18  Last Fill Quantity: 30,   # refills: 0  Last Office Visit: 4/24/18  Future Office visit:       Routing refill request to provider for review/approval because:  Drug not on the [facility], P or [facility] refill protocol or controlled substance

## 2018-07-04 DIAGNOSIS — G47.00 INSOMNIA, UNSPECIFIED TYPE: ICD-10-CM

## 2018-07-05 RX ORDER — ZOLPIDEM TARTRATE 5 MG/1
5 TABLET ORAL
Qty: 30 TABLET | Refills: 0 | Status: SHIPPED | OUTPATIENT
Start: 2018-07-05 | End: 2018-07-20

## 2018-07-05 NOTE — TELEPHONE ENCOUNTER
ambien      Last Written Prescription Date:  6/5/18  Last Fill Quantity: 30,   # refills: 0  Last Office Visit: 4/24/18  Future Office visit:       Routing refill request to provider for review/approval because:  Drug not on the [facility], P or [facility] refill protocol or controlled substance

## 2018-07-20 ENCOUNTER — MYC MEDICAL ADVICE (OUTPATIENT)
Dept: INTERNAL MEDICINE | Facility: CLINIC | Age: 63
End: 2018-07-20

## 2018-07-20 DIAGNOSIS — E03.9 HYPOTHYROIDISM, UNSPECIFIED TYPE: ICD-10-CM

## 2018-07-20 DIAGNOSIS — G47.00 INSOMNIA, UNSPECIFIED TYPE: ICD-10-CM

## 2018-07-20 DIAGNOSIS — M79.7 FIBROMYALGIA: ICD-10-CM

## 2018-07-20 RX ORDER — ZOLPIDEM TARTRATE 5 MG/1
TABLET ORAL
Qty: 30 TABLET | Refills: 0 | Status: SHIPPED | OUTPATIENT
Start: 2018-07-20 | End: 2018-08-22

## 2018-07-20 RX ORDER — LEVOTHYROXINE SODIUM 100 UG/1
TABLET ORAL
Qty: 90 TABLET | Refills: 1 | Status: SHIPPED | OUTPATIENT
Start: 2018-07-20 | End: 2018-11-14

## 2018-07-20 NOTE — TELEPHONE ENCOUNTER
"Requested Prescriptions   Pending Prescriptions Disp Refills     zolpidem (AMBIEN) 5 MG tablet [Pharmacy Med Name: ZOLPIDEM 5MG TABLETS] 30 tablet 0    Last Written Prescription Date:  7/5/2018  Last Fill Quantity: 30,  # refills: 0   Last office visit: 4/24/2018 with prescribing provider:  4/24/2018   Future Office Visit:     Sig: TAKE 1 TABLET BY MOUTH EVERY NIGHT AT BEDTIME    There is no refill protocol information for this order        levothyroxine (SYNTHROID/LEVOTHROID) 100 MCG tablet [Pharmacy Med Name: LEVOTHYROXINE 0.100MG (100MCG) TAB] 90 tablet 0    Last Written Prescription Date:  10/12/2017  Last Fill Quantity: 90,  # refills: 3   Last office visit: 4/24/2018 with prescribing provider:  4/24/2018   Future Office Visit:     Sig: TAKE 1 TABLET BY MOUTH EVERY DAY    Thyroid Protocol Passed    7/20/2018 12:35 PM       Passed - Patient is 12 years or older       Passed - Recent (12 mo) or future (30 days) visit within the authorizing provider's specialty    Patient had office visit in the last 12 months or has a visit in the next 30 days with authorizing provider or within the authorizing provider's specialty.  See \"Patient Info\" tab in inbasket, or \"Choose Columns\" in Meds & Orders section of the refill encounter.           Passed - Normal TSH on file in past 12 months    Recent Labs   Lab Test  02/27/18   1013   TSH  1.83             Passed - No active pregnancy on record    If patient is pregnant or has had a positive pregnancy test, please check TSH.         Passed - No positive pregnancy test in past 12 months    If patient is pregnant or has had a positive pregnancy test, please check TSH.          FLUoxetine (PROZAC) 20 MG capsule [Pharmacy Med Name: FLUOXETINE 20MG CAPSULES] 90 capsule 0    Last Written Prescription Date:  4/24/2018  Last Fill Quantity: 90,  # refills: 0   Last office visit: 4/24/2018 with prescribing provider:  4/24/2018   Future Office Visit:     Sig: TAKE 1 CAPSULE BY MOUTH EVERY " "DAY    SSRIs Protocol Passed    7/20/2018 12:35 PM       Passed - Recent (12 mo) or future (30 days) visit within the authorizing provider's specialty    Patient had office visit in the last 12 months or has a visit in the next 30 days with authorizing provider or within the authorizing provider's specialty.  See \"Patient Info\" tab in inbasket, or \"Choose Columns\" in Meds & Orders section of the refill encounter.           Passed - Patient is age 18 or older       Passed - No active pregnancy on record       Passed - No positive pregnancy test in last 12 months          "

## 2018-08-22 DIAGNOSIS — G47.00 INSOMNIA, UNSPECIFIED TYPE: ICD-10-CM

## 2018-08-22 RX ORDER — ZOLPIDEM TARTRATE 5 MG/1
TABLET ORAL
Qty: 30 TABLET | Refills: 0 | Status: SHIPPED | OUTPATIENT
Start: 2018-08-22 | End: 2018-09-18

## 2018-08-22 NOTE — TELEPHONE ENCOUNTER
Requested Prescriptions   Pending Prescriptions Disp Refills     zolpidem (AMBIEN) 5 MG tablet [Pharmacy Med Name: ZOLPIDEM 5MG TABLETS] 30 tablet 0     Sig: TAKE 1 TABLET BY MOUTH EVERY NIGHT AT BEDTIME    There is no refill protocol information for this order        Last Written Prescription Date:  07/20/2018  Last Fill Quantity: 30,  # refills: 0   Last office visit: 4/24/2018 with prescribing provider: Dr. Rodgers  Future Office Visit:

## 2018-09-18 DIAGNOSIS — G47.00 INSOMNIA, UNSPECIFIED TYPE: ICD-10-CM

## 2018-09-19 RX ORDER — ZOLPIDEM TARTRATE 5 MG/1
TABLET ORAL
Qty: 30 TABLET | Refills: 0 | Status: SHIPPED | OUTPATIENT
Start: 2018-09-19 | End: 2018-10-14

## 2018-09-19 NOTE — TELEPHONE ENCOUNTER
ambien      Last Written Prescription Date:  8/22/18  Last Fill Quantity: 30,   # refills: 0  Last Office Visit: 4/24/18  Future Office visit:       Routing refill request to provider for review/approval because:  Drug not on the [facility], P or [facility] refill protocol or controlled substance

## 2018-10-14 DIAGNOSIS — G47.00 INSOMNIA, UNSPECIFIED TYPE: ICD-10-CM

## 2018-10-15 RX ORDER — ZOLPIDEM TARTRATE 5 MG/1
TABLET ORAL
Qty: 30 TABLET | Refills: 0 | Status: SHIPPED | OUTPATIENT
Start: 2018-10-15 | End: 2018-11-14

## 2018-10-15 NOTE — TELEPHONE ENCOUNTER
zolpidem (AMBIEN) 5 MG tablet    Last Written Prescription Date:  09/19/2018  Last Fill Quantity: 30,   # refills: 0  Last Office Visit: 04/24/2018  Future Office visit:       Routing refill request to provider for review/approval because:  Drug not on the [facility], [facility] or [facility] refill protocol or controlled substance

## 2018-11-14 ENCOUNTER — MYC MEDICAL ADVICE (OUTPATIENT)
Dept: INTERNAL MEDICINE | Facility: CLINIC | Age: 63
End: 2018-11-14

## 2018-11-14 DIAGNOSIS — G47.00 INSOMNIA, UNSPECIFIED TYPE: ICD-10-CM

## 2018-11-14 DIAGNOSIS — E03.9 HYPOTHYROIDISM, UNSPECIFIED TYPE: ICD-10-CM

## 2018-11-14 RX ORDER — LEVOTHYROXINE SODIUM 100 UG/1
100 TABLET ORAL DAILY
Qty: 90 TABLET | Refills: 0 | Status: SHIPPED | OUTPATIENT
Start: 2018-11-14 | End: 2019-03-10

## 2018-11-14 RX ORDER — ZOLPIDEM TARTRATE 5 MG/1
TABLET ORAL
Qty: 30 TABLET | Refills: 0 | Status: SHIPPED | OUTPATIENT
Start: 2018-11-14 | End: 2018-12-14

## 2018-11-14 NOTE — TELEPHONE ENCOUNTER
"Requested Prescriptions   Pending Prescriptions Disp Refills     zolpidem (AMBIEN) 5 MG tablet 30 tablet 0    There is no refill protocol information for this order        levothyroxine (SYNTHROID/LEVOTHROID) 100 MCG tablet 90 tablet 1     Sig: Take 1 tablet (100 mcg) by mouth daily    Thyroid Protocol Passed    11/14/2018 11:26 AM       Passed - Patient is 12 years or older       Passed - Recent (12 mo) or future (30 days) visit within the authorizing provider's specialty    Patient had office visit in the last 12 months or has a visit in the next 30 days with authorizing provider or within the authorizing provider's specialty.  See \"Patient Info\" tab in inbasket, or \"Choose Columns\" in Meds & Orders section of the refill encounter.             Passed - Normal TSH on file in past 12 months    Recent Labs   Lab Test  02/27/18   1013   TSH  1.83             Passed - No active pregnancy on record    If patient is pregnant or has had a positive pregnancy test, please check TSH.         Passed - No positive pregnancy test in past 12 months    If patient is pregnant or has had a positive pregnancy test, please check TSH.          Controlled Substance Refill Request for zolpidem (AMBIEN) 5 MG tablet    Problem List Complete:  Yes    Last Written Prescription Date:  10/15/18  Last Fill Quantity: 30,   # refills: 0    [facility] Office Visit with [facility] care provider: 4/24/18    Clinic visit frequency required: Q 3 months     Future Office visit:     Controlled substance agreement on file: No.     Processing:  Staff will hand deliver Rx to on-site pharmacy   checked in past 3 months?  No, route to RN     levothyroxine (SYNTHROID/LEVOTHROID) 100 MCG tablet    Last Written Prescription Date:  7/20/18  Last Fill Quantity: 90,  # refills: 1   Last office visit: 4/24/2018 with prescribing provider:  4/24/18   Future Office Visit:      I answered these questions as best I can. Ambien was last filled at [address]"

## 2018-11-16 DIAGNOSIS — I10 BENIGN ESSENTIAL HYPERTENSION: ICD-10-CM

## 2018-11-16 DIAGNOSIS — Z98.84 S/P GASTRIC BYPASS: ICD-10-CM

## 2018-11-16 RX ORDER — LOSARTAN POTASSIUM 100 MG/1
100 TABLET ORAL DAILY
Qty: 90 TABLET | Refills: 0 | Status: SHIPPED | OUTPATIENT
Start: 2018-11-16 | End: 2019-03-10

## 2018-11-16 NOTE — TELEPHONE ENCOUNTER
Medication is being filled for 1 time refill only due to:  Patient needs to be seen because due for an annual physical.     Prescription approved per [facility] Refill Protocol.    Monisha WYLIE RN, BSN, PHN

## 2018-11-16 NOTE — TELEPHONE ENCOUNTER
Requested Prescriptions   Pending Prescriptions Disp Refills     ferrous sulfate (SLO-FE) 142 (45 Fe) MG TBCR 90 tablet 3     Sig: Take 1 tablet (142 mg) by mouth daily    There is no refill protocol information for this order        losartan (COZAAR) 100 MG tablet 90 tablet 3     Sig: Take 1 tablet (100 mg) by mouth daily    There is no refill protocol information for this order        ferrous sulfate (SLO-FE) 142 (45 FE) MG TBCR  Last Written Prescription Date:  2/27/18  Last Fill Quantity: 90,  # refills: 3   Last office visit: 4/24/2018 with prescribing provider:  4/24/18   Future Office Visit:      losartan (COZAAR) 100 MG tablet  Last Written Prescription Date:  2/27/18  Last Fill Quantity: 90,  # refills: 3   Last office visit: 4/24/2018 with prescribing provider:  4/24/18   Future Office Visit:

## 2018-11-16 NOTE — TELEPHONE ENCOUNTER
"Requested Prescriptions   Pending Prescriptions Disp Refills     ferrous sulfate (SLO-FE) 142 (45 Fe) MG TBCR 90 tablet 3     Sig: Take 1 tablet (142 mg) by mouth daily    Iron Supplements Passed    11/16/2018  4:27 PM       Passed - Patient is 12 years of age or older       Passed - Recent (12 mo) or future (30 days) visit within the authorizing provider's specialty    Patient had office visit in the last 12 months or has a visit in the next 30 days with authorizing provider or within the authorizing provider's specialty.  See \"Patient Info\" tab in inbasket, or \"Choose Columns\" in Meds & Orders section of the refill encounter.             Passed - Hgb OR Hct on record within the past 12 mos.    Patient need only have had a HGB or HCT on file in the past 12 mos. That result does not need to be normal.    Recent Labs   Lab Test  02/27/18   1013  09/27/17   0840  06/20/16   1010   HGB  12.7  10.6*  12.3     Recent Labs   Lab Test  02/27/18   1013  09/27/17   0840  06/20/16   1010   HCT  40.1  35.0  37.7       Please verify a HGB or HCT has been checked SINCE THE LAST DOSE CHANGE.            losartan (COZAAR) 100 MG tablet 90 tablet 3     Sig: Take 1 tablet (100 mg) by mouth daily    Angiotensin-II Receptors Passed    11/16/2018  4:27 PM       Passed - Blood pressure under 140/90 in past 12 months    BP Readings from Last 3 Encounters:   05/07/18 128/68   04/24/18 124/80   04/18/18 124/72                Passed - Recent (12 mo) or future (30 days) visit within the authorizing provider's specialty    Patient had office visit in the last 12 months or has a visit in the next 30 days with authorizing provider or within the authorizing provider's specialty.  See \"Patient Info\" tab in inbasket, or \"Choose Columns\" in Meds & Orders section of the refill encounter.             Passed - Patient is age 18 or older       Passed - No active pregnancy on record       Passed - Normal serum creatinine on file in past 12 months    " Recent Labs   Lab Test  02/27/18   1013   CR  0.94            Passed - Normal serum potassium on file in past 12 months    Recent Labs   Lab Test  02/27/18   1013   POTASSIUM  4.3                   Passed - No positive pregnancy test in past 12 months

## 2018-12-14 DIAGNOSIS — G47.00 INSOMNIA, UNSPECIFIED TYPE: ICD-10-CM

## 2018-12-14 RX ORDER — ZOLPIDEM TARTRATE 5 MG/1
2.5-5 TABLET ORAL
Qty: 30 TABLET | Refills: 0 | Status: SHIPPED | OUTPATIENT
Start: 2018-12-14 | End: 2019-01-14

## 2018-12-14 NOTE — TELEPHONE ENCOUNTER
Patient calling requesting medication be filled today.  She is upset and states her pharmacy has sent us 3 requests however writer only see's one request that came in today.  Informed patient that another request would be sent to PCP and partners today but there's no guarantee that it will be filled today as PCP has 72 hours to fill.

## 2018-12-14 NOTE — TELEPHONE ENCOUNTER
Hand signed RX faxed to the specific pharmacy by myself on behalf of Dr. Rodgers.   Direct future refills to her.

## 2018-12-14 NOTE — TELEPHONE ENCOUNTER
Requested Prescriptions   Pending Prescriptions Disp Refills     zolpidem (AMBIEN) 5 MG tablet 30 tablet 0     Sig: TAKE 1 TABLET BY MOUTH EVERY DAY AT BEDTIME    There is no refill protocol information for this order        zolpidem (AMBIEN) 5 MG tablet      Last Written Prescription Date:  11/14/18  Last Fill Quantity: 30,   # refills: 0  Last Office Visit: 4/24/18  Future Office visit:       Routing refill request to provider for review/approval because:  Drug not on the [facility], [facility] or [facility] refill protocol or controlled substance

## 2019-01-14 DIAGNOSIS — G47.00 INSOMNIA, UNSPECIFIED TYPE: ICD-10-CM

## 2019-01-14 RX ORDER — ZOLPIDEM TARTRATE 5 MG/1
TABLET ORAL
Qty: 30 TABLET | Refills: 0 | Status: SHIPPED | OUTPATIENT
Start: 2019-01-14 | End: 2019-02-15

## 2019-01-14 NOTE — TELEPHONE ENCOUNTER
Ambien      Last Written Prescription Date:  12/14/18  Last Fill Quantity: 30,   # refills: 0  Last Office Visit: 4/24/18  Future Office visit:       Routing refill request to provider for review/approval because:  Drug not on the [facility], [facility] or [facility] refill protocol or controlled substance

## 2019-02-15 DIAGNOSIS — G47.00 INSOMNIA, UNSPECIFIED TYPE: ICD-10-CM

## 2019-02-15 RX ORDER — ZOLPIDEM TARTRATE 5 MG/1
TABLET ORAL
Qty: 30 TABLET | Refills: 0 | Status: SHIPPED | OUTPATIENT
Start: 2019-02-15 | End: 2019-03-19

## 2019-02-15 NOTE — TELEPHONE ENCOUNTER
ambien      Last Written Prescription Date:  1/14/19  Last Fill Quantity: 30,   # refills: 0  Last Office Visit: 4/24/18  Future Office visit:       Routing refill request to provider for review/approval because:  Drug not on the [facility], [facility] or [facility] refill protocol or controlled substance

## 2019-03-06 ENCOUNTER — OFFICE VISIT (OUTPATIENT)
Dept: INTERNAL MEDICINE | Facility: CLINIC | Age: 64
End: 2019-03-06
Payer: COMMERCIAL

## 2019-03-06 VITALS
HEART RATE: 44 BPM | RESPIRATION RATE: 16 BRPM | BODY MASS INDEX: 30.36 KG/M2 | SYSTOLIC BLOOD PRESSURE: 132 MMHG | OXYGEN SATURATION: 99 % | HEIGHT: 62 IN | DIASTOLIC BLOOD PRESSURE: 72 MMHG | WEIGHT: 165 LBS

## 2019-03-06 DIAGNOSIS — Z11.4 SCREENING FOR HUMAN IMMUNODEFICIENCY VIRUS WITHOUT PRESENCE OF RISK FACTORS: ICD-10-CM

## 2019-03-06 DIAGNOSIS — S83.281D TEAR OF LATERAL MENISCUS OF RIGHT KNEE, CURRENT, UNSPECIFIED TEAR TYPE, SUBSEQUENT ENCOUNTER: ICD-10-CM

## 2019-03-06 DIAGNOSIS — Z01.818 PREOPERATIVE EXAMINATION: Primary | ICD-10-CM

## 2019-03-06 DIAGNOSIS — E03.9 HYPOTHYROIDISM, UNSPECIFIED TYPE: ICD-10-CM

## 2019-03-06 DIAGNOSIS — Z98.84 S/P GASTRIC BYPASS: ICD-10-CM

## 2019-03-06 DIAGNOSIS — Z12.11 SPECIAL SCREENING FOR MALIGNANT NEOPLASMS, COLON: ICD-10-CM

## 2019-03-06 DIAGNOSIS — M79.7 FIBROMYALGIA: ICD-10-CM

## 2019-03-06 DIAGNOSIS — I10 BENIGN ESSENTIAL HYPERTENSION: ICD-10-CM

## 2019-03-06 LAB
ALBUMIN SERPL-MCNC: 4 G/DL (ref 3.4–5)
ALP SERPL-CCNC: 55 U/L (ref 40–150)
ALT SERPL W P-5'-P-CCNC: 19 U/L (ref 0–50)
ANION GAP SERPL CALCULATED.3IONS-SCNC: 7 MMOL/L (ref 3–14)
AST SERPL W P-5'-P-CCNC: 21 U/L (ref 0–45)
BILIRUB SERPL-MCNC: 0.7 MG/DL (ref 0.2–1.3)
BUN SERPL-MCNC: 14 MG/DL (ref 7–30)
CALCIUM SERPL-MCNC: 9.1 MG/DL (ref 8.5–10.1)
CHLORIDE SERPL-SCNC: 111 MMOL/L (ref 94–109)
CO2 SERPL-SCNC: 25 MMOL/L (ref 20–32)
CREAT SERPL-MCNC: 1.02 MG/DL (ref 0.52–1.04)
ERYTHROCYTE [DISTWIDTH] IN BLOOD BY AUTOMATED COUNT: 12.9 % (ref 10–15)
FERRITIN SERPL-MCNC: 56 NG/ML (ref 8–252)
GFR SERPL CREATININE-BSD FRML MDRD: 58 ML/MIN/{1.73_M2}
GLUCOSE SERPL-MCNC: 87 MG/DL (ref 70–99)
HCT VFR BLD AUTO: 37.4 % (ref 35–47)
HGB BLD-MCNC: 12.1 G/DL (ref 11.7–15.7)
IRON SATN MFR SERPL: 26 % (ref 15–46)
IRON SERPL-MCNC: 88 UG/DL (ref 35–180)
MCH RBC QN AUTO: 30 PG (ref 26.5–33)
MCHC RBC AUTO-ENTMCNC: 32.4 G/DL (ref 31.5–36.5)
MCV RBC AUTO: 93 FL (ref 78–100)
MRSA DNA SPEC QL NAA+PROBE: NEGATIVE
PLATELET # BLD AUTO: 159 10E9/L (ref 150–450)
POTASSIUM SERPL-SCNC: 4.3 MMOL/L (ref 3.4–5.3)
PROT SERPL-MCNC: 6.9 G/DL (ref 6.8–8.8)
RBC # BLD AUTO: 4.04 10E12/L (ref 3.8–5.2)
SODIUM SERPL-SCNC: 143 MMOL/L (ref 133–144)
SPECIMEN SOURCE: NORMAL
T4 FREE SERPL-MCNC: 1.16 NG/DL (ref 0.76–1.46)
TIBC SERPL-MCNC: 337 UG/DL (ref 240–430)
TSH SERPL DL<=0.005 MIU/L-ACNC: 2.06 MU/L (ref 0.4–4)
VIT B12 SERPL-MCNC: 491 PG/ML (ref 193–986)
WBC # BLD AUTO: 5.5 10E9/L (ref 4–11)

## 2019-03-06 PROCEDURE — 80053 COMPREHEN METABOLIC PANEL: CPT | Performed by: INTERNAL MEDICINE

## 2019-03-06 PROCEDURE — 83540 ASSAY OF IRON: CPT | Performed by: INTERNAL MEDICINE

## 2019-03-06 PROCEDURE — 85027 COMPLETE CBC AUTOMATED: CPT | Performed by: INTERNAL MEDICINE

## 2019-03-06 PROCEDURE — 84443 ASSAY THYROID STIM HORMONE: CPT | Performed by: INTERNAL MEDICINE

## 2019-03-06 PROCEDURE — 82607 VITAMIN B-12: CPT | Performed by: INTERNAL MEDICINE

## 2019-03-06 PROCEDURE — 82728 ASSAY OF FERRITIN: CPT | Performed by: INTERNAL MEDICINE

## 2019-03-06 PROCEDURE — 93000 ELECTROCARDIOGRAM COMPLETE: CPT | Performed by: INTERNAL MEDICINE

## 2019-03-06 PROCEDURE — 99215 OFFICE O/P EST HI 40 MIN: CPT | Performed by: INTERNAL MEDICINE

## 2019-03-06 PROCEDURE — 36415 COLL VENOUS BLD VENIPUNCTURE: CPT | Performed by: INTERNAL MEDICINE

## 2019-03-06 PROCEDURE — 83550 IRON BINDING TEST: CPT | Performed by: INTERNAL MEDICINE

## 2019-03-06 PROCEDURE — 87389 HIV-1 AG W/HIV-1&-2 AB AG IA: CPT | Performed by: INTERNAL MEDICINE

## 2019-03-06 PROCEDURE — 84439 ASSAY OF FREE THYROXINE: CPT | Performed by: INTERNAL MEDICINE

## 2019-03-06 PROCEDURE — 82306 VITAMIN D 25 HYDROXY: CPT | Performed by: INTERNAL MEDICINE

## 2019-03-06 PROCEDURE — 87641 MR-STAPH DNA AMP PROBE: CPT | Performed by: INTERNAL MEDICINE

## 2019-03-06 ASSESSMENT — MIFFLIN-ST. JEOR: SCORE: 1255.66

## 2019-03-06 NOTE — PROGRESS NOTES
SUBJECTIVE:                                                      HPI: Ilana Shin is a pleasant 63 year old female who presents for preoperative evaluation.    Surgeon: Sisi  Date: 3/8/19  Location: [facility]  Surgery: RIGHT knee arthroscopy with partial lateral meniscectomy (intermediate risk)  Indication: RIGHT partial lateral meniscus tear    Past Medical History:   Diagnosis Date     Benign essential hypertension      Bradycardia      Fibromyalgia      Hypothyroidism      Moderate aortic stenosis      Osteopenia      Persistent insomnia      Personal or family history of excessive or prolonged bleeding? No  Personal or family history of blood clots? YES - mother and sister both with provoked DVTs  History of snoring/Sleep Apnea? No  History of blood transfusions? No    Clinical Predictors of Increased Risk: minor: bradycardia    Past Surgical History:   Procedure Laterality Date     BUNIONECTOMY Right 8/2014     BUNIONECTOMY LADONNA, REPAIR HAMMER TOE(S), COMBINED  09/2003    Left foot     C LAPAROSCOPIC JASS EN Y GASTROJEJUNOSTOMY  2006     CHOLECYSTECTOMY, OPEN  1994     RELEASE CARPAL TUNNEL BILATERAL  1999     TUBAL LIGATION  1984    age 29     Artificial assistive devices, such as pacemakers, artificial cardiac valves, or artificial joints? hardware left toe    Allergies   Allergen Reactions     Codeine Sulfate GI Disturbance     Personal or family history of allergy to anesthesia? No  Allergy to local or topical analgesics? No  Allergy to latex? No  Allergy to adhesives/skin preparations (chlorhexadine)? No    Current Outpatient Medications   Medication Sig     ferrous sulfate (SLO-FE) 142 (45 Fe) MG TBCR Take 1 tablet (142 mg) by mouth daily     ibuprofen 200 MG capsule Take 200 mg by mouth every 6 hours as needed - patient is still taking this      levothyroxine (SYNTHROID/LEVOTHROID) 100 MCG tablet Take 1 tablet (100 mcg) by mouth daily     losartan (COZAAR) 100 MG tablet Take 1 tablet (100 mg) by mouth  daily     zolpidem (AMBIEN) 5 MG tablet TAKE 1/2 TO 1 (ONE-HALF TO ONE) TABLET BY MOUTH AT BEDTIME AS NEEDED FOR SLEEP     Over the counter medications? Other than above, no  Vitamin Supplements? Other than above, no  Herbal Supplements? No    Family History   Problem Relation Age of Onset     Hypertension Mother      Deep Vein Thrombosis (DVT) Mother         x2 - both provoked (trauma, surgery)     Diabetes Type 2  Father      Hypertension Father      Breast Cancer Paternal Grandmother      Breast Cancer Sister 45     Myocardial Infarction Sister 50     Deep Vein Thrombosis (DVT) Sister         provoked (surgery)     Lung Cancer Brother         smoker     Hypertension Sister         x2     Cerebrovascular Disease No family hx of      Colon Cancer No family hx of      Ovarian Cancer No family hx of      Social History     Occupational History     Occupation: Retired - instrument tech   Tobacco Use     Smoking status: Former Smoker     Packs/day: 1.00     Years: 20.00     Pack years: 20.00     Types: Cigarettes     Last attempt to quit: 2002     Years since quittin.1     Smokeless tobacco: Never Used   Substance and Sexual Activity     Alcohol use: No     Drug use: No     Sexual activity: No   Social History Narrative    .    2 adult kids.    5 grand kids.    Walks and weight lifts regularly.      Functional capacity: Can do heavy work around the house such as scrubbing floors or lifting or moving heavy furniture (4-10 METs)    ROS:  Constitutional: denies unintentional weight loss or gain; denies fevers, chills, or sweats     Cardiovascular: denies chest pain, palpitations, or edema  Respiratory: denies cough, wheezing, shortness of breath, or dyspnea on exertion  Gastrointestinal: denies nausea, vomiting, constipation, diarrhea, or abdominal pain  Genitourinary: denies urinary frequency, urgency, dysuria, or hematuria  Integumentary: denies rash or pruritus  Musculoskeletal: denies back pain, muscle  "pain, joint pain, or joint swelling  Neurologic: denies focal weakness, numbness, or tingling  Hematologic/Immunologic: denies history of anemia or blood transfusions  Endocrine: denies heat or cold intolerance; denies polyuria, polydipsia  Psychiatric: denies depression or anxiety    OBJECTIVE:                                                      /72   Pulse (!) 44   Resp 16   Ht 1.581 m (5' 2.25\")   Wt 74.8 kg (165 lb)   LMP  (LMP Unknown)   SpO2 99%   BMI 29.94 kg/m    Constitutional: well-appearing  Eyes: normal conjunctivae and lids; pupils equal, round, and reactive to light  Ears, Nose, Mouth, and Throat: normal ears and nose; tympanic membranes visualized and normal; normal lips, teeth, and gums; no oropharyngeal lesions or ulcers  Neck: supple and symmetric; no lymphadenopathy; no thyromegaly or masses  Respiratory: normal respiratory effort; clear to auscultation bilaterally  Cardiovascular: regular rate and rhythm; pedal pulses palpable; no edema  Gastrointestinal: soft, non-tender, non-distended, and bowel sounds present; no organomegaly or masses  Musculoskeletal: normal gait and station  Psych: normal judgment and insight; normal mood and affect; recent and remote memory intact; oriented to time, place, and person    ASSESSMENT/PLAN:                                                      Ilana Shin is a pleasant 63 year old female who presents for a preoperative evaluation. She is at low clinical risk for an intermediate risk procedure.    (Z01.818) Preoperative examination  (primary encounter diagnosis)  (S83.281D) Tear of lateral meniscus of right knee, current, unspecified tear type, subsequent encounter  (I10) Benign essential hypertension  (M79.7) Fibromyalgia  (E03.9) Hypothyroidism, unspecified type  Comment: see above for pertinent positives.   Plan:    - EKG and MRSA nasal swab today.   - CBC and CMP today.   - HOLD ibuprofen NOW (surgery is in two days).   - HOLD losartan on the " morning of surgery.   - take levothyroxine with a sip of water on the morning of surgery.       (Z98.84) S/P gastric bypass  Comment: unrelated to surgery.  Plan: iron studies, vitamin B12 level, and vitamin D level with above labs.     (E03.9) Hypothyroidism, unspecified type  Comment: unrelated to surgery.   Plan: TFTs with above labs.     (Z11.4) Screening for human immunodeficiency virus without presence of risk factors  Comment: unrelated to surgery.   Plan: HIV screen with above labs.     (Z12.11) Special screening for malignant neoplasms, colon  Comment: unrelated to surgery.   Plan: FIT test ordered - patient to , completed and return when able.     RECOMMEND PROCEEDING WITH SURGERY: YES.     Thank you for referring Ilana Shin to me for consultation and allowing me to participate in her care.    The instructions on the AVS were discussed and explained to the patient. Patient expressed understanding of instructions.    (Chart documentation was completed, in part, with [employer] voice-recognition software. Even though reviewed, some grammatical, spelling, and word errors may remain.)    Prema Rodgers MD   [address] [address]  T: [phone], F: [phone]

## 2019-03-06 NOTE — H&P (VIEW-ONLY)
SUBJECTIVE:                                                      HPI: Ilana Shin is a pleasant 63 year old female who presents for preoperative evaluation.    Surgeon: Sisi  Date: 3/8/19  Location: [facility]  Surgery: RIGHT knee arthroscopy with partial lateral meniscectomy (intermediate risk)  Indication: RIGHT partial lateral meniscus tear    Past Medical History:   Diagnosis Date     Benign essential hypertension      Bradycardia      Fibromyalgia      Hypothyroidism      Moderate aortic stenosis      Osteopenia      Persistent insomnia      Personal or family history of excessive or prolonged bleeding? No  Personal or family history of blood clots? YES - mother and sister both with provoked DVTs  History of snoring/Sleep Apnea? No  History of blood transfusions? No    Clinical Predictors of Increased Risk: minor: bradycardia    Past Surgical History:   Procedure Laterality Date     BUNIONECTOMY Right 8/2014     BUNIONECTOMY LADONNA, REPAIR HAMMER TOE(S), COMBINED  09/2003    Left foot     C LAPAROSCOPIC JASS EN Y GASTROJEJUNOSTOMY  2006     CHOLECYSTECTOMY, OPEN  1994     RELEASE CARPAL TUNNEL BILATERAL  1999     TUBAL LIGATION  1984    age 29     Artificial assistive devices, such as pacemakers, artificial cardiac valves, or artificial joints? hardware left toe    Allergies   Allergen Reactions     Codeine Sulfate GI Disturbance     Personal or family history of allergy to anesthesia? No  Allergy to local or topical analgesics? No  Allergy to latex? No  Allergy to adhesives/skin preparations (chlorhexadine)? No    Current Outpatient Medications   Medication Sig     ferrous sulfate (SLO-FE) 142 (45 Fe) MG TBCR Take 1 tablet (142 mg) by mouth daily     ibuprofen 200 MG capsule Take 200 mg by mouth every 6 hours as needed - patient is still taking this      levothyroxine (SYNTHROID/LEVOTHROID) 100 MCG tablet Take 1 tablet (100 mcg) by mouth daily     losartan (COZAAR) 100 MG tablet Take 1 tablet (100 mg) by mouth  daily     zolpidem (AMBIEN) 5 MG tablet TAKE 1/2 TO 1 (ONE-HALF TO ONE) TABLET BY MOUTH AT BEDTIME AS NEEDED FOR SLEEP     Over the counter medications? Other than above, no  Vitamin Supplements? Other than above, no  Herbal Supplements? No    Family History   Problem Relation Age of Onset     Hypertension Mother      Deep Vein Thrombosis (DVT) Mother         x2 - both provoked (trauma, surgery)     Diabetes Type 2  Father      Hypertension Father      Breast Cancer Paternal Grandmother      Breast Cancer Sister 45     Myocardial Infarction Sister 50     Deep Vein Thrombosis (DVT) Sister         provoked (surgery)     Lung Cancer Brother         smoker     Hypertension Sister         x2     Cerebrovascular Disease No family hx of      Colon Cancer No family hx of      Ovarian Cancer No family hx of      Social History     Occupational History     Occupation: Retired - instrument tech   Tobacco Use     Smoking status: Former Smoker     Packs/day: 1.00     Years: 20.00     Pack years: 20.00     Types: Cigarettes     Last attempt to quit: 2002     Years since quittin.1     Smokeless tobacco: Never Used   Substance and Sexual Activity     Alcohol use: No     Drug use: No     Sexual activity: No   Social History Narrative    .    2 adult kids.    5 grand kids.    Walks and weight lifts regularly.      Functional capacity: Can do heavy work around the house such as scrubbing floors or lifting or moving heavy furniture (4-10 METs)    ROS:  Constitutional: denies unintentional weight loss or gain; denies fevers, chills, or sweats     Cardiovascular: denies chest pain, palpitations, or edema  Respiratory: denies cough, wheezing, shortness of breath, or dyspnea on exertion  Gastrointestinal: denies nausea, vomiting, constipation, diarrhea, or abdominal pain  Genitourinary: denies urinary frequency, urgency, dysuria, or hematuria  Integumentary: denies rash or pruritus  Musculoskeletal: denies back pain, muscle  "pain, joint pain, or joint swelling  Neurologic: denies focal weakness, numbness, or tingling  Hematologic/Immunologic: denies history of anemia or blood transfusions  Endocrine: denies heat or cold intolerance; denies polyuria, polydipsia  Psychiatric: denies depression or anxiety    OBJECTIVE:                                                      /72   Pulse (!) 44   Resp 16   Ht 1.581 m (5' 2.25\")   Wt 74.8 kg (165 lb)   LMP  (LMP Unknown)   SpO2 99%   BMI 29.94 kg/m    Constitutional: well-appearing  Eyes: normal conjunctivae and lids; pupils equal, round, and reactive to light  Ears, Nose, Mouth, and Throat: normal ears and nose; tympanic membranes visualized and normal; normal lips, teeth, and gums; no oropharyngeal lesions or ulcers  Neck: supple and symmetric; no lymphadenopathy; no thyromegaly or masses  Respiratory: normal respiratory effort; clear to auscultation bilaterally  Cardiovascular: regular rate and rhythm; pedal pulses palpable; no edema  Gastrointestinal: soft, non-tender, non-distended, and bowel sounds present; no organomegaly or masses  Musculoskeletal: normal gait and station  Psych: normal judgment and insight; normal mood and affect; recent and remote memory intact; oriented to time, place, and person    ASSESSMENT/PLAN:                                                      Ilana Shin is a pleasant 63 year old female who presents for a preoperative evaluation. She is at low clinical risk for an intermediate risk procedure.    (Z01.818) Preoperative examination  (primary encounter diagnosis)  (S83.281D) Tear of lateral meniscus of right knee, current, unspecified tear type, subsequent encounter  (I10) Benign essential hypertension  (M79.7) Fibromyalgia  (E03.9) Hypothyroidism, unspecified type  Comment: see above for pertinent positives.   Plan:    - EKG and MRSA nasal swab today.   - CBC and CMP today.   - HOLD ibuprofen NOW (surgery is in two days).   - HOLD losartan on the " morning of surgery.   - take levothyroxine with a sip of water on the morning of surgery.       (Z98.84) S/P gastric bypass  Comment: unrelated to surgery.  Plan: iron studies, vitamin B12 level, and vitamin D level with above labs.     (E03.9) Hypothyroidism, unspecified type  Comment: unrelated to surgery.   Plan: TFTs with above labs.     (Z11.4) Screening for human immunodeficiency virus without presence of risk factors  Comment: unrelated to surgery.   Plan: HIV screen with above labs.     (Z12.11) Special screening for malignant neoplasms, colon  Comment: unrelated to surgery.   Plan: FIT test ordered - patient to , completed and return when able.     RECOMMEND PROCEEDING WITH SURGERY: YES.     Thank you for referring Ilana Shin to me for consultation and allowing me to participate in her care.    The instructions on the AVS were discussed and explained to the patient. Patient expressed understanding of instructions.    (Chart documentation was completed, in part, with [employer] voice-recognition software. Even though reviewed, some grammatical, spelling, and word errors may remain.)    Prema Rodgers MD   [address] [address]  T: [phone], F: [phone]

## 2019-03-06 NOTE — PATIENT INSTRUCTIONS
See if new shingles series (Shingrix) is covered (recommended).     Mammogram by this fall please!    Labs today.     ---    HOLD ibuprofen 1 week prior to surgery.    HOLD losartan on the morning of surgery.    Continue all other medications (take levothyroxine on the morning of surgery with a sip of water).

## 2019-03-07 LAB
DEPRECATED CALCIDIOL+CALCIFEROL SERPL-MC: 37 UG/L (ref 20–75)
HIV 1+2 AB+HIV1 P24 AG SERPL QL IA: NONREACTIVE

## 2019-03-08 ENCOUNTER — ANESTHESIA EVENT (OUTPATIENT)
Dept: SURGERY | Facility: CLINIC | Age: 64
End: 2019-03-08
Payer: COMMERCIAL

## 2019-03-08 ENCOUNTER — ANESTHESIA (OUTPATIENT)
Dept: SURGERY | Facility: CLINIC | Age: 64
End: 2019-03-08
Payer: COMMERCIAL

## 2019-03-08 ENCOUNTER — HOSPITAL ENCOUNTER (OUTPATIENT)
Facility: CLINIC | Age: 64
Discharge: HOME | End: 2019-03-08
Attending: ORTHOPAEDIC SURGERY | Admitting: ORTHOPAEDIC SURGERY
Payer: COMMERCIAL

## 2019-03-08 VITALS
TEMPERATURE: 96.4 F | WEIGHT: 164.4 LBS | HEIGHT: 63 IN | HEART RATE: 50 BPM | DIASTOLIC BLOOD PRESSURE: 70 MMHG | SYSTOLIC BLOOD PRESSURE: 141 MMHG | OXYGEN SATURATION: 100 % | BODY MASS INDEX: 29.13 KG/M2 | RESPIRATION RATE: 16 BRPM

## 2019-03-08 DIAGNOSIS — M25.569 CHRONIC KNEE PAIN, UNSPECIFIED LATERALITY: Primary | ICD-10-CM

## 2019-03-08 DIAGNOSIS — G89.29 CHRONIC KNEE PAIN, UNSPECIFIED LATERALITY: Primary | ICD-10-CM

## 2019-03-08 PROCEDURE — 25000132 ZZH RX MED GY IP 250 OP 250 PS 637: Performed by: PHYSICIAN ASSISTANT

## 2019-03-08 PROCEDURE — 37000008 ZZH ANESTHESIA TECHNICAL FEE, 1ST 30 MIN: Performed by: ORTHOPAEDIC SURGERY

## 2019-03-08 PROCEDURE — 25800030 ZZH RX IP 258 OP 636: Performed by: NURSE ANESTHETIST, CERTIFIED REGISTERED

## 2019-03-08 PROCEDURE — 27210794 ZZH OR GENERAL SUPPLY STERILE: Performed by: ORTHOPAEDIC SURGERY

## 2019-03-08 PROCEDURE — 25000128 H RX IP 250 OP 636: Performed by: ORTHOPAEDIC SURGERY

## 2019-03-08 PROCEDURE — 25800025 ZZH RX 258: Performed by: ORTHOPAEDIC SURGERY

## 2019-03-08 PROCEDURE — 36000056 ZZH SURGERY LEVEL 3 1ST 30 MIN: Performed by: ORTHOPAEDIC SURGERY

## 2019-03-08 PROCEDURE — 40000170 ZZH STATISTIC PRE-PROCEDURE ASSESSMENT II: Performed by: ORTHOPAEDIC SURGERY

## 2019-03-08 PROCEDURE — 25000566 ZZH SEVOFLURANE, EA 15 MIN: Performed by: ORTHOPAEDIC SURGERY

## 2019-03-08 PROCEDURE — 25000128 H RX IP 250 OP 636: Performed by: NURSE ANESTHETIST, CERTIFIED REGISTERED

## 2019-03-08 PROCEDURE — 71000012 ZZH RECOVERY PHASE 1 LEVEL 1 FIRST HR: Performed by: ORTHOPAEDIC SURGERY

## 2019-03-08 PROCEDURE — 25000132 ZZH RX MED GY IP 250 OP 250 PS 637: Performed by: ANESTHESIOLOGY

## 2019-03-08 PROCEDURE — 71000013 ZZH RECOVERY PHASE 1 LEVEL 1 EA ADDTL HR: Performed by: ORTHOPAEDIC SURGERY

## 2019-03-08 PROCEDURE — 37000009 ZZH ANESTHESIA TECHNICAL FEE, EACH ADDTL 15 MIN: Performed by: ORTHOPAEDIC SURGERY

## 2019-03-08 PROCEDURE — 71000027 ZZH RECOVERY PHASE 2 EACH 15 MINS: Performed by: ORTHOPAEDIC SURGERY

## 2019-03-08 PROCEDURE — 36000058 ZZH SURGERY LEVEL 3 EA 15 ADDTL MIN: Performed by: ORTHOPAEDIC SURGERY

## 2019-03-08 PROCEDURE — 25000125 ZZHC RX 250: Performed by: NURSE ANESTHETIST, CERTIFIED REGISTERED

## 2019-03-08 RX ORDER — ACETAMINOPHEN 500 MG
1000 TABLET ORAL ONCE
Status: COMPLETED | OUTPATIENT
Start: 2019-03-08 | End: 2019-03-08

## 2019-03-08 RX ORDER — FENTANYL CITRATE 50 UG/ML
25-50 INJECTION, SOLUTION INTRAMUSCULAR; INTRAVENOUS EVERY 5 MIN PRN
Status: DISCONTINUED | OUTPATIENT
Start: 2019-03-08 | End: 2019-03-08 | Stop reason: HOSPADM

## 2019-03-08 RX ORDER — IBUPROFEN 600 MG/1
600 TABLET, FILM COATED ORAL
Status: DISCONTINUED | OUTPATIENT
Start: 2019-03-08 | End: 2019-03-08 | Stop reason: HOSPADM

## 2019-03-08 RX ORDER — ROPIVACAINE HYDROCHLORIDE 5 MG/ML
INJECTION, SOLUTION EPIDURAL; INFILTRATION; PERINEURAL PRN
Status: DISCONTINUED | OUTPATIENT
Start: 2019-03-08 | End: 2019-03-08 | Stop reason: HOSPADM

## 2019-03-08 RX ORDER — DEXAMETHASONE SODIUM PHOSPHATE 4 MG/ML
4 INJECTION, SOLUTION INTRA-ARTICULAR; INTRALESIONAL; INTRAMUSCULAR; INTRAVENOUS; SOFT TISSUE EVERY 10 MIN PRN
Status: DISCONTINUED | OUTPATIENT
Start: 2019-03-08 | End: 2019-03-08 | Stop reason: HOSPADM

## 2019-03-08 RX ORDER — AMOXICILLIN 250 MG
1-2 CAPSULE ORAL 2 TIMES DAILY
Qty: 30 TABLET | Refills: 0 | Status: SHIPPED | OUTPATIENT
Start: 2019-03-08 | End: 2019-08-08

## 2019-03-08 RX ORDER — ACETAMINOPHEN 325 MG/1
650 TABLET ORAL EVERY 4 HOURS PRN
Qty: 50 TABLET | Refills: 0 | Status: SHIPPED | OUTPATIENT
Start: 2019-03-08 | End: 2022-10-11

## 2019-03-08 RX ORDER — ONDANSETRON 4 MG/1
4-8 TABLET, ORALLY DISINTEGRATING ORAL EVERY 8 HOURS PRN
Qty: 4 TABLET | Refills: 0 | Status: SHIPPED | OUTPATIENT
Start: 2019-03-08 | End: 2019-08-08

## 2019-03-08 RX ORDER — SODIUM CHLORIDE, SODIUM LACTATE, POTASSIUM CHLORIDE, CALCIUM CHLORIDE 600; 310; 30; 20 MG/100ML; MG/100ML; MG/100ML; MG/100ML
INJECTION, SOLUTION INTRAVENOUS CONTINUOUS
Status: DISCONTINUED | OUTPATIENT
Start: 2019-03-08 | End: 2019-03-08 | Stop reason: HOSPADM

## 2019-03-08 RX ORDER — HYDROMORPHONE HYDROCHLORIDE 1 MG/ML
.3-.5 INJECTION, SOLUTION INTRAMUSCULAR; INTRAVENOUS; SUBCUTANEOUS EVERY 10 MIN PRN
Status: DISCONTINUED | OUTPATIENT
Start: 2019-03-08 | End: 2019-03-08 | Stop reason: HOSPADM

## 2019-03-08 RX ORDER — ONDANSETRON 4 MG/1
4 TABLET, ORALLY DISINTEGRATING ORAL
Status: DISCONTINUED | OUTPATIENT
Start: 2019-03-08 | End: 2019-03-08 | Stop reason: HOSPADM

## 2019-03-08 RX ORDER — CEFAZOLIN SODIUM 1 G/3ML
1 INJECTION, POWDER, FOR SOLUTION INTRAMUSCULAR; INTRAVENOUS SEE ADMIN INSTRUCTIONS
Status: DISCONTINUED | OUTPATIENT
Start: 2019-03-08 | End: 2019-03-08 | Stop reason: HOSPADM

## 2019-03-08 RX ORDER — MEPERIDINE HYDROCHLORIDE 25 MG/ML
12.5 INJECTION INTRAMUSCULAR; INTRAVENOUS; SUBCUTANEOUS
Status: DISCONTINUED | OUTPATIENT
Start: 2019-03-08 | End: 2019-03-08 | Stop reason: HOSPADM

## 2019-03-08 RX ORDER — ONDANSETRON 2 MG/ML
INJECTION INTRAMUSCULAR; INTRAVENOUS PRN
Status: DISCONTINUED | OUTPATIENT
Start: 2019-03-08 | End: 2019-03-08

## 2019-03-08 RX ORDER — PROPOFOL 10 MG/ML
INJECTION, EMULSION INTRAVENOUS PRN
Status: DISCONTINUED | OUTPATIENT
Start: 2019-03-08 | End: 2019-03-08

## 2019-03-08 RX ORDER — FENTANYL CITRATE 50 UG/ML
INJECTION, SOLUTION INTRAMUSCULAR; INTRAVENOUS PRN
Status: DISCONTINUED | OUTPATIENT
Start: 2019-03-08 | End: 2019-03-08

## 2019-03-08 RX ORDER — DIMENHYDRINATE 50 MG/ML
12.5 INJECTION, SOLUTION INTRAMUSCULAR; INTRAVENOUS
Status: DISCONTINUED | OUTPATIENT
Start: 2019-03-08 | End: 2019-03-08 | Stop reason: HOSPADM

## 2019-03-08 RX ORDER — ONDANSETRON 4 MG/1
4 TABLET, ORALLY DISINTEGRATING ORAL EVERY 30 MIN PRN
Status: DISCONTINUED | OUTPATIENT
Start: 2019-03-08 | End: 2019-03-08 | Stop reason: HOSPADM

## 2019-03-08 RX ORDER — NALOXONE HYDROCHLORIDE 0.4 MG/ML
.1-.4 INJECTION, SOLUTION INTRAMUSCULAR; INTRAVENOUS; SUBCUTANEOUS
Status: DISCONTINUED | OUTPATIENT
Start: 2019-03-08 | End: 2019-03-08 | Stop reason: HOSPADM

## 2019-03-08 RX ORDER — ALBUTEROL SULFATE 0.83 MG/ML
2.5 SOLUTION RESPIRATORY (INHALATION)
Status: DISCONTINUED | OUTPATIENT
Start: 2019-03-08 | End: 2019-03-08 | Stop reason: HOSPADM

## 2019-03-08 RX ORDER — DEXAMETHASONE SODIUM PHOSPHATE 4 MG/ML
INJECTION, SOLUTION INTRA-ARTICULAR; INTRALESIONAL; INTRAMUSCULAR; INTRAVENOUS; SOFT TISSUE PRN
Status: DISCONTINUED | OUTPATIENT
Start: 2019-03-08 | End: 2019-03-08

## 2019-03-08 RX ORDER — ONDANSETRON 2 MG/ML
4 INJECTION INTRAMUSCULAR; INTRAVENOUS EVERY 30 MIN PRN
Status: DISCONTINUED | OUTPATIENT
Start: 2019-03-08 | End: 2019-03-08 | Stop reason: HOSPADM

## 2019-03-08 RX ORDER — SODIUM CHLORIDE, SODIUM LACTATE, POTASSIUM CHLORIDE, CALCIUM CHLORIDE 600; 310; 30; 20 MG/100ML; MG/100ML; MG/100ML; MG/100ML
INJECTION, SOLUTION INTRAVENOUS CONTINUOUS PRN
Status: DISCONTINUED | OUTPATIENT
Start: 2019-03-08 | End: 2019-03-08

## 2019-03-08 RX ORDER — CEFAZOLIN SODIUM 1 G/3ML
1 INJECTION, POWDER, FOR SOLUTION INTRAMUSCULAR; INTRAVENOUS SEE ADMIN INSTRUCTIONS
Status: DISCONTINUED | OUTPATIENT
Start: 2019-03-08 | End: 2019-03-08

## 2019-03-08 RX ORDER — OXYCODONE HYDROCHLORIDE 5 MG/1
5 TABLET ORAL
Status: COMPLETED | OUTPATIENT
Start: 2019-03-08 | End: 2019-03-08

## 2019-03-08 RX ORDER — HYDROXYZINE HYDROCHLORIDE 10 MG/1
10 TABLET, FILM COATED ORAL 3 TIMES DAILY PRN
Qty: 30 TABLET | Refills: 0 | Status: SHIPPED | OUTPATIENT
Start: 2019-03-08 | End: 2019-08-08

## 2019-03-08 RX ORDER — ASPIRIN 81 MG/1
81 TABLET, CHEWABLE ORAL 2 TIMES DAILY
Qty: 28 TABLET | Refills: 0 | Status: SHIPPED | OUTPATIENT
Start: 2019-03-08 | End: 2019-08-08

## 2019-03-08 RX ORDER — KETOROLAC TROMETHAMINE 30 MG/ML
30 INJECTION, SOLUTION INTRAMUSCULAR; INTRAVENOUS EVERY 6 HOURS PRN
Status: DISCONTINUED | OUTPATIENT
Start: 2019-03-08 | End: 2019-03-08 | Stop reason: HOSPADM

## 2019-03-08 RX ORDER — CEFAZOLIN SODIUM 2 G/100ML
2 INJECTION, SOLUTION INTRAVENOUS
Status: DISCONTINUED | OUTPATIENT
Start: 2019-03-08 | End: 2019-03-08 | Stop reason: HOSPADM

## 2019-03-08 RX ORDER — CEFAZOLIN SODIUM 2 G/100ML
2 INJECTION, SOLUTION INTRAVENOUS
Status: COMPLETED | OUTPATIENT
Start: 2019-03-08 | End: 2019-03-08

## 2019-03-08 RX ORDER — OXYCODONE HYDROCHLORIDE 5 MG/1
5 TABLET ORAL EVERY 4 HOURS PRN
Qty: 20 TABLET | Refills: 0 | Status: SHIPPED | OUTPATIENT
Start: 2019-03-08 | End: 2019-08-08

## 2019-03-08 RX ORDER — LIDOCAINE HYDROCHLORIDE 20 MG/ML
INJECTION, SOLUTION INFILTRATION; PERINEURAL PRN
Status: DISCONTINUED | OUTPATIENT
Start: 2019-03-08 | End: 2019-03-08

## 2019-03-08 RX ORDER — HYDROXYZINE HYDROCHLORIDE 10 MG/1
10 TABLET, FILM COATED ORAL
Status: DISCONTINUED | OUTPATIENT
Start: 2019-03-08 | End: 2019-03-08 | Stop reason: HOSPADM

## 2019-03-08 RX ORDER — LORAZEPAM 2 MG/ML
.5-1 INJECTION INTRAMUSCULAR
Status: DISCONTINUED | OUTPATIENT
Start: 2019-03-08 | End: 2019-03-08 | Stop reason: HOSPADM

## 2019-03-08 RX ORDER — IBUPROFEN 600 MG/1
600 TABLET, FILM COATED ORAL EVERY 6 HOURS PRN
Qty: 40 TABLET | Refills: 0 | Status: SHIPPED | OUTPATIENT
Start: 2019-03-08 | End: 2020-04-08

## 2019-03-08 RX ADMIN — ONDANSETRON 4 MG: 2 INJECTION INTRAMUSCULAR; INTRAVENOUS at 07:45

## 2019-03-08 RX ADMIN — FENTANYL CITRATE 50 MCG: 50 INJECTION, SOLUTION INTRAMUSCULAR; INTRAVENOUS at 07:35

## 2019-03-08 RX ADMIN — DEXAMETHASONE SODIUM PHOSPHATE 4 MG: 4 INJECTION, SOLUTION INTRA-ARTICULAR; INTRALESIONAL; INTRAMUSCULAR; INTRAVENOUS; SOFT TISSUE at 07:45

## 2019-03-08 RX ADMIN — PHENYLEPHRINE HYDROCHLORIDE 100 MCG: 10 INJECTION, SOLUTION INTRAMUSCULAR; INTRAVENOUS; SUBCUTANEOUS at 07:35

## 2019-03-08 RX ADMIN — OXYCODONE HYDROCHLORIDE 5 MG: 5 TABLET ORAL at 08:49

## 2019-03-08 RX ADMIN — CEFAZOLIN SODIUM 2 G: 2 INJECTION, SOLUTION INTRAVENOUS at 07:38

## 2019-03-08 RX ADMIN — FENTANYL CITRATE 50 MCG: 50 INJECTION, SOLUTION INTRAMUSCULAR; INTRAVENOUS at 07:50

## 2019-03-08 RX ADMIN — PROPOFOL 20 MG: 10 INJECTION, EMULSION INTRAVENOUS at 07:48

## 2019-03-08 RX ADMIN — PROPOFOL 10 MG: 10 INJECTION, EMULSION INTRAVENOUS at 07:35

## 2019-03-08 RX ADMIN — PROPOFOL 30 MG: 10 INJECTION, EMULSION INTRAVENOUS at 07:41

## 2019-03-08 RX ADMIN — PROPOFOL 40 MG: 10 INJECTION, EMULSION INTRAVENOUS at 07:44

## 2019-03-08 RX ADMIN — FENTANYL CITRATE 50 MCG: 50 INJECTION, SOLUTION INTRAMUSCULAR; INTRAVENOUS at 07:44

## 2019-03-08 RX ADMIN — PROPOFOL 10 MG: 10 INJECTION, EMULSION INTRAVENOUS at 07:50

## 2019-03-08 RX ADMIN — PHENYLEPHRINE HYDROCHLORIDE 100 MCG: 10 INJECTION, SOLUTION INTRAMUSCULAR; INTRAVENOUS; SUBCUTANEOUS at 07:39

## 2019-03-08 RX ADMIN — ACETAMINOPHEN 1000 MG: 500 TABLET, FILM COATED ORAL at 07:07

## 2019-03-08 RX ADMIN — SODIUM CHLORIDE, POTASSIUM CHLORIDE, SODIUM LACTATE AND CALCIUM CHLORIDE: 600; 310; 30; 20 INJECTION, SOLUTION INTRAVENOUS at 07:23

## 2019-03-08 RX ADMIN — MIDAZOLAM 2 MG: 1 INJECTION INTRAMUSCULAR; INTRAVENOUS at 07:25

## 2019-03-08 RX ADMIN — LIDOCAINE HYDROCHLORIDE 100 MG: 20 INJECTION, SOLUTION INFILTRATION; PERINEURAL at 07:35

## 2019-03-08 ASSESSMENT — ENCOUNTER SYMPTOMS
DYSRHYTHMIAS: 0
SEIZURES: 0

## 2019-03-08 ASSESSMENT — LIFESTYLE VARIABLES: TOBACCO_USE: 0

## 2019-03-08 ASSESSMENT — COPD QUESTIONNAIRES: COPD: 0

## 2019-03-08 ASSESSMENT — MIFFLIN-ST. JEOR: SCORE: 1256.9

## 2019-03-08 NOTE — ANESTHESIA CARE TRANSFER NOTE
Patient: Ilana Shin    Procedure(s):  RIGHT KNEE  ARTHROSCOPIC, CHONDROPLASTY OF THE MEDIAL CONDRA,  PARTIAL  LATERAL MENISCECTOMY    Diagnosis: RIGHT KNEE LATERAL MENISCUS TEAR  Diagnosis Additional Information: No value filed.    Anesthesia Type:   General, LMA     Note:  Airway :Face Mask  Patient transferred to:PACU  Comments: At end of procedure, spontaneous respirations, adequate tidal volumes, followed commands to voice, LMA removed atraumatically, oropharynx suctioned, airway patent after LMA removal. Oxygen via facemask at 8 liters per minute to PACU. Oxygen tubing connected to wall O2 in PACU, SpO2, NiBP, and EKG monitors and alarms on and functioning, Abel Hugger warmer connected to patient gown, report on patient's clinical status given to PACU RN, RN questions answered.Handoff Report: Identifed the Patient, Identified the Reponsible Provider, Reviewed the pertinent medical history, Discussed the surgical course, Reviewed Intra-OP anesthesia mangement and issues during anesthesia, Set expectations for post-procedure period and Allowed opportunity for questions and acknowledgement of understanding      Vitals: (Last set prior to Anesthesia Care Transfer)    CRNA VITALS  3/8/2019 0740 - 3/8/2019 0817      3/8/2019             Pulse:  59    SpO2:  99 %    Resp Rate (set):  10                Electronically Signed By: MAURO Berg CRNA  March 8, 2019  8:17 AM

## 2019-03-08 NOTE — OR NURSING
Pt declines crutches- has one crutch at home. AOX3-VSS-O2 sats >92% RA- Good PO intake, Denies c/o- Pt and responsible adult verbalize understanding of discharge instructions, denies questions. Up in W/C - transported to door for discharge to home.

## 2019-03-08 NOTE — ANESTHESIA PREPROCEDURE EVALUATION
Anesthesia Pre-Procedure Evaluation    Patient: Ilana Shin   MRN: [id number] : 1955          Preoperative Diagnosis: RIGHT KNEE LATERAL MENISCUS TEAR    Procedure(s):  RIGHT KNEE   ARTHROSCOPIC   PARTIAL  LATERAL MENISCECTOMY    Past Medical History:   Diagnosis Date     Benign essential hypertension      Bradycardia      CKD (chronic kidney disease), stage III (H)      Fibromyalgia      Hypothyroidism      Moderate aortic stenosis      Osteopenia      Persistent insomnia      Past Surgical History:   Procedure Laterality Date     BUNIONECTOMY Right 2014     BUNIONECTOMY LADONNA, REPAIR HAMMER TOE(S), COMBINED  2003    Left foot     C LAPAROSCOPIC JASS EN Y GASTROJEJUNOSTOMY       CHOLECYSTECTOMY, OPEN       RELEASE CARPAL TUNNEL BILATERAL       TUBAL LIGATION      age 29       Anesthesia Evaluation     .             ROS/MED HX    ENT/Pulmonary:      (-) tobacco use, asthma, COPD and sleep apnea   Neurologic:      (-) seizures, CVA and TIA   Cardiovascular:     (+) hypertension----. : . . . :. valvular problems/murmurs type: AS . Previous cardiac testing Echodate:3/2018results:Interpretation Summary     Moderate valvular aortic stenosis.  Left ventricular systolic function is normal.  The visual ejection fraction is estimated at 60-65%.  The left atrium is moderately dilated.  Sinus rhythm was noted.     The mean systolic gradient was 20.2 with a calculated EMILY =1.25cm2. There are  no old studies available for comparison.  _____________________________________________________________________________  __        Left Ventricle  The left ventricle is normal in size. There is normal left ventricular wall  thickness. Left ventricular systolic function is normal. The visual ejection  fraction is estimated at 60-65%. Grade II or moderate diastolic dysfunction.  No regional wall motion abnormalities noted.     Right Ventricle  The right ventricle is normal in structure, function and size. There  is no  mass or thrombus in the right ventricle.     Atria  The left atrium is moderately dilated. Right atrial size is normal. There is  no atrial shunt seen.     Mitral Valve  The mitral valve leaflets are mildly thickened. There is no mitral  regurgitation noted. There is no mitral valve stenosis.        Tricuspid Valve  Normal tricuspid valve. Right ventricle systolic pressure estimate normal.  There is no tricuspid stenosis.     Aortic Valve  There is moderate trileaflet aortic sclerosis. No aortic regurgitation is  present. Moderate valvular aortic stenosis. The mean AoV pressure gradient is  20.2 mmHg.     Pulmonic Valve  Normal pulmonic valve. There is no pulmonic valvular regurgitation. There is  no pulmonic valvular stenosis.     Vessels  The aortic root is normal size. Normal size ascending aorta. The IVC is normal  in size and reactivity with respiration, suggesting normal central venous  pressure. The pulmonary artery is normal size. Normal pulmonary vein velocity.     Pericardium  The pericardium appears normal. There is no pleural effusion.        Rhythm  Sinus rhythm was noted.date: results:ECG reviewed date:3/2019 results:SB at 44 bpm date: results:         (-) CAD, CHF and arrhythmias   METS/Exercise Tolerance:     Hematologic:         Musculoskeletal:         GI/Hepatic:        (-) GERD and liver disease   Renal/Genitourinary:     (+) chronic renal disease, type: CRI,       Endo: Comment: BMI 30    (+) thyroid problem hypothyroidism, Obesity, .   (-) Type I DM and Type II DM   Psychiatric:         Infectious Disease:         Malignancy:         Other: Comment: Fibromyalgia                         Physical Exam  Normal systems: dental    Airway   Mallampati: II  TM distance: >3 FB  Neck ROM: full    Dental     Cardiovascular   Rhythm and rate: regular  (+) murmur       Pulmonary    breath sounds clear to auscultation            Lab Results   Component Value Date    WBC 5.5 03/06/2019    HGB 12.1  "03/06/2019    HCT 37.4 03/06/2019     03/06/2019    CRP 1.9 07/15/2011    SED 24.0 (H) 07/15/2011     03/06/2019    POTASSIUM 4.3 03/06/2019    CHLORIDE 111 (H) 03/06/2019    CO2 25 03/06/2019    BUN 14 03/06/2019    CR 1.02 03/06/2019    GLC 87 03/06/2019    TIFFANY 9.1 03/06/2019    PHOS 3.7 02/27/2018    MAG 2.1 02/27/2018    ALBUMIN 4.0 03/06/2019    PROTTOTAL 6.9 03/06/2019    ALT 19 03/06/2019    AST 21 03/06/2019    ALKPHOS 55 03/06/2019    BILITOTAL 0.7 03/06/2019    PTT 24 08/15/2006    INR 0.89 08/27/2013    TSH 2.06 03/06/2019    T4 1.16 03/06/2019       Preop Vitals  BP Readings from Last 3 Encounters:   03/06/19 132/72   05/07/18 128/68   04/24/18 124/80    Pulse Readings from Last 3 Encounters:   03/06/19 (!) 44   04/24/18 96   04/18/18 56      Resp Readings from Last 3 Encounters:   03/06/19 16   04/24/18 21   03/07/17 18    SpO2 Readings from Last 3 Encounters:   03/06/19 99%   04/18/18 99%   02/27/18 98%      Temp Readings from Last 1 Encounters:   04/24/18 36.6  C (97.8  F) (Oral)    Ht Readings from Last 1 Encounters:   03/06/19 1.581 m (5' 2.25\")      Wt Readings from Last 1 Encounters:   03/06/19 74.8 kg (165 lb)    Estimated body mass index is 29.94 kg/m  as calculated from the following:    Height as of 3/6/19: 1.581 m (5' 2.25\").    Weight as of 3/6/19: 74.8 kg (165 lb).       Anesthesia Plan      History & Physical Review  History and physical reviewed and following examination; no interval change.    ASA Status:  3 .    NPO Status:  > 8 hours    Plan for General and LMA with Intravenous and Propofol induction. Maintenance will be Balanced.    PONV prophylaxis:  Ondansetron (or other 5HT-3) and Dexamethasone or Solumedrol       Postoperative Care  Postoperative pain management:  Multi-modal analgesia.      Consents  Anesthetic plan, risks, benefits and alternatives discussed with:  Patient..                 Deshawn Alvarez MD  "

## 2019-03-08 NOTE — DISCHARGE INSTRUCTIONS
Today you were given 1000 mg of Tylenol at 0710. The recommended daily maximum dose is 4000 mg.     Restart your aspirin medication tomorrow    Same Day Surgery Discharge Instructions for  Sedation and General Anesthesia       It's not unusual to feel dizzy, light-headed or faint for up to 24 hours after surgery or while taking pain medication.  If you have these symptoms: sit for a few minutes before standing and have someone assist you when you get up to walk or use the bathroom.      You should rest and relax for the next 24 hours. We recommend you make arrangements to have an adult stay with you for at least 24 hours after your discharge.  Avoid hazardous and strenuous activity.      DO NOT DRIVE any vehicle or operate mechanical equipment for 24 hours following the end of your surgery.  Even though you may feel normal, your reactions may be affected by the medication you have received.      Do not drink alcoholic beverages for 24 hours following surgery.       Slowly progress to your regular diet as you feel able. It's not unusual to feel nauseated and/or vomit after receiving anesthesia.  If you develop these symptoms, drink clear liquids (apple juice, ginger ale, broth, 7-up, etc. ) until you feel better.  If your nausea and vomiting persists for 24 hours, please notify your surgeon.        All narcotic pain medications, along with inactivity and anesthesia, can cause constipation. Drinking plenty of liquids and increasing fiber intake will help.      For any questions of a medical nature, call your surgeon.      Do not make important decisions for 24 hours.      If you had general anesthesia, you may have a sore throat for a couple of days related to the breathing tube used during surgery.  You may use Cepacol lozenges to help with this discomfort.  If it worsens or if you develop a fever, contact your surgeon.       If you feel your pain is not well managed with the pain medications prescribed by your  surgeon, please contact your surgeon's office to let them know so they can address your concerns.       **Dr. Laird prescribed Aspirin 81mg twice daily for 2 weeks starting tomorrow 3/9/19. Recommended to touch base with your primary care provider to discuss (due to history of gastric bypass).     Crutch Instructions      Because of your surgery you will need crutches.  Make sure you tell your healthcare provider if crutches do not seem to work for you.  Using Crutches   Crutches are the most commonly used device for injuries to the lower part of the body because they allow the most mobility. All walking assistance devices require strength in your upper body but crutches require more coordination. If you are unable to use crutches then a cane or walker may be better.   Remember these rules when using crutches:   Always look straight ahead when you walk. Do not look down.   Hold the top padded part of the crutches into your chest near your armpits. Grasp the padded hand  and always support your weight with your arms and hands.   Do not lean on the crutches with your armpit as this could cause nerve damage.  Standing Up  Make sure you are in a stable chair. Move forward to the edge of the chair so that your  good  foot is flat on the floor. Put both crutches together and hold the handgrips in one hand. Stretch the injured leg out straight and then use the crutches with one hand and the chair armrest with the other hand to push yourself into a standing position onto your  good  leg. Once you are standing, wait until you are steady before placing a crutch in each hand. Until you become good at standing, have someone assist you in case you lose your balance. When standing still, lean slightly forward with the crutches ahead of you and about 3 feet apart. Unless you are told otherwise, you should keep weight off your injured leg.  Walking  To begin crutch walking, balance yourself on your  good  leg. Move both  "crutches forward about the length of one step and place them firmly on the floor in front of you about 3 feet apart. Support your weight on the padded hand rests while leaning forward. Press the top of the crutches against your chest wall and not into your armpits. Be sure to hold the injured leg up off of the floor. When ready, swing the \"good\" leg forward about one step length past the crutches while supporting your weight on the padded hand . Be careful not to go too far. Transfer your weight onto the \"good\" leg then bring both crutches forward about the length of one step. Repeating this motion will allow you to move fairly quickly without the use of your injured leg. Keep practicing until you become good at crutch walking.  Sitting Down  Make sure the chair you are about to sit on is stable. Walk in front of the chair such that you are facing away from it. Move back a little at a time until the back of your  good  leg is nearly touching the seat. Keeping your weight on the  good  leg, move both crutches to one hand holding onto the handgrips. Lean forward, bend your  good  knee, and move your injured leg out forward. Sit down slowly while using your free hand to reach for the chair s armrest for support. Place the crutches where you can easily get them. Never pivot, even on your  good  leg. This could cause you to fall or injure your  good  leg.  Navigating Stairs, Curbs & Door Steps  Only attempt this once you are very comfortable with regular crutch walking and only when someone is there to steady you should you begin to lose your balance. Hold on to a  railing with one hand and both crutches in the other hand or have someone carry the loose crutch for you. It does not matter which side the handrail is on. If there is no handrail, you can use both crutches. Using only crutches is the same as the railing method but maintaining your balance can be difficult. The crutch-only method is not " "recommended until you have become very good at crutch walking. Be sure to only take one step at a time. A simple rule to remember for crutches when navigating stairs or curbs: up with the  good  leg and down with the  bad .  Going Up Stairs or Curbs  Walk close to the first step with the crutches slightly behind you. Push down on the handrail and the crutch and step up with the \"good\" leg. You may have to make a short hop to do this if you are not allowed to place any weight on the injured leg. Lean forward and bring the injured leg and the crutch up beside the \"good\" leg. Repeat this until you have climbed up all of the steps. Remember, the \"good\" leg goes up first and the crutches move with the injured leg. The person helping you should stand behind and to your side that is away from the railing.  Going Down Stairs or Curbs  Walk to the edge of the first step. While standing and balancing on the  good  leg, place both crutches in one hand and then down on the step below. Be sure to support your weight by leaning on the crutches and handrail. Bring the injured leg forward, then the \"good\" leg down to the same step as the crutches. Remember crutches go down first with the injured leg. The person helping you should  front and to your side that is away from the railing.  Sitting Method for Navigating Stairs  A safer way to get up and down stairs is to sit down. To go up steps, sit down on the second or third step. Push with your  good  leg below while pushing up with both arms on the step above to move your bottom to the next step. When you get to the top of the stairs you may need to use the  scooting  method described later to get back up. To go down steps you first need to lower yourself to the floor near the top of the steps. Once seated, support yourself with your  good  leg on the second to next step below, and push with both arms on the step where you are sitting to move your bottom down to the next " "step. When you reach the bottom, pull yourself up to standing position using your crutches. It is helpful if someone can move your crutches and assist you in getting up and down. With practice it may be possible to manage on your own.  If You Start To Fall  If you start to fall and cannot get your balance, throw the crutches away from you. Try to fall onto your  good  side away from your injury and then break your fall with your arms. If uninjured, you can usually get back up by moving into a sitting position and scooting to a chair. Push with your arms and hands on the chair seat while pushing with the \"good\" leg to get up into the chair. You should not attempt to get back up if you are having significant pain. Call for assistance or dial 911 for an ambulance if necessary.  Safety Tips for Crutch Walking   At first you may want to wear a training belt (or a strong regular belt) so others can assist you.   Do not use crutches if you feel dizzy or drowsy.   Be careful on slick or wet surfaces like a kitchen or bathroom floor, snow, ice, or rainy conditions.   Temporarily remove pets, throw rugs or other items from your home that might trip you.   Do not hop around while holding onto furniture.   Wear sneakers or rubber soled shoes that will not easily slip or come off.   Be careful on ramps or slopes as they can be harder to walk up or down   Do not remove any parts from your crutches especially the padding or rubber traction footings. Replace padding or footings that become damaged immediately.   It is important to use your crutches correctly. If you feel any numbness or tingling in your arms, you are probably using the crutches incorrectly.  Helpful Hints   A bedside toilet or toilet riser may be helpful.   Always allow for extra time to get around. Children in school should be given permission to leave class early to avoid crowds when changing classes.   Elevate the injured leg when sitting.   Use a backpack to " carry books or waist pouch to carry other items so that both hands are free.   Call your healthcare provider if you have any questions or concerns.

## 2019-03-08 NOTE — ANESTHESIA POSTPROCEDURE EVALUATION
Patient: Ilana Shin    Procedure(s):  RIGHT KNEE  ARTHROSCOPIC, CHONDROPLASTY OF THE MEDIAL CONDRA,  PARTIAL  LATERAL MENISCECTOMY    Diagnosis:RIGHT KNEE LATERAL MENISCUS TEAR  Diagnosis Additional Information: No value filed.    Anesthesia Type:  General, LMA    Note:  Anesthesia Post Evaluation    Patient location during evaluation: Bedside  Patient participation: Able to fully participate in evaluation  Level of consciousness: awake and alert  Pain management: adequate  Airway patency: patent  Cardiovascular status: acceptable  Respiratory status: acceptable  Hydration status: acceptable  PONV: none             Last vitals:  Vitals:    03/08/19 0830 03/08/19 0845 03/08/19 0900   BP: 139/72 152/73 149/73   Pulse:  50    Resp: 16 16 16   Temp: 35.8  C (96.4  F) 35.7  C (96.3  F) 35.8  C (96.4  F)   SpO2: 100% 100% 99%         Electronically Signed By: Deshawn Alvarez MD  March 8, 2019  9:12 AM

## 2019-03-09 NOTE — OP NOTE
Procedure Date: 03/08/2019      PREOPERATIVE DIAGNOSIS:  Right knee anterior horn lateral meniscus tear.      POSTOPERATIVE DIAGNOSES:     1.  Right knee anterior horn lateral meniscus tear.   2.  Loose chondral flap with of the medial femoral condyle.      PROCEDURES:     1.  Right knee arthroscopic medial femoral condyle chondroplasty.   2.  Partial lateral meniscectomy.      SURGEON:  Vinay Laird MD.       ASSISTANT:  None.      ANESTHESIA:  General.      ESTIMATED BLOOD LOSS:  Less than 5 mL.      INTRAOPERATIVE COMPLICATIONS:  None apparent.      OPERATIVE INDICATIONS:  The patient developed a relatively acute onset of lateral knee pain with some mechanical type symptoms.  An MRI showed an anterior horn lateral meniscus flap-type tear.  Given her mechanical symptoms, it was felt she would benefit from arthroscopic partial lateral meniscectomy.  She expressed good understanding of the risks and elected to proceed.      DESCRIPTION OF PROCEDURE:  The patient was identified in the preoperative holding area and the operative site was marked.  Consent was again reviewed and all questions were answered.  She was taken to the operating room, positioned supine on to the operative table and placed under general anesthesia with the right lower extremity prepped and draped in the usual sterile fashion.  Preoperative antibiotics administered.  A timeout called to ensure the correct operative site and procedure, and the tourniquet inflated to 300 mmHg.  The arthroscope was introduced through a standard anterolateral viewing portal and an anteromedial working portal was created with needle localization.  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  Further distal and medial on the trochlea, there were areas of up to grade IV chondral wear in a relatively focal area.  Medial femoral condyle showed a loose  chondral flap at the point where weightbearing occurs in full extension.  With probing, this was moderately unstable.  The medial meniscus was probed and noted be intact throughout.  The medial tibial plateau was without chondral wear.  The ACL and PCL were noted to be intact.  The lateral femoral condyle and lateral tibial plateau showed minimal areas of grade II chondromalacia.  The lateral meniscus was probed and noted be intact at the posterior horn and rut.  There was an inner surface flap-type tear at the anterior horn lateral meniscus.      The mechanical shaver was used to recontour the loose chondral flap of the medial femoral condyle back to a stable surface.  With probing, this is felt to be well stabilized.  The mechanical shaver and basket forceps were used to resect the anterior horn lateral meniscus flap tear.  With probing, this was now felt to be stable.  Arthroscopic instruments were then removed.  The incisions were closed with nylon suture.  Sterile dressings were applied.  The patient was then awoken from general anesthesia and transferred to postanesthesia care in stable condition.         RON MURCIA MD             D: 2019   T: 2019   MT: TERRI      Name:     RANOLD GUIDO   MRN:      [id number]        Account:        [id number]   :      1955           Procedure Date: 2019      Document: [id number]

## 2019-03-10 DIAGNOSIS — E03.9 HYPOTHYROIDISM, UNSPECIFIED TYPE: ICD-10-CM

## 2019-03-10 DIAGNOSIS — I10 BENIGN ESSENTIAL HYPERTENSION: ICD-10-CM

## 2019-03-10 NOTE — TELEPHONE ENCOUNTER
"Requested Prescriptions   Pending Prescriptions Disp Refills     losartan (COZAAR) 100 MG tablet [Pharmacy Med Name: LOSARTAN 100MG   TAB] 90 tablet 0    Last Written Prescription Date:  11/16/2018  Last Fill Quantity: 90,  # refills: 0   Last office visit: 3/6/2019 with prescribing provider:  3/6/2019   Future Office Visit:     Sig: TAKE 1 TABLET BY MOUTH ONCE DAILY    Angiotensin-II Receptors Failed - 3/10/2019  5:05 PM       Failed - Blood pressure under 140/90 in past 12 months    BP Readings from Last 3 Encounters:   03/08/19 141/70   03/06/19 132/72   05/07/18 128/68                Passed - Recent (12 mo) or future (30 days) visit within the authorizing provider's specialty    Patient had office visit in the last 12 months or has a visit in the next 30 days with authorizing provider or within the authorizing provider's specialty.  See \"Patient Info\" tab in inbasket, or \"Choose Columns\" in Meds & Orders section of the refill encounter.             Passed - Medication is active on med list       Passed - Patient is age 18 or older       Passed - No active pregnancy on record       Passed - Normal serum creatinine on file in past 12 months    Recent Labs   Lab Test 03/06/19  1347   CR 1.02            Passed - Normal serum potassium on file in past 12 months    Recent Labs   Lab Test 03/06/19  1347   POTASSIUM 4.3                   Passed - No positive pregnancy test in past 12 months        levothyroxine (SYNTHROID/LEVOTHROID) 100 MCG tablet [Pharmacy Med Name: LEVOTHYROXIN 100MCG TAB] 90 tablet 0    Last Written Prescription Date:  11/14/2018  Last Fill Quantity: 90,  # refills: 0   Last office visit: 3/6/2019 with prescribing provider:  3/6/2019   Future Office Visit:     Sig: TAKE 1 TABLET BY MOUTH ONCE DAILY    Thyroid Protocol Passed - 3/10/2019  5:05 PM       Passed - Patient is 12 years or older       Passed - Recent (12 mo) or future (30 days) visit within the authorizing provider's specialty    Patient " "had office visit in the last 12 months or has a visit in the next 30 days with authorizing provider or within the authorizing provider's specialty.  See \"Patient Info\" tab in inbasket, or \"Choose Columns\" in Meds & Orders section of the refill encounter.             Passed - Medication is active on med list       Passed - Normal TSH on file in past 12 months    Recent Labs   Lab Test 03/06/19  1347   TSH 2.06             Passed - No active pregnancy on record    If patient is pregnant or has had a positive pregnancy test, please check TSH.         Passed - No positive pregnancy test in past 12 months    If patient is pregnant or has had a positive pregnancy test, please check TSH.            "

## 2019-03-12 RX ORDER — LEVOTHYROXINE SODIUM 100 UG/1
TABLET ORAL
Qty: 90 TABLET | Refills: 3 | Status: SHIPPED | OUTPATIENT
Start: 2019-03-12 | End: 2020-04-08

## 2019-03-12 RX ORDER — LOSARTAN POTASSIUM 100 MG/1
TABLET ORAL
Qty: 90 TABLET | Refills: 0 | Status: SHIPPED | OUTPATIENT
Start: 2019-03-12 | End: 2019-06-08

## 2019-03-12 NOTE — TELEPHONE ENCOUNTER
Levothyroxine Prescription approved per [facility].    Routing refill request to provider for review/approval because:  Elevated BP

## 2019-03-19 DIAGNOSIS — G47.00 INSOMNIA, UNSPECIFIED TYPE: ICD-10-CM

## 2019-03-20 ENCOUNTER — HOSPITAL ENCOUNTER (OUTPATIENT)
Dept: ULTRASOUND IMAGING | Facility: CLINIC | Age: 64
Discharge: HOME | End: 2019-03-20
Attending: ORTHOPAEDIC SURGERY | Admitting: ORTHOPAEDIC SURGERY
Payer: COMMERCIAL

## 2019-03-20 DIAGNOSIS — M79.89 CALF SWELLING: ICD-10-CM

## 2019-03-20 DIAGNOSIS — M79.661 RIGHT CALF PAIN: ICD-10-CM

## 2019-03-20 PROCEDURE — 93971 EXTREMITY STUDY: CPT | Mod: RT

## 2019-03-20 RX ORDER — ZOLPIDEM TARTRATE 5 MG/1
TABLET ORAL
Qty: 30 TABLET | Refills: 0 | Status: SHIPPED | OUTPATIENT
Start: 2019-03-20 | End: 2019-04-17

## 2019-03-20 NOTE — TELEPHONE ENCOUNTER
Requested Prescriptions   Pending Prescriptions Disp Refills     zolpidem (AMBIEN) 5 MG tablet [Pharmacy Med Name: ZOLPIDEM 5MG        TAB] 30 tablet 0     Sig: TAKE 1/2 TO 1 (ONE-HALF TO ONE) TABLET BY MOUTH AT BEDTIME AS NEEDED FOR SLEEP    There is no refill protocol information for this order        Last Written Prescription Date:  2/15/19  Last Fill Quantity: 30,  # refills: 0   Last office visit: 3/6/2019 with prescribing provider:  3/6/19   Future Office Visit:

## 2019-04-17 DIAGNOSIS — G47.00 INSOMNIA, UNSPECIFIED TYPE: ICD-10-CM

## 2019-04-17 RX ORDER — ZOLPIDEM TARTRATE 5 MG/1
TABLET ORAL
Qty: 30 TABLET | Refills: 0 | Status: SHIPPED | OUTPATIENT
Start: 2019-04-17 | End: 2019-05-14

## 2019-04-17 NOTE — TELEPHONE ENCOUNTER
ambien      Last Written Prescription Date:  3/20/19  Last Fill Quantity: 30,   # refills: 0  Last Office Visit: 3/6/19  Future Office visit:       Routing refill request to provider for review/approval because:  Drug not on the [facility], [facility] or [facility] refill protocol or controlled substance

## 2019-05-07 ENCOUNTER — E-VISIT (OUTPATIENT)
Dept: INTERNAL MEDICINE | Facility: CLINIC | Age: 64
End: 2019-05-07
Payer: COMMERCIAL

## 2019-05-07 DIAGNOSIS — J01.90 ACUTE BACTERIAL SINUSITIS: Primary | ICD-10-CM

## 2019-05-07 DIAGNOSIS — B96.89 ACUTE BACTERIAL SINUSITIS: Primary | ICD-10-CM

## 2019-05-07 PROCEDURE — 99444 ZZC PHYSICIAN ONLINE EVALUATION & MANAGEMENT SERVICE: CPT | Performed by: INTERNAL MEDICINE

## 2019-06-08 DIAGNOSIS — I10 BENIGN ESSENTIAL HYPERTENSION: ICD-10-CM

## 2019-06-08 NOTE — TELEPHONE ENCOUNTER
"Requested Prescriptions   Pending Prescriptions Disp Refills     losartan (COZAAR) 100 MG tablet [Pharmacy Med Name: LOSARTAN 100MG   TAB] 90 tablet 0     Sig: TAKE 1 TABLET BY MOUTH ONCE DAILY   Last Written Prescription Date:  3/12/2019  Last Fill Quantity: 90,  # refills: 0   Last Office Visit: 3/6/2019   Future Office Visit:         Angiotensin-II Receptors Failed - 6/8/2019  5:05 PM        Failed - Blood pressure under 140/90 in past 12 months     BP Readings from Last 3 Encounters:   03/08/19 141/70   03/06/19 132/72   05/07/18 128/68                 Passed - Recent (12 mo) or future (30 days) visit within the authorizing provider's specialty     Patient had office visit in the last 12 months or has a visit in the next 30 days with authorizing provider or within the authorizing provider's specialty.  See \"Patient Info\" tab in inbasket, or \"Choose Columns\" in Meds & Orders section of the refill encounter.              Passed - Medication is active on med list        Passed - Patient is age 18 or older        Passed - No active pregnancy on record        Passed - Normal serum creatinine on file in past 12 months     Recent Labs   Lab Test 03/06/19  1347   CR 1.02             Passed - Normal serum potassium on file in past 12 months     Recent Labs   Lab Test 03/06/19  1347   POTASSIUM 4.3                    Passed - No positive pregnancy test in past 12 months          "

## 2019-06-10 RX ORDER — LOSARTAN POTASSIUM 100 MG/1
TABLET ORAL
Qty: 90 TABLET | Refills: 0 | Status: SHIPPED | OUTPATIENT
Start: 2019-06-10 | End: 2019-12-02

## 2019-06-10 NOTE — TELEPHONE ENCOUNTER
.Routing refill request to provider for review/approval because:   out of range:  blood pressure

## 2019-07-02 NOTE — OP NOTE
Procedure Date: 03/08/2019      Revised      PREOPERATIVE DIAGNOSIS:  Right knee anterior horn lateral meniscus tear.       POSTOPERATIVE DIAGNOSES:      1.  Right knee anterior horn lateral meniscus tear.   2.  Loose chondral flap with of the medial femoral condyle.       PROCEDURES:      1.  Right knee arthroscopic medial femoral condyle chondroplasty.   2.  Partial lateral meniscectomy, right knee.       SURGEON:  Vinay Laird MD.         ASSISTANT:  None.       ANESTHESIA:  General.       ESTIMATED BLOOD LOSS:  Less than 5 mL.       INTRAOPERATIVE COMPLICATIONS:  None apparent.       OPERATIVE INDICATIONS:  The patient developed a relatively acute onset of lateral knee pain with some mechanical type symptoms.  An MRI showed an anterior horn lateral meniscus flap-type tear.  Given her mechanical symptoms, it was felt she would benefit from arthroscopic partial lateral meniscectomy.  She expressed good understanding of the risks and elected to proceed.       DESCRIPTION OF PROCEDURE:  The patient was identified in the preoperative holding area and the operative site was marked.  Consent was again reviewed and all questions were answered.  She was taken to the operating room, positioned supine on to the operative table and placed under general anesthesia with the right lower extremity prepped and draped in the usual sterile fashion.  Preoperative antibiotics administered.  A timeout called to ensure the correct operative site and procedure, and the tourniquet inflated to 300 mmHg.  The arthroscope was introduced through a standard anterolateral viewing portal and an anteromedial working portal was created with needle localization.  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  Further distal and medial on the trochlea, there were areas of up to grade IV chondral wear in a relatively focal area.  Medial  femoral condyle showed a loose chondral flap at the point where weightbearing occurs in full extension.  With probing, this was moderately unstable.  The medial meniscus was probed and noted be intact throughout.  The medial tibial plateau was without chondral wear.  The ACL and PCL were noted to be intact.  The lateral femoral condyle and lateral tibial plateau showed minimal areas of grade II chondromalacia.  The lateral meniscus was probed and noted be intact at the posterior horn and rut.  There was an inner surface flap-type tear at the anterior horn lateral meniscus.       The mechanical shaver was used to recontour the loose chondral flap of the medial femoral condyle back to a stable surface.  With probing, this is felt to be well stabilized.  The mechanical shaver and basket forceps were used to resect the anterior horn lateral meniscus flap tear.  With probing, this was now felt to be stable.  Arthroscopic instruments were then removed.  The incisions were closed with nylon suture.  Sterile dressings were applied.  The patient was then awoken from general anesthesia and transferred to postanesthesia care in stable condition.      Revised procedure lg 2019                                                                                                RON MURCIA MD             D: 2019   T: 2019   MT: TERRI      Name:     ARNOLD GUIDO   MRN:      [id number]        Account:        [id number]   :      1955           Procedure Date: 2019      Document: [id number]

## 2019-07-09 NOTE — OP NOTE
Procedure Date: 03/08/2019      Revised      PREOPERATIVE DIAGNOSIS: Right knee anterior horn lateral meniscus tear.      POSTOPERATIVE DIAGNOSES:   1. Right knee anterior horn lateral meniscus tear.   2. Loose chondral flap with of the medial femoral condyle.      PROCEDURES:   1. Right knee arthroscopic medial femoral condyle chondroplasty.   2. Partial lateral meniscectomy, right knee.      SURGEON: Vinay Laird MD.      ASSISTANT: None.      ANESTHESIA: General.      ESTIMATED BLOOD LOSS: Less than 5 mL.      INTRAOPERATIVE COMPLICATIONS: None apparent.      OPERATIVE INDICATIONS: The patient developed a relatively acute onset of lateral knee pain with some mechanical type symptoms. An MRI showed an anterior horn lateral meniscus flap-type tear. Given her mechanical symptoms, it was felt she would benefit from arthroscopic partial lateral meniscectomy. She expressed good understanding of the risks and elected to proceed.      DESCRIPTION OF PROCEDURE: The patient was identified in the preoperative holding area and the operative site was marked. Consent was again reviewed and all questions were answered. She was taken to the operating room, positioned supine on to the operative table and placed under general anesthesia with the right lower extremity prepped and draped in the usual sterile fashion. Preoperative antibiotics administered. A timeout called to ensure the correct operative site and procedure, and the tourniquet inflated to 300 mmHg. The arthroscope was introduced through a standard anterolateral viewing portal and an anteromedial working portal was created with needle localization. 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 Further distal and medial on the trochlea, there were areas of up to grade IV chondral wear in a relatively focal area. Medial femoral condyle showed a loose chondral  flap at the point where weightbearing occurs in full extension. With probing, this was moderately unstable. The medial meniscus was probed and noted be intact throughout. The medial tibial plateau was without chondral wear. The ACL and PCL were noted to be intact. The lateral femoral condyle and lateral tibial plateau showed minimal areas of grade II chondromalacia. The lateral meniscus was probed and noted be intact at the posterior horn and rut. There was an inner surface flap-type tear at the anterior horn lateral meniscus.      The mechanical shaver was used to recontour the loose chondral flap of the medial femoral condyle back to a stable surface. With probing, this is felt to be well stabilized. The mechanical shaver and basket forceps were used to resect the anterior horn lateral meniscus flap tear. With probing, this was now felt to be stable. Arthroscopic instruments were then removed. The incisions were closed with nylon suture. Sterile dressings were applied. The patient was then awoken from general anesthesia and transferred to postanesthesia care in stable condition.      Revised procedure lg 2019   Reformatted 2019  [facility]                        RON MURCIA MD             D: 2019   T: 2019   MT: TERRI      Name:     ARNOLD GUIDO   MRN:      [id number]        Account:        [id number]   :      1955           Procedure Date: 2019      Document: [id number]

## 2019-08-08 ENCOUNTER — OFFICE VISIT (OUTPATIENT)
Dept: INTERNAL MEDICINE | Facility: CLINIC | Age: 64
End: 2019-08-08
Payer: COMMERCIAL

## 2019-08-08 ENCOUNTER — ANCILLARY PROCEDURE (OUTPATIENT)
Dept: GENERAL RADIOLOGY | Facility: CLINIC | Age: 64
End: 2019-08-08
Attending: INTERNAL MEDICINE
Payer: COMMERCIAL

## 2019-08-08 VITALS
BODY MASS INDEX: 28.53 KG/M2 | RESPIRATION RATE: 16 BRPM | SYSTOLIC BLOOD PRESSURE: 108 MMHG | TEMPERATURE: 98 F | WEIGHT: 161 LBS | OXYGEN SATURATION: 100 % | HEART RATE: 50 BPM | HEIGHT: 63 IN | DIASTOLIC BLOOD PRESSURE: 70 MMHG

## 2019-08-08 DIAGNOSIS — R06.09 DYSPNEA ON EXERTION: ICD-10-CM

## 2019-08-08 DIAGNOSIS — G47.9 SLEEP TROUBLE: ICD-10-CM

## 2019-08-08 DIAGNOSIS — R07.89 CHEST PRESSURE: ICD-10-CM

## 2019-08-08 DIAGNOSIS — R06.09 DYSPNEA ON EXERTION: Primary | ICD-10-CM

## 2019-08-08 LAB
ALBUMIN SERPL-MCNC: 3.8 G/DL (ref 3.4–5)
ALP SERPL-CCNC: 62 U/L (ref 40–150)
ALT SERPL W P-5'-P-CCNC: 41 U/L (ref 0–50)
ANION GAP SERPL CALCULATED.3IONS-SCNC: 5 MMOL/L (ref 3–14)
AST SERPL W P-5'-P-CCNC: 24 U/L (ref 0–45)
BILIRUB SERPL-MCNC: 0.7 MG/DL (ref 0.2–1.3)
BUN SERPL-MCNC: 16 MG/DL (ref 7–30)
CALCIUM SERPL-MCNC: 9.1 MG/DL (ref 8.5–10.1)
CHLORIDE SERPL-SCNC: 109 MMOL/L (ref 94–109)
CO2 SERPL-SCNC: 26 MMOL/L (ref 20–32)
CREAT SERPL-MCNC: 0.95 MG/DL (ref 0.52–1.04)
ERYTHROCYTE [DISTWIDTH] IN BLOOD BY AUTOMATED COUNT: 13.2 % (ref 10–15)
GFR SERPL CREATININE-BSD FRML MDRD: 63 ML/MIN/{1.73_M2}
GLUCOSE SERPL-MCNC: 93 MG/DL (ref 70–99)
HCT VFR BLD AUTO: 39.2 % (ref 35–47)
HGB BLD-MCNC: 12.6 G/DL (ref 11.7–15.7)
MCH RBC QN AUTO: 29.4 PG (ref 26.5–33)
MCHC RBC AUTO-ENTMCNC: 32.1 G/DL (ref 31.5–36.5)
MCV RBC AUTO: 91 FL (ref 78–100)
PLATELET # BLD AUTO: 198 10E9/L (ref 150–450)
POTASSIUM SERPL-SCNC: 4.8 MMOL/L (ref 3.4–5.3)
PROT SERPL-MCNC: 6.9 G/DL (ref 6.8–8.8)
RBC # BLD AUTO: 4.29 10E12/L (ref 3.8–5.2)
SODIUM SERPL-SCNC: 140 MMOL/L (ref 133–144)
TROPONIN I SERPL-MCNC: <0.015 UG/L (ref 0–0.04)
WBC # BLD AUTO: 5.4 10E9/L (ref 4–11)

## 2019-08-08 PROCEDURE — 80053 COMPREHEN METABOLIC PANEL: CPT | Performed by: INTERNAL MEDICINE

## 2019-08-08 PROCEDURE — 84484 ASSAY OF TROPONIN QUANT: CPT | Performed by: INTERNAL MEDICINE

## 2019-08-08 PROCEDURE — 93000 ELECTROCARDIOGRAM COMPLETE: CPT | Performed by: INTERNAL MEDICINE

## 2019-08-08 PROCEDURE — 85027 COMPLETE CBC AUTOMATED: CPT | Performed by: INTERNAL MEDICINE

## 2019-08-08 PROCEDURE — 71046 X-RAY EXAM CHEST 2 VIEWS: CPT

## 2019-08-08 PROCEDURE — 36415 COLL VENOUS BLD VENIPUNCTURE: CPT | Performed by: INTERNAL MEDICINE

## 2019-08-08 PROCEDURE — 99214 OFFICE O/P EST MOD 30 MIN: CPT | Performed by: INTERNAL MEDICINE

## 2019-08-08 RX ORDER — ZOLPIDEM TARTRATE 5 MG/1
2.5 TABLET ORAL
Qty: 15 TABLET | Refills: 5 | Status: SHIPPED | OUTPATIENT
Start: 2019-08-08 | End: 2020-04-08 | Stop reason: ALTCHOICE

## 2019-08-08 ASSESSMENT — MIFFLIN-ST. JEOR: SCORE: 1241.48

## 2019-08-08 NOTE — PROGRESS NOTES
"  SUBJECTIVE:                                                      HPI: Ilana Shin is a pleasant 63 year old female who presents with shortness of breath and chest pressure:    - both started several days ago  - shortness of breath occurs with exertion only  - chest pressure is central in location, without radiation; occurs with activity, relieved with rest     - no palpitations, lightheadedness, presyncope  - no cough or wheezing  - no nausea, vomiting, or sweating  - no fevers or chills    PMH significant for HTN, well controlled on losartan 100 mg daily, and CKD stage III.     Of note, patient recently stopped her Ambien \"cold turkey.\" As expected, patient has not been sleeping as well since.     The medication, allergy, and problem lists have been reviewed and updated as appropriate.     OBJECTIVE:                                                      /70   Pulse 50   Temp 98  F (36.7  C) (Oral)   Resp 16   Ht 1.588 m (5' 2.5\")   Wt 73 kg (161 lb)   LMP  (LMP Unknown)   SpO2 100%   Breastfeeding? No   BMI 28.98 kg/m    Constitutional: well-appearing  Respiratory: normal respiratory effort; clear to auscultation bilaterally  Cardiovascular: regular rate and rhythm; no edema  Gastrointestinal: soft, non-tender, non-distended, and bowel sounds present; no organomegaly or masses   Musculoskeletal: normal gait and station  Psych: normal judgment and insight; normal mood and affect; recent and remote memory intact    ASSESSMENT/PLAN:                                                      (R06.09) Dyspnea on exertion  (primary encounter diagnosis)  (R07.89) Chest pressure  (G47.9) Sleep trouble  Comment: etiology unclear, but symptoms concerning for ACS; Ambien withdrawal can certainly be contributing..  Plan:    - EKG today.   - CXR, CBC, CMP, troponin today.   - recommend weaning off of Ambien rather than acute cessation; new prescription provided.    The instructions on the AVS were discussed and " explained to the patient. Patient expressed understanding of instructions.    (Chart documentation was completed, in part, with [employer] voice-recognition software. Even though reviewed, some grammatical, spelling, and word errors may remain.)    Prema Rodgers MD   [address] [address]  T: [phone], F: [phone]

## 2019-10-02 ENCOUNTER — HEALTH MAINTENANCE LETTER (OUTPATIENT)
Age: 64
End: 2019-10-02

## 2019-11-07 NOTE — PROGRESS NOTES
SUBJECTIVE:                                                      HPI: Ilana Shin is a pleasant 61 year old female who presents to [facility] care:    Her blood pressure is noted to be elevated today, even on repeat.    She has never been diagnosed with high blood pressure, but review of Epic demonstrates multiple elevated readings.    EKG in June, 2016 demonstrated NSR, no evidence of LVH.    She does endorse frequent headaches.     Denies vision changes, chest pain, palpitations, shortness of breath, or lightheadedness.    Additionally, patient's TSH in June was significantly suppressed. Her levothyroxine dose was not adjusted in response to this.    Finally, patient has a history of gastric bypass surgery ([facility]). She is due for follow-up gastric bypass labs including CBC, iron studies, B12 level, and vitamin D level.    Past Medical History:   Diagnosis Date     Fibromyalgia      Hypothyroidism      Persistent insomnia      Re: FM: not on medication for this.  Re: hypothyroidism: see above.  Re: Persistent insomnia: uses Ambien 5 mg most nights.    Past Surgical History:   Procedure Laterality Date     BUNIONECTOMY Right 8/2014     BUNIONECTOMY LADONNA, REPAIR HAMMER TOE(S), COMBINED  09/2003    Left foot     C LAPAROSCOPIC [facility] GASTROJE[facility]UNOSTOMY  2006     CHOLECYSTECTOMY, OPEN  1994     RELEASE CARPAL TUNNEL BILATERAL  1999     TUBAL LIGATION  1984    age 29     Family History   Problem Relation Age of Onset     Hypertension Mother      Type 2 Diabetes Father      Hypertension Father      Breast Cancer Paternal Grandmother      Breast Cancer Sister 45     Myocardial Infarction Sister      Lung Cancer Brother      Hypertension Sister      x2     CEREBROVASCULAR DISEASE No family hx of      Coronary Artery Disease Early Onset No family hx of      Colon Cancer No family hx of      Ovarian Cancer No family hx of      Occupational History           Social History Main Topics     Smoking  status: Former Smoker     Packs/day: 1.00     Years: 20.00     Types: Cigarettes     Quit date: 1/1/2002     Smokeless tobacco: Never Used     Alcohol use No     Drug use: No     Sexual activity: No     Social History Narrative    .    2 adult kids.    4 grand kids.    No formal exercise.      Allergies   Allergen Reactions     Codeine Sulfate GI Disturbance     Current Outpatient Prescriptions   Medication Sig     levothyroxine (SYNTHROID/LEVOTHROID) 100 MCG tablet Take 1 tablet (100 mcg) by mouth daily     losartan (COZAAR) 100 MG tablet Take 1 tablet (100 mg) by mouth daily     levothyroxine (SYNTHROID/LEVOTHROID) 125 MCG tablet TAKE 1 TABLET BY MOUTH ONCE DAILY     zolpidem (AMBIEN) 5 MG tablet TAKE 1 TABLET BY MOUTH NIGHTLY AS NEEDED     fluticasone (FLONASE) 50 MCG/ACT spray USE ONE TO TWO SPRAYS IN EACH NOSTRIL EVERY EVENING     Naproxen Sodium (ALEVE PO) Reported on 4/28/2017     albuterol (PROAIR HFA, PROVENTIL HFA, VENTOLIN HFA) 108 (90 BASE) MCG/ACT inhaler Inhale 1-2 puffs into the lungs every 4 hours as needed for shortness of breath / dyspnea     cyanocolbalamin (VITAMIN  B-12) 500 MCG tablet Take 2 tablets by mouth daily Chews     Cholecalciferol (VITAMIN D) 2000 UNITS CAPS Take 1 capsule by mouth daily     Immunization History   Administered Date(s) Administered     Influenza Intranasal Vaccine 4 valent 10/01/2015     Tdap (Adacel,Boostrix) 09/22/2011     OBJECTIVE:                                                      /78  Pulse (!) 46  Temp 97.8  F (36.6  C) (Oral)  Wt 169 lb 3.2 oz (76.7 kg)  LMP  (LMP Unknown)  SpO2 98%  BMI 29.97 kg/m2  Constitutional: well-appearing  Respiratory: normal respiratory effort; clear to auscultation bilaterally  Cardiovascular: mildly bradycardic, but regular; no edema  Musculoskeletal: normal gait and station  Psych: normal judgment and insight; normal mood and affect; recent and remote memory intact; oriented to time, place, and  person    PREVENTATIVE HEALTH                                                      BMI: overweight  Mammogram: DUE  Pap: last pap performed 12/16; report reviewed and was normal; repeat due in 12/21  Colonoscopy: last performed 2099; repeat DUE  Dexa: DUE  Screening HCV: completed  Screening cholesterol: up to date and within normal limits   Screening diabetes: up to date and within normal limits   STD testing: no risk factors present  Depression screening: PHQ-2 assessment completed and reviewed - no intervention indicated at this time  Alcohol misuse screening: alcohol use reviewed - no intervention indicated at this time  Immunizations: reviewed; shingles vaccine DUE    ASSESSMENT/PLAN:                                                       (Z76.89) Encounter to establish care  (primary encounter diagnosis)  Comment: PMH, PSH, FH, SH, medications, allergies, immunizations, and preventative health measures reviewed.   Plan: see below for plan.    (Z23) Need for vaccination  Plan: shingles vaccine today.    (Z12.11) Screen for colon cancer  Comment: patient declines colonoscopy at this time.  Plan: FIT test ordered - patient , complete at home, and mail in.     (Z12.39) Screening for breast cancer  Plan: mammogram ordered - patient to schedule.    (Z78.0) Asymptomatic menopause  Plan: DEXA ordered - patient to schedule.    (E03.9) Hypothyroidism, unspecified type  Comment: recent TSH overly suppressed.  Plan:    - STOP levothyroxine 125  g daily.   - START levothyroxine 100  g daily   - follow-up TFTs in ~6 weeks.    (Z98.84) S/P gastric bypass  Plan: CBC, iron studies, vitamin B12 level, and vitamin D level with above TFTs.    (I10) Benign essential hypertension  Comment: new diagnosis this visit.  Plan:    - Patient encouraged to adhere to a heart healthy diet and exercise regularly.   - START losartan 100 mg daily.   - follow-up in 2-4 weeks for blood pressure check and BMP.    The instructions on the AVS  were discussed and explained to the patient. Patient expressed understanding of instructions.    A total of 30 minutes were spent face-to-face with this patient during this encounter and over half of that time was spent on counseling and coordination of care re: above diagnoses and plans of care.     (Chart documentation was completed, in part, with [employer] voice-recognition software. Even though reviewed, some grammatical, spelling, and word errors may remain.)    Prema Rodgers MD   [address] [address]  T: [phone], F: [phone]       Home

## 2019-12-02 DIAGNOSIS — I10 BENIGN ESSENTIAL HYPERTENSION: ICD-10-CM

## 2019-12-03 RX ORDER — LOSARTAN POTASSIUM 100 MG/1
TABLET ORAL
Qty: 90 TABLET | Refills: 2 | Status: SHIPPED | OUTPATIENT
Start: 2019-12-03 | End: 2020-09-09

## 2019-12-03 NOTE — TELEPHONE ENCOUNTER
"Requested Prescriptions   Pending Prescriptions Disp Refills     losartan (COZAAR) 100 MG tablet [Pharmacy Med Name: LOSARTAN 100MG   TAB] 90 tablet 0     Sig: TAKE 1 TABLET BY MOUTH ONCE DAILY       Angiotensin-II Receptors Passed - 12/2/2019  7:53 PM        Passed - Last blood pressure under 140/90 in past 12 months     BP Readings from Last 3 Encounters:   08/08/19 108/70   03/08/19 141/70   03/06/19 132/72                 Passed - Recent (12 mo) or future (30 days) visit within the authorizing provider's specialty     Patient has had an office visit with the authorizing provider or a provider within the authorizing providers department within the previous 12 mos or has a future within next 30 days. See \"Patient Info\" tab in inbasket, or \"Choose Columns\" in Meds & Orders section of the refill encounter.              Passed - Medication is active on med list        Passed - Patient is age 18 or older        Passed - No active pregnancy on record        Passed - Normal serum creatinine on file in past 12 months     Recent Labs   Lab Test 08/08/19  1427   CR 0.95             Passed - Normal serum potassium on file in past 12 months     Recent Labs   Lab Test 08/08/19  1427   POTASSIUM 4.8                    Passed - No positive pregnancy test in past 12 months          "

## 2019-12-15 ENCOUNTER — HEALTH MAINTENANCE LETTER (OUTPATIENT)
Age: 64
End: 2019-12-15

## 2019-12-18 ENCOUNTER — E-VISIT (OUTPATIENT)
Dept: INTERNAL MEDICINE | Facility: CLINIC | Age: 64
End: 2019-12-18
Payer: COMMERCIAL

## 2019-12-18 DIAGNOSIS — J01.90 ACUTE BACTERIAL SINUSITIS: Primary | ICD-10-CM

## 2019-12-18 DIAGNOSIS — B96.89 ACUTE BACTERIAL SINUSITIS: Primary | ICD-10-CM

## 2019-12-18 PROCEDURE — 99444 ZZC PHYSICIAN ONLINE EVALUATION & MANAGEMENT SERVICE: CPT | Performed by: INTERNAL MEDICINE

## 2019-12-18 RX ORDER — FLUTICASONE PROPIONATE 50 MCG
2 SPRAY, SUSPENSION (ML) NASAL DAILY
Qty: 16 G | Refills: 0 | Status: SHIPPED | OUTPATIENT
Start: 2019-12-18 | End: 2022-10-11

## 2020-04-04 ENCOUNTER — MYC MEDICAL ADVICE (OUTPATIENT)
Dept: INTERNAL MEDICINE | Facility: CLINIC | Age: 65
End: 2020-04-04

## 2020-04-06 NOTE — TELEPHONE ENCOUNTER
Please see patient mychart message.     Do you want her to complete an evisit/phone visit.     Last evisit 12/2019.   Last face to face 8/2019

## 2020-04-08 ENCOUNTER — VIRTUAL VISIT (OUTPATIENT)
Dept: INTERNAL MEDICINE | Facility: CLINIC | Age: 65
End: 2020-04-08
Payer: COMMERCIAL

## 2020-04-08 VITALS — WEIGHT: 168 LBS | BODY MASS INDEX: 30.24 KG/M2

## 2020-04-08 DIAGNOSIS — E03.9 HYPOTHYROIDISM, UNSPECIFIED TYPE: ICD-10-CM

## 2020-04-08 DIAGNOSIS — G47.00 INSOMNIA, UNSPECIFIED TYPE: Primary | ICD-10-CM

## 2020-04-08 DIAGNOSIS — R35.1 NOCTURIA: ICD-10-CM

## 2020-04-08 PROCEDURE — 99214 OFFICE O/P EST MOD 30 MIN: CPT | Mod: TEL | Performed by: INTERNAL MEDICINE

## 2020-04-08 RX ORDER — LEVOTHYROXINE SODIUM 100 UG/1
100 TABLET ORAL DAILY
Qty: 90 TABLET | Refills: 0 | Status: SHIPPED | OUTPATIENT
Start: 2020-04-08 | End: 2020-07-30

## 2020-04-08 NOTE — PROGRESS NOTES
"Subjective     Ilana Shin is a 64 year old female who is being evaluated via a billable telephone visit.      The patient has been notified of following:     \"This telephone visit will be conducted via a call between you and your physician/provider. We have found that certain health care needs can be provided without the need for a physical exam.  This service lets us provide the care you need with a short phone conversation.  If a prescription is necessary we can send it directly to your pharmacy.  If lab work is needed we can place an order for that and you can then stop by our lab to have the test done at a later time.    Telephone visits are billed at different rates depending on your insurance coverage. During this emergency period, for some insurers they may be billed the same as an in-person visit.  Please reach out to your insurance provider with any questions.    If during the course of the call the physician/provider feels a telephone visit is not appropriate, you will not be charged for this service.\"    Patient has given verbal consent for Telephone visit?  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 However, patient is no longer the care taker of the children. Patient would like to resume Ambien.        ALLERGIES  Codeine sulfate    Due to the current impact of the Covid19 virus and recommendations by [facility] administration, CDC, etc to limit  clinic visits and pt exposure risk, was recommend pt proceed with virtual phone visit to address acute/stable  medical needs with plan to defer other non-acute health maintenance issues/exam to a future date when less self-isolation is required.    I have reviewed the nursing note as documented above.   See below for other information/data  and my personal notes capturing the substance of my conversation with the patient.       " HPI:   Patient normally followed by Dr Rodgers who is out of clinic this week so pt seen in cross coverage.  History of chronic insomnia.  Had used zolpidem for many years.  This medication was then discontinued by Dr Rodgers last summer.  Patient states per the reason with that she was taking care of her grandchildren at that time but is no longer doing so.  Patient goes to bed about 9 or 10 PM.  Able to fall asleep fine but will then wake up 3 to 4 hours later and is often up for a couple hours before being on the fall asleep again.  This occurs most nights.  Patient has 2 cans of soda pop per day in the morning but done by 1 PM in the afternoon.  Patient is practicing good sleep hygiene.  She is exercising by walking 30 minutes daily.  She does have nocturia 2 or 3 times a night.  No dysuria.  Patient states she underwent a sleep study about 15 years ago and was told that she did not have sleep apnea at that time.  No note to review.  No known RLS hx  Prior to using zolpidem, patient had been on amitriptyline for many years.  By patient's memory, this medication also worked well.  She says she has tried trazodone once in the past and had some side effects with the medication but does not remember them specifically and no documentation of this in the electronic chart.  Patient denies anxiety issues or depression.  Weight is reportedly up 7 pounds from last visit.  Weight today is per patient's home scale.  Energy okay.  No cold intolerance or constipation.  Compliant with taking levothyroxine daily.  Last TSH lab normal in March 2018     Additional ROS:   Constitutional, HEENT, Cardiovascular, Pulmonary, GI and , Neuro, MSK and Psych review of systems/symptoms are otherwise negative or unchanged from previous, except as noted above.      ASSESSMENT:   1. Insomnia, unspecified type  Chronic.  More of an issue of staying asleep.  Has been per patient be using long-term Ambien.  Since amitriptyline has worked well for  her in the past without a.m. hangover effects, will have patient retry this.  If not helping or side effects with that, patient will then inform Dr Rodgers. See #3 below in addition  - amitriptyline (ELAVIL) 25 MG tablet; Take 1 tablet (25 mg) by mouth At Bedtime For insomnia  Dispense: 30 tablet; Refill: 1    2. Hypothyroidism, unspecified type  Given limitations with clinic appointments due to coronavirus pandemic, will refill thyroxine for another 90 days.  Future lab monitoring can be addressed when prescription refill due again  - levothyroxine (SYNTHROID/LEVOTHROID) 100 MCG tablet; Take 1 tablet (100 mcg) by mouth daily  Dispense: 90 tablet; Refill: 0    3. Nocturia  Chronic per pt.  Nocturia may also be contributing to sleep issue so patient will make lifestyle changes with discontinuation of caffeine intake and schedule a bathroom break right before bedtime.  If not improving, patient will contact Dr Rodgers to consider medication treatment options  such as Trospium      PLAN:  Amitriptyline 20 mg tablet, 1 tablet daily at bedtime for insomnia.  If not helpful with sleep, patient to inform Dr. Rodgers  Continue other medications.   Try stopping intake of caffeine in the morning to reduce nighttime urination as this can also contribute to insomnia.  Also schedule bathroom break right before going to bed    Phone call contact time  Call Started at 2:51pm  Call Ended at 3:03 pm  Total minutes: 12 min    (Chart documentation was completed, in part, with [employer] voice-recognition software. Even though reviewed, some grammatical, spelling, and word errors may remain.)    Terrell Moore MD  Internal Medicine Department  [facility]

## 2020-04-09 PROBLEM — R35.1 NOCTURIA: Status: ACTIVE | Noted: 2020-04-09

## 2020-04-09 NOTE — PATIENT INSTRUCTIONS
Amitriptyline 20 mg tablet, 1 tablet daily at bedtime for insomnia.  If not helpful with sleep, patient to inform Dr. Rodgers  Continue other medications.   Try stopping intake of caffeine in the morning to reduce nighttime urination as this can also contribute to insomnia.  Also schedule bathroom break right before going to bed

## 2020-04-30 NOTE — NURSING NOTE
"Chief Complaint   Patient presents with     Derm Problem     Mole in the midback for a long time, recently changing in appearence.       Initial /60 (BP Location: Left arm, Patient Position: Chair, Cuff Size: Adult Regular)  Pulse 50  Temp 98.6  F (37  C) (Oral)  Ht 5' 3\" (1.6 m)  Wt 168 lb 1.6 oz (76.2 kg)  LMP  (LMP Unknown)  SpO2 99%  BMI 29.78 kg/m2 Estimated body mass index is 29.78 kg/(m^2) as calculated from the following:    Height as of this encounter: 5' 3\" (1.6 m).    Weight as of this encounter: 168 lb 1.6 oz (76.2 kg).  Medication Reconciliation: complete     Kaminibose MA      " Order in but can't do til mid June please with covid

## 2020-08-10 PROBLEM — R35.1 NOCTURIA: Status: RESOLVED | Noted: 2020-04-09 | Resolved: 2020-08-10

## 2020-08-11 ENCOUNTER — OFFICE VISIT (OUTPATIENT)
Dept: INTERNAL MEDICINE | Facility: CLINIC | Age: 65
End: 2020-08-11
Payer: COMMERCIAL

## 2020-08-11 VITALS
HEIGHT: 63 IN | TEMPERATURE: 97.2 F | BODY MASS INDEX: 30.48 KG/M2 | HEART RATE: 53 BPM | WEIGHT: 172 LBS | SYSTOLIC BLOOD PRESSURE: 132 MMHG | RESPIRATION RATE: 16 BRPM | DIASTOLIC BLOOD PRESSURE: 78 MMHG | OXYGEN SATURATION: 98 %

## 2020-08-11 DIAGNOSIS — Z23 NEED FOR VACCINATION: ICD-10-CM

## 2020-08-11 DIAGNOSIS — Z13.1 SCREENING FOR DIABETES MELLITUS: ICD-10-CM

## 2020-08-11 DIAGNOSIS — Z78.0 ASYMPTOMATIC MENOPAUSE: ICD-10-CM

## 2020-08-11 DIAGNOSIS — Z13.220 SCREENING FOR CHOLESTEROL LEVEL: ICD-10-CM

## 2020-08-11 DIAGNOSIS — E03.9 HYPOTHYROIDISM, UNSPECIFIED TYPE: ICD-10-CM

## 2020-08-11 DIAGNOSIS — Z98.84 S/P GASTRIC BYPASS: ICD-10-CM

## 2020-08-11 DIAGNOSIS — E55.9 VITAMIN D DEFICIENCY: ICD-10-CM

## 2020-08-11 DIAGNOSIS — Z12.11 SPECIAL SCREENING FOR MALIGNANT NEOPLASMS, COLON: ICD-10-CM

## 2020-08-11 DIAGNOSIS — Z13.0 SCREENING FOR BLOOD DISEASE: ICD-10-CM

## 2020-08-11 DIAGNOSIS — Z12.31 ENCOUNTER FOR SCREENING MAMMOGRAM FOR BREAST CANCER: ICD-10-CM

## 2020-08-11 DIAGNOSIS — N18.30 CKD (CHRONIC KIDNEY DISEASE), STAGE III (H): ICD-10-CM

## 2020-08-11 DIAGNOSIS — Z00.00 WELCOME TO MEDICARE PREVENTIVE VISIT: Primary | ICD-10-CM

## 2020-08-11 LAB
ALBUMIN SERPL-MCNC: 3.8 G/DL (ref 3.4–5)
ALP SERPL-CCNC: 74 U/L (ref 40–150)
ALT SERPL W P-5'-P-CCNC: 21 U/L (ref 0–50)
ANION GAP SERPL CALCULATED.3IONS-SCNC: 6 MMOL/L (ref 3–14)
AST SERPL W P-5'-P-CCNC: 21 U/L (ref 0–45)
BILIRUB SERPL-MCNC: 0.8 MG/DL (ref 0.2–1.3)
BUN SERPL-MCNC: 10 MG/DL (ref 7–30)
CALCIUM SERPL-MCNC: 9.8 MG/DL (ref 8.5–10.1)
CHLORIDE SERPL-SCNC: 110 MMOL/L (ref 94–109)
CHOLEST SERPL-MCNC: 197 MG/DL
CO2 SERPL-SCNC: 25 MMOL/L (ref 20–32)
CREAT SERPL-MCNC: 0.96 MG/DL (ref 0.52–1.04)
ERYTHROCYTE [DISTWIDTH] IN BLOOD BY AUTOMATED COUNT: 13.4 % (ref 10–15)
FERRITIN SERPL-MCNC: 36 NG/ML (ref 8–252)
GFR SERPL CREATININE-BSD FRML MDRD: 61 ML/MIN/{1.73_M2}
GLUCOSE SERPL-MCNC: 92 MG/DL (ref 70–99)
HCT VFR BLD AUTO: 39 % (ref 35–47)
HDLC SERPL-MCNC: 77 MG/DL
HGB BLD-MCNC: 12.2 G/DL (ref 11.7–15.7)
IRON SATN MFR SERPL: 23 % (ref 15–46)
IRON SERPL-MCNC: 84 UG/DL (ref 35–180)
LDLC SERPL CALC-MCNC: 108 MG/DL
MCH RBC QN AUTO: 29.3 PG (ref 26.5–33)
MCHC RBC AUTO-ENTMCNC: 31.3 G/DL (ref 31.5–36.5)
MCV RBC AUTO: 94 FL (ref 78–100)
NONHDLC SERPL-MCNC: 120 MG/DL
PLATELET # BLD AUTO: 188 10E9/L (ref 150–450)
POTASSIUM SERPL-SCNC: 4.2 MMOL/L (ref 3.4–5.3)
PROT SERPL-MCNC: 7.4 G/DL (ref 6.8–8.8)
RBC # BLD AUTO: 4.16 10E12/L (ref 3.8–5.2)
SODIUM SERPL-SCNC: 141 MMOL/L (ref 133–144)
TIBC SERPL-MCNC: 361 UG/DL (ref 240–430)
TRIGL SERPL-MCNC: 60 MG/DL
TSH SERPL DL<=0.005 MIU/L-ACNC: 1.33 MU/L (ref 0.4–4)
VIT B12 SERPL-MCNC: 416 PG/ML (ref 193–986)
WBC # BLD AUTO: 3.6 10E9/L (ref 4–11)

## 2020-08-11 PROCEDURE — 85027 COMPLETE CBC AUTOMATED: CPT | Performed by: INTERNAL MEDICINE

## 2020-08-11 PROCEDURE — 83550 IRON BINDING TEST: CPT | Performed by: INTERNAL MEDICINE

## 2020-08-11 PROCEDURE — 82607 VITAMIN B-12: CPT | Performed by: INTERNAL MEDICINE

## 2020-08-11 PROCEDURE — G0403 EKG FOR INITIAL PREVENT EXAM: HCPCS | Performed by: INTERNAL MEDICINE

## 2020-08-11 PROCEDURE — 82306 VITAMIN D 25 HYDROXY: CPT | Performed by: INTERNAL MEDICINE

## 2020-08-11 PROCEDURE — 80061 LIPID PANEL: CPT | Performed by: INTERNAL MEDICINE

## 2020-08-11 PROCEDURE — G0402 INITIAL PREVENTIVE EXAM: HCPCS | Performed by: INTERNAL MEDICINE

## 2020-08-11 PROCEDURE — 80053 COMPREHEN METABOLIC PANEL: CPT | Performed by: INTERNAL MEDICINE

## 2020-08-11 PROCEDURE — 82728 ASSAY OF FERRITIN: CPT | Performed by: INTERNAL MEDICINE

## 2020-08-11 PROCEDURE — 90732 PPSV23 VACC 2 YRS+ SUBQ/IM: CPT | Performed by: INTERNAL MEDICINE

## 2020-08-11 PROCEDURE — 84443 ASSAY THYROID STIM HORMONE: CPT | Performed by: INTERNAL MEDICINE

## 2020-08-11 PROCEDURE — 83540 ASSAY OF IRON: CPT | Performed by: INTERNAL MEDICINE

## 2020-08-11 PROCEDURE — 36415 COLL VENOUS BLD VENIPUNCTURE: CPT | Performed by: INTERNAL MEDICINE

## 2020-08-11 PROCEDURE — G0009 ADMIN PNEUMOCOCCAL VACCINE: HCPCS | Performed by: INTERNAL MEDICINE

## 2020-08-11 ASSESSMENT — MIFFLIN-ST. JEOR: SCORE: 1294.32

## 2020-08-11 NOTE — PROGRESS NOTES
SUBJECTIVE                                                      HPI: Ilana Shin is a pleasant 64 year old female who presents for her Welcome to Medicare visit:    PMH, PSH, FH, SH, medications, allergies, immunizations, preventative health, and HRA reviewed.     Past Medical History:   Diagnosis Date     Benign essential hypertension      Bradycardia      CKD (chronic kidney disease), stage III (H)      Fibromyalgia      Hypothyroidism      Moderate aortic stenosis      Obesity (BMI 30-39.9)      Osteopenia      Past Surgical History:   Procedure Laterality Date     ARTHROSCOPY KNEE WITH LATERAL MENISCECTOMY Right 3/8/2019    Procedure: RIGHT KNEE  ARTHROSCOPIC, CHONDROPLASTY OF THE MEDIAL CONDRA,  PARTIAL  LATERAL MENISCECTOMY;  Surgeon: Vinay Laird MD;  Location: [facility]     BUNIONECTOMY Right 2014     BUNIONECTOMY LADONNA, REPAIR HAMMER TOE(S), COMBINED  2003    Left foot     C LAPAROSCOPIC JASS EN Y GASTROJEJUNOSTOMY       CHOLECYSTECTOMY, OPEN  1994     RELEASE CARPAL TUNNEL BILATERAL       TUBAL LIGATION  1984    age 29     Family History   Problem Relation Age of Onset     Hypertension Mother      Deep Vein Thrombosis (DVT) Mother         x2 - both provoked (trauma, surgery)     Diabetes Type 2  Father      Hypertension Father      Breast Cancer Paternal Grandmother      Breast Cancer Sister 45     Myocardial Infarction Sister 50     Deep Vein Thrombosis (DVT) Sister         provoked (surgery)     Lung Cancer Brother         smoker     Hypertension Sister         x2     Cerebrovascular Disease No family hx of      Colon Cancer No family hx of      Ovarian Cancer No family hx of      Social History     Occupational History     Occupation: Retired - instrument tech   Tobacco Use     Smoking status: Former Smoker     Packs/day: 1.00     Years: 20.00     Pack years: 20.00     Types: Cigarettes     Last attempt to quit: 2002     Years since quittin.6     Smokeless tobacco: Never Used    Substance and Sexual Activity     Alcohol use: No     Drug use: No     Sexual activity: Never   Social History Narrative    .    2 adult kids.    5 grand kids.    Walks regularly.      Allergies   Allergen Reactions     Codeine Sulfate GI Disturbance     Current Outpatient Medications   Medication Sig     acetaminophen (TYLENOL) 325 MG tablet Take 2 tablets (650 mg) by mouth every 4 hours as needed for mild pain     amitriptyline (ELAVIL) 50 MG tablet Take 1 tablet (50 mg) by mouth At Bedtime     fluticasone (FLONASE) 50 MCG/ACT nasal spray Spray 2 sprays into both nostrils daily     ibuprofen 200 MG capsule Take 200 mg by mouth every 6 hours as needed for fever     levothyroxine (SYNTHROID/LEVOTHROID) 100 MCG tablet Take 1 tablet (100 mcg) by mouth daily     losartan (COZAAR) 100 MG tablet TAKE 1 TABLET BY MOUTH ONCE DAILY     Immunization History   Administered Date(s) Administered     Influenza Intranasal Vaccine 4 valent 10/01/2015     Influenza Vaccine, 6+MO IM (QUADRIVALENT W/PRESERVATIVES) 10/01/2017     Tdap (Adacel,Boostrix) 09/22/2011     Zoster vaccine, live 08/15/2017     PREVENTATIVE HEALTH                                                      Breast CA screening: DUE  Cervical CA screening: screening no longer indicated  Colon CA screening: DUE  Lung CA screening: patient does not meet screening criteria  Dexa: DUE  Screening HCV: completed  Screening HIV: completed  Screening cholesterol: DUE  Screening diabetes:  DUE  STD testing: no risk factors present  Depression screening: PHQ-2 assessment completed and reviewed - no intervention indicated at this time  Alcohol misuse screening: alcohol use reviewed - no intervention indicated at this time  Immunizations: Pneumovax DUE; will consider the Shingrix series    HEALTH RISK ASSESSMENT                                                      In general, how would you rate your overall physical health? good  Outside of work, how many days during the  "week do you exercise? 5 days/week  Outside of work, approximately how many minutes a day do you exercise? 40 minutes/week    If you drink alcohol do you typically have >3 drinks per day or >7 drinks per week? no  Do you usually eat at least 4 servings of fruit and vegetables a day, include whole grains & fiber and avoid regularly eating high fat or \"junk\" foods? no     Do you have any problems taking medications regularly? no  Do you have any side effects from medications? no    Needs assistance with daily activities: no assistance needed    Which of the following safety concerns are present in your home? no concerns identified    Hearing impairment: known decreased hearing right ear (chronic)    In the past 6 months, have you been bothered by leaking of urine? no    In general, how would you rate your overall mental or emotional health? great    Additional concerns today: no    OBJECTIVE                                                      /78   Pulse 53   Temp 97.2  F (36.2  C) (Tympanic)   Resp 16   Ht 1.6 m (5' 3\")   Wt 78 kg (172 lb)   LMP  (LMP Unknown)   SpO2 98%   BMI 30.47 kg/m    Constitutional: well-appearing  Respiratory: normal respiratory effort; clear to auscultation bilaterally  Cardiovascular: regular rate and rhythm; no edema  Gastrointestinal: soft, non-tender, non-distended, and bowel sounds present; no organomegaly or masses   Musculoskeletal: normal gait and station  Psych: normal mood and affect    Cognitive impairment noted: no    Visual acuity (with glasses): Right: 20/20    Left: 20/20 Both: 20/20    EKG: sinus jen (55), short RI (100)    ASSESSMENT/PLAN                                                       (Z00.00) Welcome to Medicare preventive visit  (primary encounter diagnosis)  Comment: PMH, PSH, FH, SH, medications, allergies, immunizations, and preventative health and wellness measures reviewed.   Plan: see below for plans.     (Z12.31) Encounter for screening " mammogram for breast cancer  Plan: mammogram ordered - patient to schedule.     (Z12.11) Special screening for malignant neoplasms, colon  Comment: patient declines screening colonoscopy.   Plan: FIT test ordered - patient to pick-up, complete, and mail in when able.     (Z78.0) Asymptomatic menopause  Plan: DEXA ordered - patient to schedule.     (Z23) Need for vaccination  Plan: Pneumovax given today.    (E03.9) Hypothyroidism, unspecified type  (N18.3) CKD (chronic kidney disease), stage III (H)  (Z13.220) Screening for cholesterol level  (Z13.1) Screening for diabetes mellitus  (Z98.84) S/P gastric bypass  (Z13.0) Screening for blood disease  (E55.9) Vitamin D deficiency  Plan: fasting labs today.     The instructions on the AVS were discussed and explained to the patient. Patient expressed understanding of instructions.    (Chart documentation was completed, in part, with [employer] voice-recognition software. Even though reviewed, some grammatical, spelling, and word errors may remain.)    Prema Rodgers MD   [address] [address]  T: [phone], F: [phone]

## 2020-08-12 DIAGNOSIS — G47.00 INSOMNIA, UNSPECIFIED TYPE: ICD-10-CM

## 2020-08-12 LAB — DEPRECATED CALCIDIOL+CALCIFEROL SERPL-MC: 23 UG/L (ref 20–75)

## 2020-08-12 RX ORDER — AMITRIPTYLINE HYDROCHLORIDE 50 MG/1
50 TABLET ORAL AT BEDTIME
Qty: 90 TABLET | Refills: 3 | Status: SHIPPED | OUTPATIENT
Start: 2020-08-12 | End: 2021-06-18

## 2020-08-14 DIAGNOSIS — Z12.11 SPECIAL SCREENING FOR MALIGNANT NEOPLASMS, COLON: ICD-10-CM

## 2020-08-14 PROCEDURE — 82274 ASSAY TEST FOR BLOOD FECAL: CPT | Performed by: INTERNAL MEDICINE

## 2020-08-16 LAB — HEMOCCULT STL QL IA: NEGATIVE

## 2020-09-04 ENCOUNTER — ANCILLARY PROCEDURE (OUTPATIENT)
Dept: BONE DENSITY | Facility: CLINIC | Age: 65
End: 2020-09-04
Attending: INTERNAL MEDICINE
Payer: COMMERCIAL

## 2020-09-04 ENCOUNTER — ANCILLARY PROCEDURE (OUTPATIENT)
Dept: MAMMOGRAPHY | Facility: CLINIC | Age: 65
End: 2020-09-04
Attending: INTERNAL MEDICINE
Payer: COMMERCIAL

## 2020-09-04 DIAGNOSIS — Z12.31 ENCOUNTER FOR SCREENING MAMMOGRAM FOR BREAST CANCER: ICD-10-CM

## 2020-09-04 DIAGNOSIS — Z78.0 ASYMPTOMATIC MENOPAUSE: ICD-10-CM

## 2020-09-04 DIAGNOSIS — Z12.31 VISIT FOR SCREENING MAMMOGRAM: ICD-10-CM

## 2020-09-04 PROCEDURE — 77080 DXA BONE DENSITY AXIAL: CPT | Performed by: INTERNAL MEDICINE

## 2020-09-04 PROCEDURE — 77067 SCR MAMMO BI INCL CAD: CPT | Mod: TC

## 2020-09-04 PROCEDURE — 77063 BREAST TOMOSYNTHESIS BI: CPT | Mod: TC

## 2020-09-08 DIAGNOSIS — I10 BENIGN ESSENTIAL HYPERTENSION: ICD-10-CM

## 2020-09-09 RX ORDER — LOSARTAN POTASSIUM 100 MG/1
TABLET ORAL
Qty: 90 TABLET | Refills: 2 | Status: SHIPPED | OUTPATIENT
Start: 2020-09-09 | End: 2021-06-07

## 2020-10-07 ENCOUNTER — VIRTUAL VISIT (OUTPATIENT)
Dept: CARDIOLOGY | Facility: CLINIC | Age: 65
End: 2020-10-07
Payer: COMMERCIAL

## 2020-10-07 DIAGNOSIS — R00.2 PALPITATIONS: ICD-10-CM

## 2020-10-07 DIAGNOSIS — R00.1 BRADYCARDIA: Primary | ICD-10-CM

## 2020-10-07 DIAGNOSIS — I10 BENIGN ESSENTIAL HYPERTENSION: ICD-10-CM

## 2020-10-07 DIAGNOSIS — I35.0 MODERATE AORTIC STENOSIS: ICD-10-CM

## 2020-10-07 PROCEDURE — 99214 OFFICE O/P EST MOD 30 MIN: CPT | Mod: 95 | Performed by: INTERNAL MEDICINE

## 2020-10-07 NOTE — LETTER
"10/7/2020    Prema Rodgers MD  [address] [facility] [address]    RE: Ilana Shin       Dear Colleague,    I had the pleasure of seeing Ilana Shin in the [facility].    Ilana Shin is a 65 year old female who is being evaluated via a billable video visit.      The patient has been notified of following:     \"This video visit will be conducted via a call between you and your physician/provider. We have found that certain health care needs can be provided without the need for an in-person physical exam.  This service lets us provide the care you need with a video conversation.  If a prescription is necessary we can send it directly to your pharmacy.  If lab work is needed we can place an order for that and you can then stop by our lab to have the test done at a later time.    Video visits are billed at different rates depending on your insurance coverage.  Please reach out to your insurance provider with any questions.    If during the course of the call the physician/provider feels a video visit is not appropriate, you will not be charged for this service.\"    Patient has given verbal consent for Video visit? Yes  How would you like to obtain your AVS? Mail a copy  If you are dropped from the video visit, the video invite should be resent to: Text to cell phone: [phone]  Will anyone else be joining your video visit? No      HISTORY:    Ilana Shin is a pleasant 65-year-old female seen once by me 2 and half years ago at [facility] [place].  She has a history of hypertension, bradycardia, stage III CKD, fibromyalgia, hypothyroidism, mild obesity, and mild to moderate aortic stenosis.  Her initial consult was for bradycardia which was completely asymptomatic.    At the time of our last visit a Holter monitor was done which showed significant bradycardia but completely asymptomatic.  Most of her bradycardia was at night.  That study showed just a few PACs but no atrial " fibrillation.  I reviewed all of these ECGs done over the last few years in clinic and they all show sinus bradycardia, no documented evidence of atrial fib.  She also had an echocardiogram completed which demonstrated previously unrecognized aortic stenosis with a mean gradient of 20 mmHg across the aortic valve giving a valve area of 1.25 cm .  She had normal LV function and other valves functioned normally.  My concern at the time was that she may have atrial fibrillation that had been undocumented since a caregiver had noticed an irregular rhythm at some point.    Today Ilana reports that she has done well since our last visit 2 years ago.  She remains physically active, exercising on a regular basis without any real limitations.  She was alarmed about an episode that occurred last Thursday.  She felt that her heart rate accelerated and she felt that she was somewhat warm.  She was short of breath with activity such as walking up a flight of steps but not at rest.  There was no associated chest pain, diaphoresis, nausea, or lightheadedness.  She states that this sensation lasted about 24 hours and has been gradually improving but is not completely back to normal today.  I had her check her pulse today and she felt that it was regular.    Ilana reports that she has had occasional episodes of a similar sense of an irregular pulse over the years but these are very rare.  She would estimate that she has had maybe 1 or 2/year and they are usually brief.  All of the other episodes have resolved within a few minutes.  This is the first time she has had an episode lasting 24 hours.    The patient denies any exertional chest, arm, neck, or jaw discomfort.  She feels that her stamina has had a gradual decline over the years but has not changed significantly compared to a year ago.  She denies exertional chest pain, PND/orthopnea, syncope or near syncope, strokelike symptoms, peripheral edema, or claudication.      Physical  Exam:    Constitutional: Appears relaxed and comfortable  Eyes: Normal in appearance.  Conjugate eye movements without nystagmus  Respiratory:  Breathing comfortably without apparent dyspnea or audible wheezes  Musculoskeletal:  Smooth upper extremity movements without visible tremor  Skin:  No visible lesions, discoloration or cyanosis  Neurological: Normal speech, no facial droop or nystagmus, normal thought process'  Psychiatric:  alert and orientated, does not seem anxious.    The rest of a comprehensive physical examination is deferred due to [facility] (public health emergency) video visit restrictions.       ASSESSMENT/PLAN:    1.  Sinus bradycardia.  The patient remains completely asymptomatic and once again we talked about the fact that the only reason to treat bradycardia would be symptoms which usually would consist of fatigue or lightheadedness.  She will let me know if she begins to develop these.  2.  Possible atrial fibrillation.  The patient has a [id number]-XIYA score of 3 with a history of hypertension, female gender, and age greater than 65.  If atrial fibrillation were proven she would be a candidate for initiation of anticoagulation.  I talked to her about the various mechanisms that we might use to discover atrial fib, including a 30-day event monitor, her coming into the emergency room with symptoms, or a apple watch or POPSUGAR device.  She seemed very interested in the latter.  She wrote down the name and plans on purchasing 1 and monitoring her heart rate via that mechanism.  I reminded her that for now we are leaving that responsibility to her and it is important that we discover atrial fib if it exists.  We also talked about the possibility of an implantable monitor but that is probably extreme at this point.  3.  Aortic stenosis.  The patient had mild to moderate aortic stenosis 2-1/2 years ago and needs a repeat echocardiogram.  She is willing to have this done so I will put an order through and  contact her with results.  At the time of that phone call will also follow-up on her plans to monitor her atrial fibrillation, and reconsider a 30-day event monitor if she has not made any effort to obtain the self monitoring device.    Thank you for allowing me to participate in your patient's care.  Please do not hesitate to call if I can be of further assistance.  We will plan a one-year follow-up visit but I have asked her to contact me if she does not have complete resolution of her residual mild dyspnea within the next week or if she has more episodes of her sense of palpitations.      Video-Visit Details    Type of service:  Video Visit    Video Start Time: 7:40 AM  Video End Time: 8:16 AM    Originating Location (pt. Location): Home    Distant Location (provider location):  [facility]     Platform used for Video Visit: Dinesh Melo MD          Review Of Systems  Skin: negative  Eyes: glasses  Ears/Nose/Throat: negative  Respiratory: Dyspnea on exertion- SOB with going up stairs   Cardiovascular: palpitations and ON Thursday had an episode of a fast heart beat that lasted thru Friday  And seems to have calmed down now.    Gastrointestinal: negative  Genitourinary: negative  Musculoskeletal: back pain, arthritis, joint pain and fibromyalgia  Neurologic: negative  Psychiatric: anxiety and Lots of anxiety on Thursday   Hematologic/Lymphatic/Immunologic: negative  Endocrine: thyroid disorder    Patient reported vitals:  BP: 115/87  Heart rate: 96  Weight: 170lb    Blanca Santana MARIXA      Thank you for allowing me to participate in the care of your patient.    Sincerely,     Luis A Melo MD     [facility]

## 2020-10-07 NOTE — PROGRESS NOTES
"Ilana Shin is a 65 year old female who is being evaluated via a billable video visit.      The patient has been notified of following:     \"This video visit will be conducted via a call between you and your physician/provider. We have found that certain health care needs can be provided without the need for an in-person physical exam.  This service lets us provide the care you need with a video conversation.  If a prescription is necessary we can send it directly to your pharmacy.  If lab work is needed we can place an order for that and you can then stop by our lab to have the test done at a later time.    Video visits are billed at different rates depending on your insurance coverage.  Please reach out to your insurance provider with any questions.    If during the course of the call the physician/provider feels a video visit is not appropriate, you will not be charged for this service.\"    Patient has given verbal consent for Video visit? Yes  How would you like to obtain your AVS? Mail a copy  If you are dropped from the video visit, the video invite should be resent to: Text to cell phone: [phone]  Will anyone else be joining your video visit? No      HISTORY:    Ilana Shin is a pleasant 65-year-old female seen once by me 2 and half years ago at [facility] [place].  She has a history of hypertension, bradycardia, stage III CKD, fibromyalgia, hypothyroidism, mild obesity, and mild to moderate aortic stenosis.  Her initial consult was for bradycardia which was completely asymptomatic.    At the time of our last visit a Holter monitor was done which showed significant bradycardia but completely asymptomatic.  Most of her bradycardia was at night.  That study showed just a few PACs but no atrial fibrillation.  I reviewed all of these ECGs done over the last few years in clinic and they all show sinus bradycardia, no documented evidence of atrial fib.  She also had an echocardiogram completed which " demonstrated previously unrecognized aortic stenosis with a mean gradient of 20 mmHg across the aortic valve giving a valve area of 1.25 cm .  She had normal LV function and other valves functioned normally.  My concern at the time was that she may have atrial fibrillation that had been undocumented since a caregiver had noticed an irregular rhythm at some point.    Today Ilana reports that she has done well since our last visit 2 years ago.  She remains physically active, exercising on a regular basis without any real limitations.  She was alarmed about an episode that occurred last Thursday.  She felt that her heart rate accelerated and she felt that she was somewhat warm.  She was short of breath with activity such as walking up a flight of steps but not at rest.  There was no associated chest pain, diaphoresis, nausea, or lightheadedness.  She states that this sensation lasted about 24 hours and has been gradually improving but is not completely back to normal today.  I had her check her pulse today and she felt that it was regular.    Ilana reports that she has had occasional episodes of a similar sense of an irregular pulse over the years but these are very rare.  She would estimate that she has had maybe 1 or 2/year and they are usually brief.  All of the other episodes have resolved within a few minutes.  This is the first time she has had an episode lasting 24 hours.    The patient denies any exertional chest, arm, neck, or jaw discomfort.  She feels that her stamina has had a gradual decline over the years but has not changed significantly compared to a year ago.  She denies exertional chest pain, PND/orthopnea, syncope or near syncope, strokelike symptoms, peripheral edema, or claudication.      Physical Exam:    Constitutional: Appears relaxed and comfortable  Eyes: Normal in appearance.  Conjugate eye movements without nystagmus  Respiratory:  Breathing comfortably without apparent dyspnea or audible  wheezes  Musculoskeletal:  Smooth upper extremity movements without visible tremor  Skin:  No visible lesions, discoloration or cyanosis  Neurological: Normal speech, no facial droop or nystagmus, normal thought process'  Psychiatric:  alert and orientated, does not seem anxious.    The rest of a comprehensive physical examination is deferred due to [facility] (public health emergency) video visit restrictions.       ASSESSMENT/PLAN:    1.  Sinus bradycardia.  The patient remains completely asymptomatic and once again we talked about the fact that the only reason to treat bradycardia would be symptoms which usually would consist of fatigue or lightheadedness.  She will let me know if she begins to develop these.  2.  Possible atrial fibrillation.  The patient has a [id number] score of 3 with a history of hypertension, female gender, and age greater than 65.  If atrial fibrillation were proven she would be a candidate for initiation of anticoagulation.  I talked to her about the various mechanisms that we might use to discover atrial fib, including a 30-day event monitor, her coming into the emergency room with symptoms, or a apple watch or [employer] device.  She seemed very interested in the latter.  She wrote down the name and plans on purchasing 1 and monitoring her heart rate via that mechanism.  I reminded her that for now we are leaving that responsibility to her and it is important that we discover atrial fib if it exists.  We also talked about the possibility of an implantable monitor but that is probably extreme at this point.  3.  Aortic stenosis.  The patient had mild to moderate aortic stenosis 2-1/2 years ago and needs a repeat echocardiogram.  She is willing to have this done so I will put an order through and contact her with results.  At the time of that phone call will also follow-up on her plans to monitor her atrial fibrillation, and reconsider a 30-day event monitor if she has not made any effort to  obtain the self monitoring device.    Thank you for allowing me to participate in your patient's care.  Please do not hesitate to call if I can be of further assistance.  We will plan a one-year follow-up visit but I have asked her to contact me if she does not have complete resolution of her residual mild dyspnea within the next week or if she has more episodes of her sense of palpitations.      Video-Visit Details    Type of service:  Video Visit    Video Start Time: 7:40 AM  Video End Time: 8:16 AM    Originating Location (pt. Location): Home    Distant Location (provider location):  [facility]     Platform used for Video Visit: Dinesh Melo MD          Review Of Systems  Skin: negative  Eyes: glasses  Ears/Nose/Throat: negative  Respiratory: Dyspnea on exertion- SOB with going up stairs   Cardiovascular: palpitations and ON Thursday had an episode of a fast heart beat that lasted thru Friday  And seems to have calmed down now.    Gastrointestinal: negative  Genitourinary: negative  Musculoskeletal: back pain, arthritis, joint pain and fibromyalgia  Neurologic: negative  Psychiatric: anxiety and Lots of anxiety on Thursday   Hematologic/Lymphatic/Immunologic: negative  Endocrine: thyroid disorder    Patient reported vitals:  BP: 115/87  Heart rate: 96  Weight: 170lb    Blanca Santana CMA

## 2020-10-28 DIAGNOSIS — E03.9 HYPOTHYROIDISM, UNSPECIFIED TYPE: ICD-10-CM

## 2020-10-28 RX ORDER — LEVOTHYROXINE SODIUM 100 UG/1
TABLET ORAL
Qty: 90 TABLET | Refills: 2 | Status: SHIPPED | OUTPATIENT
Start: 2020-10-28 | End: 2021-10-12

## 2020-12-13 ENCOUNTER — VIRTUAL VISIT (OUTPATIENT)
Dept: FAMILY MEDICINE | Facility: OTHER | Age: 65
End: 2020-12-13

## 2020-12-13 NOTE — PROGRESS NOTES
"Date: 2020 16:38:11  Clinician: Lana Pittman  Clinician NPI: [id number]  Patient: Ilana Shin  Patient : 1955  Patient Address: [address] [place] [id number]  Patient Phone: [phone]  Visit Protocol: URI  Patient Summary:  Ilana is a 65 year old ( : 1955 ) female who initiated a [facility] Visit for COVID-19 (Coronavirus) evaluation and screening. When asked the question \"Please sign me up to receive news, health information and promotions. \", Ilana responded \"No\".    Ilana states her symptoms started today.   Her symptoms consist of a cough.   Symptom details   Cough: Ilana coughs a few times an hour and her cough is more bothersome at night. Phlegm does not come into her throat when she coughs. She does not believe her cough is caused by post-nasal drip.    Ilana denies having ear pain, headache, wheezing, fever, nasal congestion, nausea, vomiting, rhinitis, facial pain or pressure, myalgias, chills, malaise, sore throat, teeth pain, ageusia, diarrhea, and anosmia. She also denies taking antibiotic medication in the past month and having recent facial or sinus surgery in the past 60 days. She is not experiencing dyspnea.   Precipitating events  She has recently been exposed to someone with influenza. Ilana has been in close contact with the following high risk individuals: children under the age of 5, people with asthma, heart disease or diabetes, and adults 65 or older.   Pertinent COVID-19 (Coronavirus) information  Ilana does not work or volunteer as healthcare worker or a . In the past 14 days, Ilana has not worked or volunteered at a healthcare facility or group living setting.   In the past 14 days, she also has not lived in a congregate living setting.   Ilana has not had a close contact with a laboratory-confirmed COVID-19 patient within 14 days of symptom onset.    Since 2019, Ilana has not been tested for COVID-19 and has not had upper respiratory infection or " influenza-like illness.   Pertinent medical history  Ilana has asthma. She uses quick-relief inhaler less than two times per week. She refills her quick-relief inhaler less than two times per year. She wakes up at night with asthma symptoms less than two times per month.   She has been told by her provider to avoid NSAIDs.   Ilana does not get yeast infections when she takes antibiotics.   Ilana does not have diabetes. She denies having immunosuppressive conditions (e.g., chemotherapy, HIV, organ transplant, long-term use of steroids or other immunosuppressive medications, splenectomy). She does not have severe COPD and congestive heart failure.   Ilana does not need a return to work/school note.   Weight: 175 lbs   Ilana does not smoke or use smokeless tobacco.   Additional information as reported by the patient (free text): My adult son woke up 3 days ago with chest cold. Today he's lost his smell and taste. I woke up with chest cold this morning.  I have asthma,  but it usually kicks in if I have a bad cold.. he lives with me..   Weight: 175 lbs    MEDICATIONS: amitriptyline-chlordiazepoxide oral, losartan oral, ALLERGIES: NKDA  Clinician Response:  Dear Ilana,   Your symptoms show that you may have coronavirus (COVID-19). This illness can cause fever, cough and trouble breathing. Many people get a mild case and get better on their own. Some people can get very sick.  What should I do?  We would like to test you for this virus.   1. Please call [phone] to schedule your visit. Explain that you were referred by [facility] to have a COVID-19 test. Be ready to share your [facility] visit ID number.  * If you need to schedule in [facility] please call [phone] or for [employer] employees please call [phone].  * If you need to schedule in the [place] area please call [phone]. [place] employees call [phone].  The following will serve as your written order for this COVID Test, ordered by me, for the indication of  "suspected COVID [Z20.828]: The test will be ordered in [employer], our electronic health record, after you are scheduled. It will show as ordered and authorized by Dell Gonzalez MD.  Order: COVID-19 (Coronavirus) PCR for SYMPTOMATIC testing from [facility].   2. When it's time for your COVID test:  Stay at least 6 feet away from others. (If someone will drive you to your test, stay in the backseat, as far away from the  as you can.)   Cover your mouth and nose with a mask, tissue or washcloth.  Go straight to the testing site. Don't make any stops on the way there or back.      3.Starting now: Stay home and away from others (self-isolate) until:   You've had no fever---and no medicine that reduces fever---for one full day (24 hours). And...   Your other symptoms have gotten better. For example, your cough or breathing has improved. And...   At least 10 days have passed since your symptoms started.       During this time, don't leave the house except for testing or medical care.   Stay in your own room, even for meals. Use your own bathroom if you can.   Stay away from others in your home. No hugging, kissing or shaking hands. No visitors.  Don't go to work, school or anywhere else.    Clean \"high touch\" surfaces often (doorknobs, counters, handles, etc.). Use a household cleaning spray or wipes. You'll find a full list of  on the EPA website: www.epa.gov/pesticide-registration/list-n-disinfectants-use-against-sars-cov-2.   Cover your mouth and nose with a mask, tissue or washcloth to avoid spreading germs.  Wash your hands and face often. Use soap and water.  Caregivers in these groups are at risk for severe illness due to COVID-19:  o People 65 years and older  o People who live in a nursing home or long-term care facility  o People with chronic disease (lung, heart, cancer, diabetes, kidney, liver, immunologic)  o People who have a weakened immune system, including those who:   Are in cancer treatment  Take " medicine that weakens the immune system, such as corticosteroids  Had a bone marrow or organ transplant  Have an immune deficiency  Have poorly controlled HIV or AIDS  Are obese (body mass index of 40 or higher)  Smoke regularly   o Caregivers should wear gloves while washing dishes, handling laundry and cleaning bedrooms and bathrooms.  o Use caution when washing and drying laundry: Don't shake dirty laundry, and use the warmest water setting that you can.  o For more tips, go to www.cdc.gov/coronavirus/2019-ncov/downloads/10Things.pdf.    4.Sign up for [employer]. We know it's scary to hear that you might have COVID-19. We want to track your symptoms to make sure you're okay over the next 2 weeks. Please look for an email from [employer]---this is a free, online program that we'll use to keep in touch. To sign up, follow the link in the email. Learn more at [email or web address]  How can I take care of myself?   Get lots of rest. Drink extra fluids (unless a doctor has told you not to).   Take Tylenol (acetaminophen) for fever or pain. If you have liver or kidney problems, ask your family doctor if it's okay to take Tylenol.   Adults can take either:    650 mg (two 325 mg pills) every 4 to 6 hours, or...   1,000 mg (two 500 mg pills) every 8 hours as needed.    Note: Don't take more than 3,000 mg in one day. Acetaminophen is found in many medicines (both prescribed and over-the-counter medicines). Read all labels to be sure you don't take too much.   For children, check the Tylenol bottle for the right dose. The dose is based on the child's age or weight.    If you have other health problems (like cancer, heart failure, an organ transplant or severe kidney disease): Call your specialty clinic if you don't feel better in the next 2 days.       Know when to call 911. Emergency warning signs include:    Trouble breathing or shortness of breath Pain or pressure in the chest that doesn't go away Feeling  confused like you haven't felt before, or not being able to wake up Bluish-colored lips or face.  Where can I get more information?   [facility] -- About COVID-19: [email or web address]   CDC -- What to Do If You're Sick: www.cdc.gov/coronavirus/2019-ncov/about/steps-when-sick.html   CDC -- Ending Home Isolation: www.cdc.gov/coronavirus/2019-ncov/hcp/disposition-in-home-patients.html   CDC -- Caring for Someone: www.cdc.gov/coronavirus/2019-ncov/if-you-are-sick/care-for-someone.html   [facility] -- Interim Guidance for Hospital Discharge to Home: www.[facility].mn./diseases/coronavirus/hcp/hospdischarge.pdf   [facility] clinical trials (COVID-19 research studies): clinicalaffairs.[facility]-clinical-trials    Below are the COVID-19 hotlines at the [place] Department of Health ([facility]). Interpreters are available.    For health questions: Call 108-521-1082 or 1-169.300.3659 (7 a.m. to 7 p.m.) For questions about schools and childcare: Call 084-689-0807 or 1-565.820.8340 (7 a.m. to 7 p.m.)    COVID-19 (Coronavirus) General Information  Because there is currently no vaccine to prevent infection, the best way to protect yourself is to avoid being exposed to this virus. Common symptoms of COVID-19 include but are not limited to fever, cough, and shortness of breath. These symptoms appear 2-14 days after you are exposed to the virus that causes COVID-19. Click here for more information from the CDC on how to protect yourself.  If you are sick with COVID-19 or suspect you are infected with the virus that causes COVID-19, follow the steps here from the CDC to help prevent the disease from spreading to people in your home and community.  Click here for general information from the CDC on testing.  If you develop any of these emergency warning signs for COVID-19, get medical attention immediately:     Trouble breathing    Persistent pain or pressure in the chest    New confusion or inability to  arouse    Bluish lips or face      Call your doctor or clinic before going in. Call 911 if you have a medical emergency and notify the  you have or think you may have COVID-19.  For more detailed and up to date information on COVID-19 (Coronavirus), please visit the CDC website.   Diagnosis: Contact with and (suspected) exposure to other viral communicable diseases  Diagnosis ICD: Z20.828

## 2020-12-14 DIAGNOSIS — Z20.822 SUSPECTED 2019 NOVEL CORONAVIRUS INFECTION: Primary | ICD-10-CM

## 2020-12-14 DIAGNOSIS — Z20.822 SUSPECTED 2019 NOVEL CORONAVIRUS INFECTION: ICD-10-CM

## 2020-12-14 PROCEDURE — U0003 INFECTIOUS AGENT DETECTION BY NUCLEIC ACID (DNA OR RNA); SEVERE ACUTE RESPIRATORY SYNDROME CORONAVIRUS 2 (SARS-COV-2) (CORONAVIRUS DISEASE [COVID-19]), AMPLIFIED PROBE TECHNIQUE, MAKING USE OF HIGH THROUGHPUT TECHNOLOGIES AS DESCRIBED BY CMS-2020-01-R: HCPCS | Performed by: FAMILY MEDICINE

## 2020-12-15 LAB
SARS-COV-2 RNA SPEC QL NAA+PROBE: ABNORMAL
SPECIMEN SOURCE: ABNORMAL

## 2021-01-15 ENCOUNTER — HEALTH MAINTENANCE LETTER (OUTPATIENT)
Age: 66
End: 2021-01-15

## 2021-03-27 ENCOUNTER — E-VISIT (OUTPATIENT)
Dept: URGENT CARE | Facility: URGENT CARE | Age: 66
End: 2021-03-27
Payer: COMMERCIAL

## 2021-03-27 DIAGNOSIS — B96.89 ACUTE BACTERIAL SINUSITIS: Primary | ICD-10-CM

## 2021-03-27 DIAGNOSIS — J01.90 ACUTE BACTERIAL SINUSITIS: Primary | ICD-10-CM

## 2021-03-27 PROCEDURE — 99421 OL DIG E/M SVC 5-10 MIN: CPT | Performed by: PHYSICIAN ASSISTANT

## 2021-03-27 NOTE — PATIENT INSTRUCTIONS
Dear Ilana Shin    After reviewing your responses, I've been able to diagnose you with?a sinus infection caused by bacteria.?     Based on your responses and diagnosis, I have prescribed an oral antibiotic to treat your symptoms. I have sent this to your pharmacy.?     It is also important to stay well hydrated, get lots of rest and take over-the-counter decongestants,?tylenol?or ibuprofen if you?are able to?take those medications per your primary care provider to help relieve discomfort.?     It is important that you take?all of?your prescribed medication even if your symptoms are improving after a few doses.? Taking?all of?your medicine helps prevent the symptoms from returning.?     If your symptoms worsen, you develop severe headache, vomiting, high fever (>102), or are not improving in 7 days, please contact your primary care provider for an appointment or visit any of our convenient Walk-in Care or Urgent Care Centers to be seen which can be found on our website?here.?     Thanks again for choosing?us?as your health care partner,?   ?  Isidra Maldonado PA-C?     Sinusitis (Antibiotic Treatment)    The sinuses are air-filled spaces within the bones of the face. They connect to the inside of the nose. Sinusitis is an inflammation of the tissue that lines the sinuses. Sinusitis can occur during a cold. It can also happen due to allergies to pollens and other particles in the air. Sinusitis can cause symptoms of sinus congestion and a feeling of fullness. A sinus infection causes fever, headache, and facial pain. There is often green or yellow fluid draining from the nose or into the back of the throat (post-nasal drip). You have been given antibiotics to treat this condition.   Home care    Take the full course of antibiotics as instructed. Don't stop taking them, even when you feel better.    Drink plenty of water, hot tea, and other liquids as directed by the healthcare provider. This may help thin nasal  mucus. It also may help your sinuses drain fluids.    Heat may help soothe painful areas of your face. Use a towel soaked in hot water. Or,  the shower and direct the warm spray onto your face. Using a vaporizer along with a menthol rub at night may also help soothe symptoms.     An expectorant with guaifenesin may help thin nasal mucus and help your sinuses drain fluids. Talk with your provider or pharmacists before taking an over-the-counter (OTC) medicine if you have any questions about it or its side effects..    You can use an OTC decongestant, unless a similar medicine was prescribed to you. Nasal sprays work the fastest. Use one that contains phenylephrine or oxymetazoline. First blow your nose gently. Then use the spray. Don't use these medicines more often than directed on the label. If you do, your symptoms may get worse. You may also take pills that contain pseudoephedrine. Don t use products that combine multiple medicines. This is because side effects may be increased. Read labels. You can also ask the pharmacist for help. (People with high blood pressure should not use decongestants. They can raise blood pressure.) Talk with your provider or pharmacist if you have any questions about the medicine..    OTC antihistamines may help if allergies contributed to your sinusitis. Talk with your provider or pharmacist if you have any questions about the medicine..    Don't use nasal rinses or irrigation during an acute sinus infection, unless your healthcare provider tells you to. Rinsing may spread the infection to other areas in your sinuses.    Use acetaminophen or ibuprofen to control pain, unless another pain medicine was prescribed to you. If you have chronic liver or kidney disease or ever had a stomach ulcer, talk with your healthcare provider before using these medicines. Never give aspirin to anyone under age 18 who is ill with a fever. It may cause severe liver damage.    Don't smoke. This  can make symptoms worse.    Follow-up care  Follow up with your healthcare provider, or as advised.   When to seek medical advice  Call your healthcare provider if any of these occur:     Facial pain or headache that gets worse    Stiff neck    Unusual drowsiness or confusion    Swelling of your forehead or eyelids    Symptoms don't go away in 10 days    Vision problems, such as blurred or double vision    Fever of 100.4 F (38 C) or higher, or as directed by your healthcare provider  Call 911  Call 911 if any of these occur:     Seizure    Trouble breathing    Feeling dizzy or faint    Fingernails, skin or lips look blue, purple , or gray  Prevention  Here are steps you can take to help prevent an infection:     Keep good hand washing habits.    Don t have close contact with people who have sore throats, colds, or other upper respiratory infections.    Don t smoke, and stay away from secondhand smoke.    Stay up to date with of your vaccines.  [employer] last reviewed this educational content on 12/1/2019 2000-2020 [employer]. All rights reserved. This information is not intended as a substitute for professional medical care. Always follow your healthcare professional's instructions.

## 2021-05-09 ENCOUNTER — IMMUNIZATION (OUTPATIENT)
Dept: LAB | Facility: CLINIC | Age: 66
End: 2021-05-09
Payer: COMMERCIAL

## 2021-06-04 DIAGNOSIS — I10 BENIGN ESSENTIAL HYPERTENSION: ICD-10-CM

## 2021-06-07 RX ORDER — LOSARTAN POTASSIUM 100 MG/1
TABLET ORAL
Qty: 90 TABLET | Refills: 0 | Status: SHIPPED | OUTPATIENT
Start: 2021-06-07 | End: 2021-06-18

## 2021-06-18 DIAGNOSIS — I10 BENIGN ESSENTIAL HYPERTENSION: ICD-10-CM

## 2021-06-18 DIAGNOSIS — G47.00 INSOMNIA, UNSPECIFIED TYPE: ICD-10-CM

## 2021-06-18 DIAGNOSIS — E03.9 HYPOTHYROIDISM, UNSPECIFIED TYPE: ICD-10-CM

## 2021-06-18 RX ORDER — LEVOTHYROXINE SODIUM 100 UG/1
100 TABLET ORAL DAILY
Qty: 90 TABLET | Refills: 0 | Status: SHIPPED | OUTPATIENT
Start: 2021-06-18 | End: 2021-10-19

## 2021-06-18 RX ORDER — AMITRIPTYLINE HYDROCHLORIDE 50 MG/1
50 TABLET ORAL AT BEDTIME
Qty: 90 TABLET | Refills: 0 | Status: SHIPPED | OUTPATIENT
Start: 2021-06-18 | End: 2022-04-14

## 2021-06-18 RX ORDER — LOSARTAN POTASSIUM 100 MG/1
100 TABLET ORAL DAILY
Qty: 90 TABLET | Refills: 0 | Status: SHIPPED | OUTPATIENT
Start: 2021-06-18 | End: 2021-10-29

## 2021-08-02 ENCOUNTER — OFFICE VISIT (OUTPATIENT)
Dept: INTERNAL MEDICINE | Facility: CLINIC | Age: 66
End: 2021-08-02
Payer: COMMERCIAL

## 2021-08-02 VITALS
WEIGHT: 177 LBS | DIASTOLIC BLOOD PRESSURE: 88 MMHG | HEART RATE: 109 BPM | RESPIRATION RATE: 18 BRPM | SYSTOLIC BLOOD PRESSURE: 132 MMHG | TEMPERATURE: 97.8 F | OXYGEN SATURATION: 100 % | BODY MASS INDEX: 31.35 KG/M2

## 2021-08-02 DIAGNOSIS — E03.9 HYPOTHYROIDISM, UNSPECIFIED TYPE: ICD-10-CM

## 2021-08-02 DIAGNOSIS — R53.83 OTHER FATIGUE: Primary | ICD-10-CM

## 2021-08-02 DIAGNOSIS — M79.10 MYALGIA: ICD-10-CM

## 2021-08-02 DIAGNOSIS — J45.20 MILD INTERMITTENT ASTHMA, UNSPECIFIED WHETHER COMPLICATED: ICD-10-CM

## 2021-08-02 LAB
ERYTHROCYTE [DISTWIDTH] IN BLOOD BY AUTOMATED COUNT: 13.1 % (ref 10–15)
HCT VFR BLD AUTO: 41.6 % (ref 35–47)
HGB BLD-MCNC: 13.3 G/DL (ref 11.7–15.7)
MCH RBC QN AUTO: 30.1 PG (ref 26.5–33)
MCHC RBC AUTO-ENTMCNC: 32 G/DL (ref 31.5–36.5)
MCV RBC AUTO: 94 FL (ref 78–100)
PLATELET # BLD AUTO: 206 10E3/UL (ref 150–450)
RBC # BLD AUTO: 4.42 10E6/UL (ref 3.8–5.2)
TSH SERPL DL<=0.005 MIU/L-ACNC: 2.72 MU/L (ref 0.4–4)
WBC # BLD AUTO: 7.3 10E3/UL (ref 4–11)

## 2021-08-02 PROCEDURE — 85027 COMPLETE CBC AUTOMATED: CPT | Performed by: PHYSICIAN ASSISTANT

## 2021-08-02 PROCEDURE — 86618 LYME DISEASE ANTIBODY: CPT | Performed by: PHYSICIAN ASSISTANT

## 2021-08-02 PROCEDURE — 99214 OFFICE O/P EST MOD 30 MIN: CPT | Performed by: PHYSICIAN ASSISTANT

## 2021-08-02 PROCEDURE — 84443 ASSAY THYROID STIM HORMONE: CPT | Performed by: PHYSICIAN ASSISTANT

## 2021-08-02 PROCEDURE — 36415 COLL VENOUS BLD VENIPUNCTURE: CPT | Performed by: PHYSICIAN ASSISTANT

## 2021-08-02 NOTE — PROGRESS NOTES
"    Assessment & Plan     Other fatigue      - Lyme Disease Katia with reflex to WB Serum  - CBC with platelets  - TSH with free T4 reflex    Myalgia      - Lyme Disease Katia with reflex to WB Serum    Hypothyroidism, unspecified type      - TSH with free T4 reflex    Mild intermittent asthma, unspecified whether complicated  Requesting refill on advair - using prn for asthma   - fluticasone-salmeterol (ADVAIR) 100-50 MCG/DOSE inhaler; Inhale 1 puff into the lungs every 12 hours             BMI:   Estimated body mass index is 31.35 kg/m  as calculated from the following:    Height as of 8/11/20: 1.6 m (5' 3\").    Weight as of this encounter: 80.3 kg (177 lb).   Weight management plan: Patient was referred to their PCP to discuss a diet and exercise plan.    Labs today   Patient has routine visit scheduled with PCP upcoming     Return in about 15 days (around 8/17/2021) for Routine Visit, regular primary provider.    Marilyn Saldana PA-C  [facility]   Ilana is a 65 year old who presents for the following health issues     HPI     Concern - Concerned about lymes disease  Onset: 1 month  Description: Neck sore, couple of dizzy spells, headaches, extra fatiuge  Intensity: moderate  Progression of Symptoms:  same  Accompanying Signs & Symptoms: Had a weird bruise- rash that was ring shaped on lower leg but now wondering if it was from a tick. Did not find a tick on her body  Previous history of similar problem:   Precipitating factors:        Worsened by:   Alleviating factors:        Improved by:   Therapies tried and outcome:   Requesting refill on advair for prn use asthma.   Using when gets a URI to help with wheezing mostly       Review of Systems   Constitutional, HEENT, cardiovascular, pulmonary, gi and gu systems are negative, except as otherwise noted.      Objective    /88   Pulse 109   Temp 97.8  F (36.6  C) (Tympanic)   Resp 18   Wt 80.3 kg (177 lb)  "  LMP  (LMP Unknown)   SpO2 100%   BMI 31.35 kg/m    Body mass index is 31.35 kg/m .  Physical Exam   GENERAL: healthy, alert and no distress  RESP: lungs clear to auscultation - no rales, rhonchi or wheezes  CV: tachycardia, normal S1 S2, no S3 or S4 and no peripheral edema  MS: no gross musculoskeletal defects noted, no edema  SKIN: no suspicious lesions or rashes

## 2021-08-04 LAB — B BURGDOR IGG+IGM SER QL: 0.04

## 2021-08-17 ENCOUNTER — OFFICE VISIT (OUTPATIENT)
Dept: INTERNAL MEDICINE | Facility: CLINIC | Age: 66
End: 2021-08-17
Payer: COMMERCIAL

## 2021-08-17 VITALS
HEIGHT: 63 IN | TEMPERATURE: 97.3 F | SYSTOLIC BLOOD PRESSURE: 110 MMHG | WEIGHT: 173 LBS | HEART RATE: 65 BPM | OXYGEN SATURATION: 99 % | DIASTOLIC BLOOD PRESSURE: 70 MMHG | BODY MASS INDEX: 30.65 KG/M2 | RESPIRATION RATE: 16 BRPM

## 2021-08-17 DIAGNOSIS — Z12.11 SPECIAL SCREENING FOR MALIGNANT NEOPLASMS, COLON: ICD-10-CM

## 2021-08-17 DIAGNOSIS — Z00.00 MEDICARE ANNUAL WELLNESS VISIT, SUBSEQUENT: Primary | ICD-10-CM

## 2021-08-17 DIAGNOSIS — I49.9 IRREGULAR HEART BEAT: ICD-10-CM

## 2021-08-17 DIAGNOSIS — N18.31 STAGE 3A CHRONIC KIDNEY DISEASE (H): ICD-10-CM

## 2021-08-17 DIAGNOSIS — Z12.31 ENCOUNTER FOR SCREENING MAMMOGRAM FOR BREAST CANCER: ICD-10-CM

## 2021-08-17 DIAGNOSIS — I48.91 ATRIAL FIBRILLATION, UNSPECIFIED TYPE (H): ICD-10-CM

## 2021-08-17 DIAGNOSIS — Z13.1 SCREENING FOR DIABETES MELLITUS: ICD-10-CM

## 2021-08-17 DIAGNOSIS — I48.92 ATRIAL FLUTTER, UNSPECIFIED TYPE (H): ICD-10-CM

## 2021-08-17 DIAGNOSIS — E03.4 HYPOTHYROIDISM DUE TO ACQUIRED ATROPHY OF THYROID: ICD-10-CM

## 2021-08-17 DIAGNOSIS — Z13.220 SCREENING FOR CHOLESTEROL LEVEL: ICD-10-CM

## 2021-08-17 DIAGNOSIS — E55.9 VITAMIN D DEFICIENCY: ICD-10-CM

## 2021-08-17 LAB
ALBUMIN SERPL-MCNC: 3.6 G/DL (ref 3.4–5)
ALP SERPL-CCNC: 102 U/L (ref 40–150)
ALT SERPL W P-5'-P-CCNC: 66 U/L (ref 0–50)
ANION GAP SERPL CALCULATED.3IONS-SCNC: <1 MMOL/L (ref 3–14)
AST SERPL W P-5'-P-CCNC: 88 U/L (ref 0–45)
BILIRUB SERPL-MCNC: 0.8 MG/DL (ref 0.2–1.3)
BUN SERPL-MCNC: 19 MG/DL (ref 7–30)
CALCIUM SERPL-MCNC: 8.7 MG/DL (ref 8.5–10.1)
CHLORIDE BLD-SCNC: 111 MMOL/L (ref 94–109)
CHOLEST SERPL-MCNC: 217 MG/DL
CO2 SERPL-SCNC: 28 MMOL/L (ref 20–32)
CREAT SERPL-MCNC: 1.08 MG/DL (ref 0.52–1.04)
DEPRECATED CALCIDIOL+CALCIFEROL SERPL-MC: 32 UG/L (ref 20–75)
FASTING STATUS PATIENT QL REPORTED: YES
GFR SERPL CREATININE-BSD FRML MDRD: 54 ML/MIN/1.73M2
GLUCOSE BLD-MCNC: 95 MG/DL (ref 70–99)
HDLC SERPL-MCNC: 78 MG/DL
LDLC SERPL CALC-MCNC: 125 MG/DL
NONHDLC SERPL-MCNC: 139 MG/DL
POTASSIUM BLD-SCNC: 4.7 MMOL/L (ref 3.4–5.3)
PROT SERPL-MCNC: 7.3 G/DL (ref 6.8–8.8)
SODIUM SERPL-SCNC: 138 MMOL/L (ref 133–144)
TRIGL SERPL-MCNC: 68 MG/DL
TSH SERPL DL<=0.005 MIU/L-ACNC: 2.03 MU/L (ref 0.4–4)

## 2021-08-17 PROCEDURE — 84443 ASSAY THYROID STIM HORMONE: CPT | Performed by: INTERNAL MEDICINE

## 2021-08-17 PROCEDURE — 36415 COLL VENOUS BLD VENIPUNCTURE: CPT | Performed by: INTERNAL MEDICINE

## 2021-08-17 PROCEDURE — 93000 ELECTROCARDIOGRAM COMPLETE: CPT | Performed by: INTERNAL MEDICINE

## 2021-08-17 PROCEDURE — 80061 LIPID PANEL: CPT | Performed by: INTERNAL MEDICINE

## 2021-08-17 PROCEDURE — 99214 OFFICE O/P EST MOD 30 MIN: CPT | Mod: 25 | Performed by: INTERNAL MEDICINE

## 2021-08-17 PROCEDURE — 82306 VITAMIN D 25 HYDROXY: CPT | Performed by: INTERNAL MEDICINE

## 2021-08-17 PROCEDURE — 80053 COMPREHEN METABOLIC PANEL: CPT | Performed by: INTERNAL MEDICINE

## 2021-08-17 PROCEDURE — G0438 PPPS, INITIAL VISIT: HCPCS | Performed by: INTERNAL MEDICINE

## 2021-08-17 RX ORDER — METOPROLOL SUCCINATE 25 MG/1
25 TABLET, EXTENDED RELEASE ORAL DAILY
Qty: 90 TABLET | Refills: 3 | Status: SHIPPED | OUTPATIENT
Start: 2021-08-17 | End: 2021-08-19

## 2021-08-17 ASSESSMENT — MIFFLIN-ST. JEOR: SCORE: 1293.85

## 2021-08-17 NOTE — PROGRESS NOTES
ASSESSMENT/PLAN                                                       (Z00.00) Medicare annual wellness visit, subsequent  (primary encounter diagnosis)  Comment: PMH, PSH, FH, SH, medications, allergies, immunizations, and preventative health measures reviewed and updated as appropriate.  Plan: see below for plans.      (Z12.11) Special screening for malignant neoplasms, colon  Plan: FIT test ordered - patient to pick-up, complete, and mail in when able.    (Z12.31) Encounter for screening mammogram for breast cancer  Plan: screening mammogram ordered - patient to schedule.     (N18.31) Stage 3a chronic kidney disease  (E03.4) Hypothyroidism due to acquired atrophy of thyroid  (Z13.220) Screening for cholesterol level  (Z13.1) Screening for diabetes mellitus  (E55.9) Vitamin D deficiency  Plan: fasting labs today.    (I49.9) Irregular heart beat  (I48.91) Atrial fibrillation, unspecified type (H)  (I48.92) Atrial flutter, unspecified type (H)  Comment: EKG demonstrates atrial for ablation-flutter, rate 111; this is a new diagnosis; CHADS 2 VASC score is 3.   Plan: START Toprol-XL for rate control; START Xarelto for anticoagulation (avoid or significantly minimize NSAID use); referred to cardiology for further evaluation and management - patient will be contacted to schedule.      Appropriate preventive services were discussed with this patient, including applicable screening as appropriate for cardiovascular disease, diabetes, osteopenia/osteoporosis, and glaucoma.  As appropriate for age/gender, discussed screening for colorectal cancer, prostate cancer, breast cancer, and cervical cancer. Checklist reviewing preventive services available has been given to the patient.    Reviewed patients plan of care. The Basic Care Plan (routine screening as documented in Health Maintenance) for Ilana Shin meets the Care Plan requirement. This Care Plan has been established and reviewed with the Patient.    Prema  MD Vishal   [address] [address]  T: [phone], F: [phone]    SUBJECTIVE                                                      Ilana Shin is a very pleasant 65 year old female who presents for her subsequent AWV:    Current providers (other than myself): n/a     PMH, PSH, FH, SH, medications, allergies, immunizations, preventative health, and health risk assessment reviewed and updated as appropriate.    Past Medical History:   Diagnosis Date     Benign essential hypertension      Bradycardia      CKD (chronic kidney disease), stage III      Fibromyalgia      Hypothyroidism      Moderate aortic stenosis      Obesity (BMI 30-39.9)      Osteopenia      Past Surgical History:   Procedure Laterality Date     ARTHROSCOPY KNEE WITH LATERAL MENISCECTOMY Right 3/8/2019    Procedure: RIGHT KNEE  ARTHROSCOPIC, CHONDROPLASTY OF THE MEDIAL CONDRA,  PARTIAL  LATERAL MENISCECTOMY;  Surgeon: Vinay Laird MD;  Location: [facility]     BUNIONECTOMY Right 8/2014     BUNIONECTOMY LADONNA, REPAIR HAMMER TOE(S), COMBINED  09/2003    Left foot     C LAPAROSCOPIC JASS EN Y GASTROJEJUNOSTOMY  2006     CHOLECYSTECTOMY, OPEN  1994     RELEASE CARPAL TUNNEL BILATERAL  1999     TUBAL LIGATION  1984    age 29     Family History   Problem Relation Age of Onset     Hypertension Mother      Deep Vein Thrombosis (DVT) Mother         x2 - both provoked (trauma, surgery)     Diabetes Type 2  Father      Hypertension Father      Breast Cancer Paternal Grandmother      Breast Cancer Sister 45     Myocardial Infarction Sister 50     Deep Vein Thrombosis (DVT) Sister         provoked (surgery)     Lung Cancer Brother         smoker     Hypertension Sister         x2     Cerebrovascular Disease No family hx of      Colon Cancer No family hx of      Ovarian Cancer No family hx of      Social History     Occupational History     Occupation: Retired - instrument tech   Tobacco Use     Smoking status: Former  Smoker     Packs/day: 1.00     Years: 20.00     Pack years: 20.00     Types: Cigarettes     Quit date: 2002     Years since quittin.6     Smokeless tobacco: Never Used   Substance and Sexual Activity     Alcohol use: No     Drug use: No     Sexual activity: Not Currently   Social History Narrative    .    Lives with  and two grandchildren.     2 adult kids.    5 grand kids.    Walks regularly.      Allergies   Allergen Reactions     Codeine Sulfate GI Disturbance     Current Outpatient Medications   Medication Sig     acetaminophen (TYLENOL) 325 MG tablet Take 2 tablets (650 mg) by mouth every 4 hours as needed for mild pain     amitriptyline (ELAVIL) 50 MG tablet Take 1 tablet (50 mg) by mouth At Bedtime     CALCIUM PO      EUTHYROX 100 MCG tablet Take 1 tablet by mouth once daily     fluticasone (FLONASE) 50 MCG/ACT nasal spray Spray 2 sprays into both nostrils daily     fluticasone-salmeterol (ADVAIR) 100-50 MCG/DOSE inhaler Inhale 1 puff into the lungs every 12 hours     ibuprofen 200 MG capsule Take 200 mg by mouth every 6 hours as needed for fever     levothyroxine (SYNTHROID/LEVOTHROID) 100 MCG tablet Take 1 tablet (100 mcg) by mouth daily     losartan (COZAAR) 100 MG tablet Take 1 tablet (100 mg) by mouth daily     Immunization History   Administered Date(s) Administered     SHEREE MARCUS Janssen 2021     Influenza (intradermal) 2011, 2012     Influenza Intranasal Vaccine 4 valent 10/01/2015     Influenza Vaccine, 6+MO IM (QUADRIVALENT W/PRESERVATIVES) 10/01/2017     Pneumococcal 23 valent 2020     Tdap (Adacel,Boostrix) 2011     Zoster vaccine, live 08/15/2017     PREVENTATIVE HEALTH                                                      BMI: obese  Blood pressure: well-controlled on current regimen   Breast CA screening: DUE  Colon CA screening: DUE  Lung CA screening: patient does not meet screening criteria  Dexa: up to date   Screening cholesterol:  "DUE  Screening diabetes: DUE  Alcohol misuse screening: alcohol use reviewed - no intervention indicated at this time  Immunizations: reviewed; Shingrix series DUE - not covered by Medicare (may obtain in pharmacy if desired)     HEALTH RISK ASSESSMENT                                                      In general, how would you rate your overall physical health? good  Outside of work, how many days during the week do you exercise? 6-7 days/week  Outside of work, approximately how many minutes a day do you exercise? 30-45 minutes    If you drink alcohol do you typically have >3 drinks per day or >7 drinks per week? No  Do you usually eat at least 4 servings of fruit and vegetables a day, include whole grains & fiber and avoid regularly eating high fat or \"junk\" foods? Yes     Do you have any problems taking medications regularly? No  Do you have any side effects from medications? No    Assistance with daily activities: No    Safety concerns: No    Fall risk assessment: completed today (see ambulatory assessments)    Hearing concerns: No    In the past 6 months, have you been bothered by leaking of urine: No    In general, how would you rate your overall mental or emotional health: very good    PHQ-2/PHQ-9 assessment: completed today (see ambulatory assessments)    Additional concerns today: No    OBJECTIVE                                                      /70 (BP Location: Left arm, Patient Position: Chair, Cuff Size: Adult Regular)   Pulse 65   Temp 97.3  F (36.3  C) (Temporal)   Resp 16   Ht 1.6 m (5' 3\")   Wt 78.5 kg (173 lb)   LMP  (LMP Unknown)   SpO2 99%   BMI 30.65 kg/m    Constitutional: well-appearing  Head, Ears, and Eyes: normocephalic; normal external auditory canal and pinna; tympanic membranes visualized and normal; normal lids and conjunctivae  Neck: supple, symmetric, no thyromegaly or lymphadenopathy  Respiratory: normal respiratory effort; clear to auscultation " bilaterally  Cardiovascular: irregularly irregular; no edema  Gastrointestinal: soft, non-tender, and non-distended; no organomegaly or masses  Musculoskeletal: normal gait and station  Psych: normal judgment and insight; normal mood and affect; recent and remote memory intact    Cognitive impairment noted: No     EKG (today): atrial fibrillation-atrial flutter (rate 111)  ---  (Note was completed, in part, with [employer] voice-recognition software. Documentation was reviewed, but some grammatical, spelling, and word errors may remain.)

## 2021-08-17 NOTE — PATIENT INSTRUCTIONS
Fasting labs and stool kit pick-up today.    ---    START Toprol XL 25mg once daily (for rate control of atrial fibrillation).    START Xarelto (anticoagulation):    15mg TWICE a day x 3 weeks then  20mg ONCE a day continuously    You will be contacted to schedule a cardiology evaluation.

## 2021-08-18 ASSESSMENT — ASTHMA QUESTIONNAIRES: ACT_TOTALSCORE: 24

## 2021-08-19 DIAGNOSIS — I48.91 ATRIAL FIBRILLATION, UNSPECIFIED TYPE (H): ICD-10-CM

## 2021-08-19 RX ORDER — METOPROLOL SUCCINATE 25 MG/1
25 TABLET, EXTENDED RELEASE ORAL DAILY
Qty: 14 TABLET | Refills: 0 | Status: SHIPPED | OUTPATIENT
Start: 2021-08-19 | End: 2022-01-19 | Stop reason: SINTOL

## 2021-09-04 ENCOUNTER — HEALTH MAINTENANCE LETTER (OUTPATIENT)
Age: 66
End: 2021-09-04

## 2021-09-22 ENCOUNTER — HOSPITAL ENCOUNTER (OUTPATIENT)
Dept: CARDIOLOGY | Facility: CLINIC | Age: 66
Discharge: HOME | End: 2021-09-22
Attending: INTERNAL MEDICINE | Admitting: INTERNAL MEDICINE
Payer: COMMERCIAL

## 2021-09-22 DIAGNOSIS — I35.0 MODERATE AORTIC STENOSIS: ICD-10-CM

## 2021-09-22 LAB — LVEF ECHO: NORMAL

## 2021-09-22 PROCEDURE — 93306 TTE W/DOPPLER COMPLETE: CPT

## 2021-09-22 PROCEDURE — 93306 TTE W/DOPPLER COMPLETE: CPT | Mod: 26 | Performed by: INTERNAL MEDICINE

## 2021-10-12 ENCOUNTER — OFFICE VISIT (OUTPATIENT)
Dept: CARDIOLOGY | Facility: CLINIC | Age: 66
End: 2021-10-12
Attending: INTERNAL MEDICINE
Payer: COMMERCIAL

## 2021-10-12 VITALS
DIASTOLIC BLOOD PRESSURE: 78 MMHG | SYSTOLIC BLOOD PRESSURE: 134 MMHG | OXYGEN SATURATION: 100 % | HEART RATE: 96 BPM | WEIGHT: 178.4 LBS | RESPIRATION RATE: 12 BRPM | BODY MASS INDEX: 31.6 KG/M2

## 2021-10-12 DIAGNOSIS — I48.91 ATRIAL FIBRILLATION, UNSPECIFIED TYPE (H): ICD-10-CM

## 2021-10-12 PROCEDURE — 99213 OFFICE O/P EST LOW 20 MIN: CPT | Performed by: INTERNAL MEDICINE

## 2021-10-12 NOTE — PROGRESS NOTES
HPI and Plan:   Today I had the pleasure of seeing Ilana Shin at [facility]. She is a pleasant 66 year old patient with a PMH of paroxysmal atrial fibrillation, moderate aortic stenosis, hypertension who presents to the clinic for annual follow-up.  The patient was previously seen by one of my colleagues who has retired.    The patient recently underwent a transthoracic echocardiogram which showed normal LV size and systolic function, moderate aortic stenosis with aortic valve area of 1.2 cm .  Left atrium is moderately dilated.  She was in atrial fibrillation during the study.  No significant changes were noted compared to the prior study.  She takes Xarelto for anticoagulation.    The patient overall is doing well and tells me that being on metoprolol makes her feel fatigued.  Other than that she has not received any significant change in her clinical status from cardiology standpoint.  She did break her left leg and could not exercise for 6 months.  Now she is starting to get back to exercising again.     Assessment and plan:   1.  Atrial fibrillation, [id number] of 3 age, gender and hypertension  2.  Moderate aortic stenosis, aortic valve area 1.2 cm     Patient is overall doing well from cardiovascular standpoint.  She is in atrial fibrillation and her ventricular rate is controlled.  She takes low-dose of metoprolol which causes generalized fatigue and tiredness.  I talked to her about switching it to diltiazem but she would like to wait it out.  I told her that in the future if she decides to make the switch switch she should just give us a call and we can put in a prescription to her pharmacy.  She understands.  We also talked about her being on blood thinner for stroke prevention.  She occasionally takes NSAIDs for generalized pain and have told her to switch to taking Tylenol.  She does not take aspirin.  I will have her come back in see me in 1 year.    Thank you for allowing  me to participate in the care of Ilana Shin    This note was completed in part using Dragon voice recognition software. Although reviewed after completion, some word and grammatical errors may occur.    Kavin Jordan MD  Cardiology    Orders Placed This Encounter   Procedures     Follow-Up with Cardiologist       No orders of the defined types were placed in this encounter.      Medications Discontinued During This Encounter   Medication Reason     EUTHYROX 100 MCG tablet      rivaroxaban ANTICOAGULANT (XARELTO ANTICOAGULANT) 15 MG TABS tablet        Encounter Diagnosis   Name Primary?     Atrial fibrillation, unspecified type (H)        CURRENT MEDICATIONS:  Current Outpatient Medications   Medication Sig Dispense Refill     acetaminophen (TYLENOL) 325 MG tablet Take 2 tablets (650 mg) by mouth every 4 hours as needed for mild pain 50 tablet 0     amitriptyline (ELAVIL) 50 MG tablet Take 1 tablet (50 mg) by mouth At Bedtime (Patient taking differently: Take 25 mg by mouth At Bedtime ) 90 tablet 0     CALCIUM PO        fluticasone (FLONASE) 50 MCG/ACT nasal spray Spray 2 sprays into both nostrils daily (Patient taking differently: Spray 2 sprays into both nostrils daily as needed ) 16 g 0     fluticasone-salmeterol (ADVAIR) 100-50 MCG/DOSE inhaler Inhale 1 puff into the lungs every 12 hours (Patient taking differently: Inhale 1 puff into the lungs 2 times daily as needed ) 3 each 1     ibuprofen 200 MG capsule Take 200 mg by mouth every 6 hours as needed for fever       levothyroxine (SYNTHROID/LEVOTHROID) 100 MCG tablet Take 1 tablet (100 mcg) by mouth daily 90 tablet 0     losartan (COZAAR) 100 MG tablet Take 1 tablet (100 mg) by mouth daily 90 tablet 0     metoprolol succinate ER (TOPROL-XL) 25 MG 24 hr tablet Take 1 tablet (25 mg) by mouth daily 14 tablet 0     rivaroxaban ANTICOAGULANT (XARELTO ANTICOAGULANT) 20 MG TABS tablet Take 1 tablet (20 mg) by mouth daily (with dinner) (start AFTER completing 15mg  twice a day x 21 days) 90 tablet 3       ALLERGIES     Allergies   Allergen Reactions     Codeine Sulfate GI Disturbance       PAST MEDICAL HISTORY:  Past Medical History:   Diagnosis Date     Benign essential hypertension      Bradycardia      CKD (chronic kidney disease), stage III (H)      Fibromyalgia      Hypothyroidism      Moderate aortic stenosis      Obesity (BMI 30-39.9)      Osteopenia      Pure hypercholesterolemia        PAST SURGICAL HISTORY:  Past Surgical History:   Procedure Laterality Date     ARTHROSCOPY KNEE WITH LATERAL MENISCECTOMY Right 3/8/2019    Procedure: RIGHT KNEE  ARTHROSCOPIC, CHONDROPLASTY OF THE MEDIAL CONDRA,  PARTIAL  LATERAL MENISCECTOMY;  Surgeon: Vinay Laird MD;  Location: [facility] OR     BUNIONECTOMY Right 8/2014     BUNIONECTOMY LADONNA, REPAIR HAMMER TOE(S), COMBINED  09/2003    Left foot     C LAPAROSCOPIC JASS EN Y GASTROJEJUNOSTOMY  2006     CHOLECYSTECTOMY, OPEN  1994     RELEASE CARPAL TUNNEL BILATERAL  1999     TUBAL LIGATION  1984    age 29       FAMILY HISTORY:  Family History   Problem Relation Age of Onset     Hypertension Mother      Deep Vein Thrombosis (DVT) Mother         x2 - both provoked (trauma, surgery)     Diabetes Type 2  Father      Hypertension Father      Breast Cancer Paternal Grandmother      Breast Cancer Sister 45     Myocardial Infarction Sister 50     Deep Vein Thrombosis (DVT) Sister         provoked (surgery)     Lung Cancer Brother         smoker     Hypertension Sister         x2     Cerebrovascular Disease No family hx of      Colon Cancer No family hx of      Ovarian Cancer No family hx of        SOCIAL HISTORY:  Social History     Socioeconomic History     Marital status:      Spouse name: None     Number of children: None     Years of education: None     Highest education level: None   Occupational History     Occupation: Retired - instrument tech   Tobacco Use     Smoking status: Former Smoker     Packs/day: 1.00     Years: 20.00      Pack years: 20.00     Types: Cigarettes     Quit date: 2002     Years since quittin.7     Smokeless tobacco: Never Used   Substance and Sexual Activity     Alcohol use: No     Drug use: No     Sexual activity: Not Currently   Other Topics Concern     Parent/sibling w/ CABG, MI or angioplasty before 65F 55M? No      Service Not Asked     Blood Transfusions Not Asked     Caffeine Concern Not Asked     Occupational Exposure Not Asked     Hobby Hazards Not Asked     Sleep Concern Not Asked     Stress Concern Not Asked     Weight Concern Not Asked     Special Diet Not Asked     Back Care Not Asked     Exercise Not Asked     Bike Helmet Yes     Seat Belt Yes     Self-Exams Not Asked   Social History Narrative    .    Lives with  and two grandchildren.     2 adult kids.    5 grand kids.    Walks regularly.      Social Determinants of Health     Financial Resource Strain:      Difficulty of Paying Living Expenses:    Food Insecurity:      Worried About Running Out of Food in the Last Year:      Ran Out of Food in the Last Year:    Transportation Needs:      Lack of Transportation (Medical):      Lack of Transportation (Non-Medical):    Physical Activity:      Days of Exercise per Week:      Minutes of Exercise per Session:    Stress:      Feeling of Stress :    Social Connections:      Frequency of Communication with Friends and Family:      Frequency of Social Gatherings with Friends and Family:      Attends [employer] Services:      Active Member of Clubs or Organizations:      Attends Club or Organization Meetings:      Marital Status:    Intimate Partner Violence:      Fear of Current or Ex-Partner:      Emotionally Abused:      Physically Abused:      Sexually Abused:        Review of Systems:  Skin:  Positive for bruising     Eyes:  Positive for glasses    ENT:  Positive for tinnitus    Respiratory:  Positive for dyspnea on exertion     Cardiovascular:  Negative for;edema Positive  for;chest pain;palpitations;lower extremity symptoms    Gastroenterology: Negative      Genitourinary:  Negative      Musculoskeletal:  Positive for back pain;neck pain;joint pain;fibromyalgia shoulder pains  Neurologic:  Negative      Psychiatric:  Positive for sleep disturbances;excessive stress    Heme/Lymph/Imm:  Negative      Endocrine:  Positive for thyroid disorder      Physical Exam:  Vitals: /78 (BP Location: Left arm, Patient Position: Sitting, Cuff Size: Adult Regular)   Pulse 96   Resp 12   Wt 80.9 kg (178 lb 6.4 oz)   LMP  (LMP Unknown)   SpO2 100%   BMI 31.60 kg/m    Eyes: No icterus.  Pulmonary: Chest symmetric, lungs clear bilaterally and no crackles, wheezes or rales.  Cardiovascular: IRR with normal S1 and S2, soft systolic murmur at aortic area murmur, JVP normal.  Musculoskeletal: Edema of the lower extremities: None.  Neurologic: Oriented and appropriate without obvious focal deficits.   Psychiatric: Normal affect.     Recent Lab Results:  LIPID RESULTS:  Lab Results   Component Value Date    CHOL 217 (H) 08/17/2021    CHOL 197 08/11/2020    HDL 78 08/17/2021    HDL 77 08/11/2020     (H) 08/17/2021     (H) 08/11/2020    TRIG 68 08/17/2021    TRIG 60 08/11/2020    CHOLHDLRATIO 3.0 07/21/2014       LIVER ENZYME RESULTS:  Lab Results   Component Value Date    AST 88 (H) 08/17/2021    AST 21 08/11/2020    ALT 66 (H) 08/17/2021    ALT 21 08/11/2020       CBC RESULTS:  Lab Results   Component Value Date    WBC 7.3 08/02/2021    WBC 3.6 (L) 08/11/2020    RBC 4.42 08/02/2021    RBC 4.16 08/11/2020    HGB 13.3 08/02/2021    HGB 12.2 08/11/2020    HCT 41.6 08/02/2021    HCT 39.0 08/11/2020    MCV 94 08/02/2021    MCV 94 08/11/2020    MCH 30.1 08/02/2021    MCH 29.3 08/11/2020    MCHC 32.0 08/02/2021    MCHC 31.3 (L) 08/11/2020    RDW 13.1 08/02/2021    RDW 13.4 08/11/2020     08/02/2021     08/11/2020       BMP RESULTS:  Lab Results   Component Value Date      08/17/2021     08/11/2020    POTASSIUM 4.7 08/17/2021    POTASSIUM 4.2 08/11/2020    CHLORIDE 111 (H) 08/17/2021    CHLORIDE 110 (H) 08/11/2020    CO2 28 08/17/2021    CO2 25 08/11/2020    ANIONGAP <1 (L) 08/17/2021    ANIONGAP 6 08/11/2020    GLC 95 08/17/2021    GLC 92 08/11/2020    BUN 19 08/17/2021    BUN 10 08/11/2020    CR 1.08 (H) 08/17/2021    CR 0.96 08/11/2020    GFRESTIMATED 54 (L) 08/17/2021    GFRESTIMATED 61 08/11/2020    GFRESTBLACK 71 08/11/2020    TIFFANY 8.7 08/17/2021    TIFFANY 9.8 08/11/2020        A1C RESULTS:  Lab Results   Component Value Date    A1C 5.8 07/21/2015       INR RESULTS:  Lab Results   Component Value Date    INR 0.89 08/27/2013    INR 1.04 08/15/2006       CC  Prema Rodgers MD  [address] [place] [id number]    All medical records were reviewed in detail and discussed with the patient. Greater than 30 mins were spent with the patient, 50% of this time was spent on counseling and coordination of care.  After visit summary was printed and given to the patient.

## 2021-10-12 NOTE — LETTER
10/12/2021    Prema Rodgers MD  [address] [facility] [address]    RE: Ilana ANUSHA Shin       Dear Colleague,    I had the pleasure of seeing Ilana Shin in the [facility].    HPI and Plan:   Today I had the pleasure of seeing Ilana Shin at [facility]. She is a pleasant 66 year old patient with a PMH of paroxysmal atrial fibrillation, moderate aortic stenosis, hypertension who presents to the clinic for annual follow-up.  The patient was previously seen by one of my colleagues who has retired.    The patient recently underwent a transthoracic echocardiogram which showed normal LV size and systolic function, moderate aortic stenosis with aortic valve area of 1.2 cm .  Left atrium is moderately dilated.  She was in atrial fibrillation during the study.  No significant changes were noted compared to the prior study.  She takes Xarelto for anticoagulation.    The patient overall is doing well and tells me that being on metoprolol makes her feel fatigued.  Other than that she has not received any significant change in her clinical status from cardiology standpoint.  She did break her left leg and could not exercise for 6 months.  Now she is starting to get back to exercising again.     Assessment and plan:   1.  Atrial fibrillation, [id number] of 3 age, gender and hypertension  2.  Moderate aortic stenosis, aortic valve area 1.2 cm     Patient is overall doing well from cardiovascular standpoint.  She is in atrial fibrillation and her ventricular rate is controlled.  She takes low-dose of metoprolol which causes generalized fatigue and tiredness.  I talked to her about switching it to diltiazem but she would like to wait it out.  I told her that in the future if she decides to make the switch switch she should just give us a call and we can put in a prescription to her pharmacy.  She understands.  We also talked about her being on  blood thinner for stroke prevention.  She occasionally takes NSAIDs for generalized pain and have told her to switch to taking Tylenol.  She does not take aspirin.  I will have her come back in see me in 1 year.    Thank you for allowing me to participate in the care of Ilana Shin    This note was completed in part using Dragon voice recognition software. Although reviewed after completion, some word and grammatical errors may occur.    Kavin Jordan MD  Cardiology    Orders Placed This Encounter   Procedures     Follow-Up with Cardiologist       No orders of the defined types were placed in this encounter.      Medications Discontinued During This Encounter   Medication Reason     EUTHYROX 100 MCG tablet      rivaroxaban ANTICOAGULANT (XARELTO ANTICOAGULANT) 15 MG TABS tablet        Encounter Diagnosis   Name Primary?     Atrial fibrillation, unspecified type (H)        CURRENT MEDICATIONS:  Current Outpatient Medications   Medication Sig Dispense Refill     acetaminophen (TYLENOL) 325 MG tablet Take 2 tablets (650 mg) by mouth every 4 hours as needed for mild pain 50 tablet 0     amitriptyline (ELAVIL) 50 MG tablet Take 1 tablet (50 mg) by mouth At Bedtime (Patient taking differently: Take 25 mg by mouth At Bedtime ) 90 tablet 0     CALCIUM PO        fluticasone (FLONASE) 50 MCG/ACT nasal spray Spray 2 sprays into both nostrils daily (Patient taking differently: Spray 2 sprays into both nostrils daily as needed ) 16 g 0     fluticasone-salmeterol (ADVAIR) 100-50 MCG/DOSE inhaler Inhale 1 puff into the lungs every 12 hours (Patient taking differently: Inhale 1 puff into the lungs 2 times daily as needed ) 3 each 1     ibuprofen 200 MG capsule Take 200 mg by mouth every 6 hours as needed for fever       levothyroxine (SYNTHROID/LEVOTHROID) 100 MCG tablet Take 1 tablet (100 mcg) by mouth daily 90 tablet 0     losartan (COZAAR) 100 MG tablet Take 1 tablet (100 mg) by mouth daily 90 tablet 0     metoprolol succinate  ER (TOPROL-XL) 25 MG 24 hr tablet Take 1 tablet (25 mg) by mouth daily 14 tablet 0     rivaroxaban ANTICOAGULANT (XARELTO ANTICOAGULANT) 20 MG TABS tablet Take 1 tablet (20 mg) by mouth daily (with dinner) (start AFTER completing 15mg twice a day x 21 days) 90 tablet 3       ALLERGIES     Allergies   Allergen Reactions     Codeine Sulfate GI Disturbance       PAST MEDICAL HISTORY:  Past Medical History:   Diagnosis Date     Benign essential hypertension      Bradycardia      CKD (chronic kidney disease), stage III (H)      Fibromyalgia      Hypothyroidism      Moderate aortic stenosis      Obesity (BMI 30-39.9)      Osteopenia      Pure hypercholesterolemia        PAST SURGICAL HISTORY:  Past Surgical History:   Procedure Laterality Date     ARTHROSCOPY KNEE WITH LATERAL MENISCECTOMY Right 3/8/2019    Procedure: RIGHT KNEE  ARTHROSCOPIC, CHONDROPLASTY OF THE MEDIAL CONDRA,  PARTIAL  LATERAL MENISCECTOMY;  Surgeon: Vinay Laird MD;  Location: [facility] OR     BUNIONECTOMY Right 8/2014     BUNIONECTOMY LADONNA, REPAIR HAMMER TOE(S), COMBINED  09/2003    Left foot     C LAPAROSCOPIC JASS EN Y GASTROJEJUNOSTOMY  2006     CHOLECYSTECTOMY, OPEN  1994     RELEASE CARPAL TUNNEL BILATERAL  1999     TUBAL LIGATION  1984    age 29       FAMILY HISTORY:  Family History   Problem Relation Age of Onset     Hypertension Mother      Deep Vein Thrombosis (DVT) Mother         x2 - both provoked (trauma, surgery)     Diabetes Type 2  Father      Hypertension Father      Breast Cancer Paternal Grandmother      Breast Cancer Sister 45     Myocardial Infarction Sister 50     Deep Vein Thrombosis (DVT) Sister         provoked (surgery)     Lung Cancer Brother         smoker     Hypertension Sister         x2     Cerebrovascular Disease No family hx of      Colon Cancer No family hx of      Ovarian Cancer No family hx of        SOCIAL HISTORY:  Social History     Socioeconomic History     Marital status:      Spouse name: None      Number of children: None     Years of education: None     Highest education level: None   Occupational History     Occupation: Retired - instrument tech   Tobacco Use     Smoking status: Former Smoker     Packs/day: 1.00     Years: 20.00     Pack years: 20.00     Types: Cigarettes     Quit date: 2002     Years since quittin.7     Smokeless tobacco: Never Used   Substance and Sexual Activity     Alcohol use: No     Drug use: No     Sexual activity: Not Currently   Other Topics Concern     Parent/sibling w/ CABG, MI or angioplasty before 65F 55M? No      Service Not Asked     Blood Transfusions Not Asked     Caffeine Concern Not Asked     Occupational Exposure Not Asked     Hobby Hazards Not Asked     Sleep Concern Not Asked     Stress Concern Not Asked     Weight Concern Not Asked     Special Diet Not Asked     Back Care Not Asked     Exercise Not Asked     Bike Helmet Yes     Seat Belt Yes     Self-Exams Not Asked   Social History Narrative    .    Lives with  and two grandchildren.     2 adult kids.    5 grand kids.    Walks regularly.      Social Determinants of Health     Financial Resource Strain:      Difficulty of Paying Living Expenses:    Food Insecurity:      Worried About Running Out of Food in the Last Year:      Ran Out of Food in the Last Year:    Transportation Needs:      Lack of Transportation (Medical):      Lack of Transportation (Non-Medical):    Physical Activity:      Days of Exercise per Week:      Minutes of Exercise per Session:    Stress:      Feeling of Stress :    Social Connections:      Frequency of Communication with Friends and Family:      Frequency of Social Gatherings with Friends and Family:      Attends Synagogue Services:      Active Member of Clubs or Organizations:      Attends Club or Organization Meetings:      Marital Status:    Intimate Partner Violence:      Fear of Current or Ex-Partner:      Emotionally Abused:      Physically Abused:       Sexually Abused:        Review of Systems:  Skin:  Positive for bruising     Eyes:  Positive for glasses    ENT:  Positive for tinnitus    Respiratory:  Positive for dyspnea on exertion     Cardiovascular:  Negative for;edema Positive for;chest pain;palpitations;lower extremity symptoms    Gastroenterology: Negative      Genitourinary:  Negative      Musculoskeletal:  Positive for back pain;neck pain;joint pain;fibromyalgia shoulder pains  Neurologic:  Negative      Psychiatric:  Positive for sleep disturbances;excessive stress    Heme/Lymph/Imm:  Negative      Endocrine:  Positive for thyroid disorder      Physical Exam:  Vitals: /78 (BP Location: Left arm, Patient Position: Sitting, Cuff Size: Adult Regular)   Pulse 96   Resp 12   Wt 80.9 kg (178 lb 6.4 oz)   LMP  (LMP Unknown)   SpO2 100%   BMI 31.60 kg/m    Eyes: No icterus.  Pulmonary: Chest symmetric, lungs clear bilaterally and no crackles, wheezes or rales.  Cardiovascular: IRR with normal S1 and S2, soft systolic murmur at aortic area murmur, JVP normal.  Musculoskeletal: Edema of the lower extremities: None.  Neurologic: Oriented and appropriate without obvious focal deficits.   Psychiatric: Normal affect.     Recent Lab Results:  LIPID RESULTS:  Lab Results   Component Value Date    CHOL 217 (H) 08/17/2021    CHOL 197 08/11/2020    HDL 78 08/17/2021    HDL 77 08/11/2020     (H) 08/17/2021     (H) 08/11/2020    TRIG 68 08/17/2021    TRIG 60 08/11/2020    CHOLHDLRATIO 3.0 07/21/2014       LIVER ENZYME RESULTS:  Lab Results   Component Value Date    AST 88 (H) 08/17/2021    AST 21 08/11/2020    ALT 66 (H) 08/17/2021    ALT 21 08/11/2020       CBC RESULTS:  Lab Results   Component Value Date    WBC 7.3 08/02/2021    WBC 3.6 (L) 08/11/2020    RBC 4.42 08/02/2021    RBC 4.16 08/11/2020    HGB 13.3 08/02/2021    HGB 12.2 08/11/2020    HCT 41.6 08/02/2021    HCT 39.0 08/11/2020    MCV 94 08/02/2021    MCV 94 08/11/2020    MCH 30.1  08/02/2021    MCH 29.3 08/11/2020    MCHC 32.0 08/02/2021    MCHC 31.3 (L) 08/11/2020    RDW 13.1 08/02/2021    RDW 13.4 08/11/2020     08/02/2021     08/11/2020       BMP RESULTS:  Lab Results   Component Value Date     08/17/2021     08/11/2020    POTASSIUM 4.7 08/17/2021    POTASSIUM 4.2 08/11/2020    CHLORIDE 111 (H) 08/17/2021    CHLORIDE 110 (H) 08/11/2020    CO2 28 08/17/2021    CO2 25 08/11/2020    ANIONGAP <1 (L) 08/17/2021    ANIONGAP 6 08/11/2020    GLC 95 08/17/2021    GLC 92 08/11/2020    BUN 19 08/17/2021    BUN 10 08/11/2020    CR 1.08 (H) 08/17/2021    CR 0.96 08/11/2020    GFRESTIMATED 54 (L) 08/17/2021    GFRESTIMATED 61 08/11/2020    GFRESTBLACK 71 08/11/2020    TIFFANY 8.7 08/17/2021    TIFFANY 9.8 08/11/2020        A1C RESULTS:  Lab Results   Component Value Date    A1C 5.8 07/21/2015       INR RESULTS:  Lab Results   Component Value Date    INR 0.89 08/27/2013    INR 1.04 08/15/2006       CC  Prema Rodgers MD  [address] [place] [address]    All medical records were reviewed in detail and discussed with the patient. Greater than 30 mins were spent with the patient, 50% of this time was spent on counseling and coordination of care.  After visit summary was printed and given to the patient.        Thank you for allowing me to participate in the care of your patient.      Sincerely,     Kavin Jordan MD     [facility]  cc:   Prema Rodgers MD  [address] [place], [place] [id number]

## 2021-10-18 DIAGNOSIS — E03.9 HYPOTHYROIDISM, UNSPECIFIED TYPE: ICD-10-CM

## 2021-10-19 RX ORDER — LEVOTHYROXINE SODIUM 100 UG/1
TABLET ORAL
Qty: 90 TABLET | Refills: 2 | Status: SHIPPED | OUTPATIENT
Start: 2021-10-19 | End: 2022-06-13

## 2021-10-29 DIAGNOSIS — I10 BENIGN ESSENTIAL HYPERTENSION: ICD-10-CM

## 2021-10-29 RX ORDER — LOSARTAN POTASSIUM 100 MG/1
TABLET ORAL
Qty: 90 TABLET | Refills: 3 | Status: SHIPPED | OUTPATIENT
Start: 2021-10-29 | End: 2022-07-27

## 2021-10-29 NOTE — TELEPHONE ENCOUNTER
Routing refill request to provider for review/approval because:  Creatinine   Date Value Ref Range Status   08/17/2021 1.08 (H) 0.52 - 1.04 mg/dL Final   08/11/2020 0.96 0.52 - 1.04 mg/dL Final         Vikas Adams RN  [facility] Triage Nurse

## 2021-11-05 ENCOUNTER — LAB (OUTPATIENT)
Dept: LAB | Facility: CLINIC | Age: 66
End: 2021-11-05
Payer: COMMERCIAL

## 2021-11-05 DIAGNOSIS — Z12.11 SPECIAL SCREENING FOR MALIGNANT NEOPLASMS, COLON: ICD-10-CM

## 2021-11-05 PROCEDURE — 82274 ASSAY TEST FOR BLOOD FECAL: CPT

## 2021-11-08 DIAGNOSIS — Z12.11 SPECIAL SCREENING FOR MALIGNANT NEOPLASMS, COLON: Primary | ICD-10-CM

## 2021-11-11 LAB — HEMOCCULT STL QL IA: NEGATIVE

## 2022-01-19 ENCOUNTER — OFFICE VISIT (OUTPATIENT)
Dept: CARDIOLOGY | Facility: CLINIC | Age: 67
End: 2022-01-19
Payer: COMMERCIAL

## 2022-01-19 VITALS
SYSTOLIC BLOOD PRESSURE: 131 MMHG | HEART RATE: 100 BPM | WEIGHT: 180.4 LBS | DIASTOLIC BLOOD PRESSURE: 78 MMHG | BODY MASS INDEX: 31.96 KG/M2

## 2022-01-19 DIAGNOSIS — I48.0 PAROXYSMAL ATRIAL FIBRILLATION (H): Primary | ICD-10-CM

## 2022-01-19 DIAGNOSIS — I35.0 MODERATE AORTIC STENOSIS: ICD-10-CM

## 2022-01-19 DIAGNOSIS — I10 BENIGN ESSENTIAL HYPERTENSION: ICD-10-CM

## 2022-01-19 PROCEDURE — 99215 OFFICE O/P EST HI 40 MIN: CPT | Performed by: NURSE PRACTITIONER

## 2022-01-19 RX ORDER — QUINIDINE GLUCONATE 324 MG
TABLET, EXTENDED RELEASE ORAL EVERY OTHER DAY
COMMUNITY

## 2022-01-19 RX ORDER — DILTIAZEM HYDROCHLORIDE 120 MG/1
120 CAPSULE, EXTENDED RELEASE ORAL DAILY
Qty: 30 CAPSULE | Refills: 11 | Status: ON HOLD | OUTPATIENT
Start: 2022-01-19 | End: 2022-03-24

## 2022-01-19 NOTE — PATIENT INSTRUCTIONS
Medication Changes:  1. STOP metoprolol   2. START diltiazem  mg daily     Recommendations:  1. Check blood pressure at least 1 hour after medications. Call the clinic if your blood pressure is consistently greater than 130/80.    2. Call if blood pressure is less than 90 on top or less than 100 with lightheadedness.    3. Call to schedule blood pressure check with [facility] pharmacist or primary care nurse visit for free. Please have them send results to cardiology. If you need to have your home blood pressure monitor checked for accuracy, bring that to the appt.   4. Can get [facility] EKG to detect afib.     Follow-up:  1. 14 day zio patch in 1-2 weeks   2. See Leta MELARA for cardiology follow up at [place]: 1-2 months. Call to schedule. Can be virtual     Cardiology Scheduling~[phone]  Cardiology Clinic RN~[phone] (Princess Brown, RN and Veda WYLIE RN)

## 2022-01-19 NOTE — NURSING NOTE
Patient states that she is very fatigued, headaches and would like to know her options.  12:51 PM 01/19/22 ALE Ramsey CMA

## 2022-01-19 NOTE — LETTER
1/19/2022    Prema Rodgers MD  [address] [facility] [address]    RE: Ilana Shin       Dear Colleague,     I had the pleasure of seeing Ilana Shin in the [facility].  Cardiology Clinic Progress Note  Ilana Shin MRN# [id number]   YOB: 1955 Age: 66 year old      Primary Cardiologist:   Dr. Jordan           History of Presenting Illness:      Ilana Shin is a pleasant 66 year old patient with a past cardiac history significant for   1. Aortic stenosis  2. Hypertension  3. Paroxysmal atrial fibrillation    Patient was seen by Dr. Jordan in October 2021.  She had recent echocardiogram which showed normal EF with moderate aortic stenosis EMILY 1.2 cm , moderately dilated LA and was in atrial fibrillation.  She has been on Xarelto for anticoagulation.  She noted that she had fatigue while being on metoprolol but otherwise was doing well from a cardiac standpoint.  He discussed switching to diltiazem but she preferred to continue metoprolol at that time.    Pt presents today for medication concerns.  Blood pressure today is controlled.  However, she reports home blood pressures of  systolic.  I did recommend she have her home blood pressure monitor checked for accuracy.  She denies any lightheadedness but continues with fatigue.  She thinks the fatigue has worsened since last year.  She is unsure if this is related to deconditioning from when she had her broken leg versus medication versus her atrial fibrillation.  She is agreeable to switching her metoprolol to diltiazem.  She will continue working out to see if improving her conditioning helps.  Ultimately, if we feel it is her atrial fibrillation we will set her up with EP.  She is agreeable to Zio patch to see if she is in persistent or paroxysmal and if symptoms correlate with episodes of A. fib.  I would also like to assess her heart rates as heart rate today is 100.  We also discussed options for checking atrial  fibrillation at home such as [facility] mobile EKG and smart watches. Patient reports no chest pain, shortness of breath, PND, orthopnea, presyncope, syncope, edema, heart racing, or palpitations.    Labs:  LIPID RESULTS:  Lab Results   Component Value Date    CHOL 217 (H) 08/17/2021    CHOL 197 08/11/2020    HDL 78 08/17/2021    HDL 77 08/11/2020     (H) 08/17/2021     (H) 08/11/2020    TRIG 68 08/17/2021    TRIG 60 08/11/2020    CHOLHDLRATIO 3.0 07/21/2014       LIVER ENZYME RESULTS:  Lab Results   Component Value Date    AST 88 (H) 08/17/2021    AST 21 08/11/2020    ALT 66 (H) 08/17/2021    ALT 21 08/11/2020       CBC RESULTS:  Lab Results   Component Value Date    WBC 7.3 08/02/2021    WBC 3.6 (L) 08/11/2020    RBC 4.42 08/02/2021    RBC 4.16 08/11/2020    HGB 13.3 08/02/2021    HGB 12.2 08/11/2020    HCT 41.6 08/02/2021    HCT 39.0 08/11/2020    MCV 94 08/02/2021    MCV 94 08/11/2020    MCH 30.1 08/02/2021    MCH 29.3 08/11/2020    MCHC 32.0 08/02/2021    MCHC 31.3 (L) 08/11/2020    RDW 13.1 08/02/2021    RDW 13.4 08/11/2020     08/02/2021     08/11/2020       BMP RESULTS:  Lab Results   Component Value Date     08/17/2021     08/11/2020    POTASSIUM 4.7 08/17/2021    POTASSIUM 4.2 08/11/2020    CHLORIDE 111 (H) 08/17/2021    CHLORIDE 110 (H) 08/11/2020    CO2 28 08/17/2021    CO2 25 08/11/2020    ANIONGAP <1 (L) 08/17/2021    ANIONGAP 6 08/11/2020    GLC 95 08/17/2021    GLC 92 08/11/2020    BUN 19 08/17/2021    BUN 10 08/11/2020    CR 1.08 (H) 08/17/2021    CR 0.96 08/11/2020    GFRESTIMATED 54 (L) 08/17/2021    GFRESTIMATED 61 08/11/2020    GFRESTBLACK 71 08/11/2020    TIFFANY 8.7 08/17/2021    TIFFANY 9.8 08/11/2020        A1C RESULTS:  Lab Results   Component Value Date    A1C 5.8 07/21/2015       INR RESULTS:  Lab Results   Component Value Date    INR 0.89 08/27/2013    INR 1.04 08/15/2006       Results reviewed today.       Current Cardiac Medications     Losartan 100 mg  daily  Metoprolol XL 25 mg daily  Xarelto 20 mg daily                   Assessment and Plan:       Plan    Patient Instructions   Medication Changes:  4. STOP metoprolol d/t fatigue   5. START diltiazem  mg daily for afib rate control     Recommendations:  1. Check blood pressure at least 1 hour after medications. Call the clinic if your blood pressure is consistently greater than 130/80.    2. Call if blood pressure is less than 90 on top or less than 100 with lightheadedness.    3. Call to schedule blood pressure check with [facility] pharmacist or primary care nurse visit for free. Please have them send results to cardiology. If you need to have your home blood pressure monitor checked for accuracy, bring that to the appt.   4. Can get [facility] EKG to detect afib.     Follow-up:  1. 14 day zio patch in 1-2 weeks, to assess paroxysmal versus persistent, correlate symptoms, assess rate control  2. See Leta MELARA for cardiology follow up at [place]: 1-2 months. Call to schedule. Can be virtual     Cardiology Scheduling~[phone]  Cardiology Clinic RN~[phone] (Princess Brown, RN and Veda WYLIE RN)          1. Aortic stenosis    Moderate EMILY 1.2 cm  2021    Follow with echo      2. Paroxysmal atrial fibrillation    Fatigue as possible symptom    Elevated [id number] score 3 for age, gender, hypertension    Continue Xarelto for anticoagulation    Start diltiazem for rate control    fatigue on metoprolol      3. Hypertension    Controlled    Continue losartan and start diltiazem    Fatigue with metoprolol                Thank you for allowing me to participate in this delightful patient's care.      This note was completed in part using Dragon voice recognition software. Although reviewed after completion, some word and grammatical errors may occur.    Leta Hall, MAURO CNP, APRN, CNP           Data:   All laboratory data reviewed      Total time spent today was 47 mins, reviewing  labs, testing, notes, documenting notes, and seeing patient.          Constitutional:  cooperative, alert and oriented, well developed, well nourished, in no acute distress  overweight     Skin:  warm and dry to the touch         Head:  normocephalic       Eyes:  pupils equal and round       ENT:  no pallor or cyanosis       Neck:  no stiffness       Respiratory:  clear to auscultation; normal symmetry        Cardiac: regular rate and irregularly irregular rhythm; normal S1 and S2                 pulses full and equal       GI:  abdomen soft, nondistended     Extremities and Muscular Skeletal:  no edema        Neurological:  affect appropriate; no gross motor deficits       Psych:  Alert and Oriented x 3 , appropriate affact    Thank you for allowing me to participate in the care of your patient.      Sincerely,     MAURO Sylvester [facility]  cc:   No referring provider defined for this encounter.

## 2022-01-19 NOTE — PROGRESS NOTES
Cardiology Clinic Progress Note  Ilana Shin MRN# [id number]   YOB: 1955 Age: 66 year old      Primary Cardiologist:   Dr. Jordan           History of Presenting Illness:      Ilana Shin is a pleasant 66 year old patient with a past cardiac history significant for   1. Aortic stenosis  2. Hypertension  3. Paroxysmal atrial fibrillation    Patient was seen by Dr. Jordan in October 2021.  She had recent echocardiogram which showed normal EF with moderate aortic stenosis EMILY 1.2 cm , moderately dilated LA and was in atrial fibrillation.  She has been on Xarelto for anticoagulation.  She noted that she had fatigue while being on metoprolol but otherwise was doing well from a cardiac standpoint.  He discussed switching to diltiazem but she preferred to continue metoprolol at that time.    Pt presents today for medication concerns.  Blood pressure today is controlled.  However, she reports home blood pressures of  systolic.  I did recommend she have her home blood pressure monitor checked for accuracy.  She denies any lightheadedness but continues with fatigue.  She thinks the fatigue has worsened since last year.  She is unsure if this is related to deconditioning from when she had her broken leg versus medication versus her atrial fibrillation.  She is agreeable to switching her metoprolol to diltiazem.  She will continue working out to see if improving her conditioning helps.  Ultimately, if we feel it is her atrial fibrillation we will set her up with EP.  She is agreeable to Zio patch to see if she is in persistent or paroxysmal and if symptoms correlate with episodes of A. fib.  I would also like to assess her heart rates as heart rate today is 100.  We also discussed options for checking atrial fibrillation at home such as [employer] mobile EKG and smart watches. Patient reports no chest pain, shortness of breath, PND, orthopnea, presyncope, syncope, edema, heart racing, or  palpitations.    Labs:  LIPID RESULTS:  Lab Results   Component Value Date    CHOL 217 (H) 08/17/2021    CHOL 197 08/11/2020    HDL 78 08/17/2021    HDL 77 08/11/2020     (H) 08/17/2021     (H) 08/11/2020    TRIG 68 08/17/2021    TRIG 60 08/11/2020    CHOLHDLRATIO 3.0 07/21/2014       LIVER ENZYME RESULTS:  Lab Results   Component Value Date    AST 88 (H) 08/17/2021    AST 21 08/11/2020    ALT 66 (H) 08/17/2021    ALT 21 08/11/2020       CBC RESULTS:  Lab Results   Component Value Date    WBC 7.3 08/02/2021    WBC 3.6 (L) 08/11/2020    RBC 4.42 08/02/2021    RBC 4.16 08/11/2020    HGB 13.3 08/02/2021    HGB 12.2 08/11/2020    HCT 41.6 08/02/2021    HCT 39.0 08/11/2020    MCV 94 08/02/2021    MCV 94 08/11/2020    MCH 30.1 08/02/2021    MCH 29.3 08/11/2020    MCHC 32.0 08/02/2021    MCHC 31.3 (L) 08/11/2020    RDW 13.1 08/02/2021    RDW 13.4 08/11/2020     08/02/2021     08/11/2020       BMP RESULTS:  Lab Results   Component Value Date     08/17/2021     08/11/2020    POTASSIUM 4.7 08/17/2021    POTASSIUM 4.2 08/11/2020    CHLORIDE 111 (H) 08/17/2021    CHLORIDE 110 (H) 08/11/2020    CO2 28 08/17/2021    CO2 25 08/11/2020    ANIONGAP <1 (L) 08/17/2021    ANIONGAP 6 08/11/2020    GLC 95 08/17/2021    GLC 92 08/11/2020    BUN 19 08/17/2021    BUN 10 08/11/2020    CR 1.08 (H) 08/17/2021    CR 0.96 08/11/2020    GFRESTIMATED 54 (L) 08/17/2021    GFRESTIMATED 61 08/11/2020    GFRESTBLACK 71 08/11/2020    TIFFANY 8.7 08/17/2021    TIFFANY 9.8 08/11/2020        A1C RESULTS:  Lab Results   Component Value Date    A1C 5.8 07/21/2015       INR RESULTS:  Lab Results   Component Value Date    INR 0.89 08/27/2013    INR 1.04 08/15/2006       Results reviewed today.       Current Cardiac Medications     Losartan 100 mg daily  Metoprolol XL 25 mg daily  Xarelto 20 mg daily                   Assessment and Plan:       Plan    Patient Instructions   Medication Changes:  4. STOP metoprolol d/t fatigue    5. START diltiazem  mg daily for afib rate control     Recommendations:  1. Check blood pressure at least 1 hour after medications. Call the clinic if your blood pressure is consistently greater than 130/80.    2. Call if blood pressure is less than 90 on top or less than 100 with lightheadedness.    3. Call to schedule blood pressure check with [facility] pharmacist or primary care nurse visit for free. Please have them send results to cardiology. If you need to have your home blood pressure monitor checked for accuracy, bring that to the appt.   4. Can get [email or web address] mobil EKG to detect afib.     Follow-up:  1. 14 day zio patch in 1-2 weeks, to assess paroxysmal versus persistent, correlate symptoms, assess rate control  2. See Leta MELARA for cardiology follow up at [facility]: 1-2 months. Call to schedule. Can be virtual     Cardiology Scheduling~[phone]  Cardiology Clinic RN~[phone] (Princess Brown, RN and Veda WYLIE RN)          1. Aortic stenosis    Moderate EMILY 1.2 cm  2021    Follow with echo      2. Paroxysmal atrial fibrillation    Fatigue as possible symptom    Elevated [id number] score 3 for age, gender, hypertension    Continue Xarelto for anticoagulation    Start diltiazem for rate control    fatigue on metoprolol      3. Hypertension    Controlled    Continue losartan and start diltiazem    Fatigue with metoprolol                Thank you for allowing me to participate in this delightful patient's care.      This note was completed in part using Dragon voice recognition software. Although reviewed after completion, some word and grammatical errors may occur.    Leta Hall, APRN CNP, APRN, CNP           Data:   All laboratory data reviewed      Total time spent today was 47 mins, reviewing labs, testing, notes, documenting notes, and seeing patient.          Constitutional:  cooperative, alert and oriented, well developed, well nourished, in no acute distress  overweight      Skin:  warm and dry to the touch         Head:  normocephalic       Eyes:  pupils equal and round       ENT:  no pallor or cyanosis       Neck:  no stiffness       Respiratory:  clear to auscultation; normal symmetry        Cardiac: regular rate and irregularly irregular rhythm; normal S1 and S2                 pulses full and equal       GI:  abdomen soft, nondistended     Extremities and Muscular Skeletal:  no edema        Neurological:  affect appropriate; no gross motor deficits       Psych:  Alert and Oriented x 3 , appropriate affact

## 2022-01-28 ENCOUNTER — HOSPITAL ENCOUNTER (OUTPATIENT)
Dept: CARDIOLOGY | Facility: CLINIC | Age: 67
Discharge: HOME | End: 2022-01-28
Attending: NURSE PRACTITIONER | Admitting: NURSE PRACTITIONER
Payer: COMMERCIAL

## 2022-01-28 DIAGNOSIS — I48.0 PAROXYSMAL ATRIAL FIBRILLATION (H): ICD-10-CM

## 2022-01-28 PROCEDURE — 93246 EXT ECG>7D<15D RECORDING: CPT

## 2022-01-28 PROCEDURE — 93248 EXT ECG>7D<15D REV&INTERPJ: CPT | Performed by: INTERNAL MEDICINE

## 2022-03-09 NOTE — NURSING NOTE
"Chief Complaint   Patient presents with     URI     sx for 4 wks,sinus,pnd,cough. Completed Doxy aprox 8 days ago       Initial /70 (BP Location: Right arm, Patient Position: Chair, Cuff Size: Adult Regular)  Pulse 59  Temp 98.6  F (37  C) (Oral)  Wt 175 lb (79.4 kg)  LMP  (LMP Unknown)  SpO2 100%  BMI 31 kg/m2 Estimated body mass index is 31 kg/(m^2) as calculated from the following:    Height as of 3/7/17: 5' 3\" (1.6 m).    Weight as of this encounter: 175 lb (79.4 kg).  Medication Reconciliation: complete   Heath DAWKINS    " Physical Therapy     Referred by: Jalen Wall MD; Medical Diagnosis (from order):    Diagnosis Information      Diagnosis    V45.89 (ICD-9-CM) - Z98.890 (ICD-10-CM) - S/P kyphoplasty    721.3 (ICD-9-CM) - M47.817 (ICD-10-CM) - Lumbosacral spondylosis without myelopathy    729.5 (ICD-9-CM) - M79.662 (ICD-10-CM) - Pain of left lower leg    724.2, 724.3, 338.29 (ICD-9-CM) - M54.42, G89.29 (ICD-10-CM) - Chronic midline low back pain with left-sided sciatica                Daily Treatment Note    Visit:  2     SUBJECTIVE                                                                                                               Patient states that he is having mild foot pain overall.  Energy levels are slowly improving.   Pain / Symptoms:  Pain rating (out of 10): Current: 2     OBJECTIVE                                                                                                                        TREATMENT                                                                                                                  Therapeutic Exercise:  **Gait Belt applied for all standing activities **    Ambulation with gait belt and 2 WW x 50 feet x 2 (to and from NuStep)    NuStep level 2.0 x 3 minutes BUE at 9   -225 steps    Seated sciatic nerve glide (ankle pump/foot on stool)*  -2x10 ea  -more stretch on left side    Seated marching  2x 5   Alternating     Seated knee extension  2 x 5     Seated adductor ball set  2 x 5 with 5 sec hold    Seated clamshell  2 x 5 with 5 sec hold  Yellow band    -Cues on seated up right posture given throughout session.       Skilled input: verbal instruction/cues    Writer verbally educated and received verbal consent for hand placement, positioning of patient, and techniques to be performed today from patient for clothing adjustments for techniques, therapist position for techniques, hand placement and palpation for techniques and modality application as described above and how  they are pertinent to the patient's plan of care.    Home Exercise Program/Education Materials: *above indicates provided as part of home exercise program    Access Code: [email or web address]  URL: [email or web address]  Date: 03/09/2022  Prepared by: Justin Ferreira    Exercises  · Supine Sciatic Nerve Glide - 1 x daily - 7 x weekly - 2 sets - 10 reps  · Seated March - 1 x daily - 5-7 x weekly - 2 sets - 5-10 reps  · Seated Long Arc Quad - 1 x daily - 5-7 x weekly - 2 sets - 5-10 reps  · Seated Hip Adduction Isometrics with Ball - 1 x daily - 5-7 x weekly - 2 sets - 5-10 reps - 5 sec hold  · Seated Isometric Hip Abduction with Resistance - 1 x daily - 5-7 x weekly - 2 sets - 5-10 reps - 5 sec hold       ASSESSMENT                                                                                                             Initiated gentle core and lower extremity strengthening today with patient tolerating session well.  He does fatigued very quickly due to generalized deconditioning and because of this reps were kept very low.  Patient was able to ambulate 50 feet to and from the treatment room to the [facility] with CGA today.  Issued seated core strengthening with written handouts and yellow band for HEP.  Fatigued at the end of the session.   Pain/symptoms after session (out of 10): 0  Patient Education:   Results of above outlined education: Verbalizes understanding and Needs reinforcement    Patient progress, plan of care and goals discussed face to face between providing therapist and assistant.        PLAN                                                                                                                           Suggestions for next session as indicated: Progress per plan of care  Continue with seated core strengthening, short distance walks with 2WW and gait belt, NuStep.         Therapy procedure time and total treatment time can be found documented on the Time Entry flowsheet

## 2022-03-15 ENCOUNTER — OFFICE VISIT (OUTPATIENT)
Dept: CARDIOLOGY | Facility: CLINIC | Age: 67
End: 2022-03-15
Attending: NURSE PRACTITIONER
Payer: COMMERCIAL

## 2022-03-15 VITALS
OXYGEN SATURATION: 100 % | SYSTOLIC BLOOD PRESSURE: 118 MMHG | BODY MASS INDEX: 31.71 KG/M2 | HEART RATE: 101 BPM | DIASTOLIC BLOOD PRESSURE: 84 MMHG | WEIGHT: 179 LBS

## 2022-03-15 DIAGNOSIS — I10 BENIGN ESSENTIAL HYPERTENSION: ICD-10-CM

## 2022-03-15 DIAGNOSIS — I48.0 PAROXYSMAL ATRIAL FIBRILLATION (H): ICD-10-CM

## 2022-03-15 DIAGNOSIS — I35.0 MODERATE AORTIC STENOSIS: ICD-10-CM

## 2022-03-15 PROCEDURE — 99204 OFFICE O/P NEW MOD 45 MIN: CPT | Performed by: INTERNAL MEDICINE

## 2022-03-15 NOTE — PROGRESS NOTES
"Service Date: 03/15/2022    HISTORY OF PRESENT ILLNESS:  Thank you for allowing me to participate in the care of this very delightful patient.  As you know, Ilana is a 66-year-old female who used to work as at [employer] for about 40 years plus and has retired for the past 5 years now who was noted to be in atrial fibrillation this past August with symptoms of feeling \"fluttering\" which can wake her up at night.  She is known to have a normal LV systolic function with ejection fraction estimated to be at 60%-65% but does have moderate aortic stenosis with a mean gradient measured at 19 mmHg with a valve area measured at 1.2 cm2.    The patient has been on rate control strategy with diltiazem along with Xarelto.  Her most recent Zio Patch monitor demonstrated an average rate of 93 beats per minute, the lowest of 53 and a maximum of 220 beats per minute.  For some reason, she was asked to wear it for 14 days even though she has had chronic atrial fibrillation since August.    In any event, I was asked to see the patient for further evaluation of her atrial fibrillation.    We discussed at length about the potential causes of atrial fibrillation.  In this case, it could be related to her history of hypertension and age.  She does not have symptoms suggestive of obstructive sleep apnea nor does she have an endocrine abnormality including hyperthyroidism.  We also discussed potential complications of having AFib including CVA which is about 3% for her given her age, being a female and a history of hypertension.  We also discussed a small risk of tachycardia-induced cardiomyopathy.    Lastly, we discussed treatment options including continuing with rate control versus rhythm control strategy.  As she continues to have symptoms with adequate rate control, I would favor the latter.  I went over the option of antiarrhythmic drugs including amiodarone, as she does have a structural abnormality including aortic stenosis, and " therefore, class IC agent may not be a safe choice for her versus an ablation.  I would lean toward the latter given the AFib ablation can have the same success rate or higher than compared to antiarrhythmic drug.  The patient is scheduled to have a steroid injection, I believe, sometime next week and they requested to hold Xarelto for 3 days before the procedure.  I would like to take advantage of this time by having her continue to hold it off and then we will do an intraoperative LATRELL when we do the AFib ablation and we will resume Xarelto on the same day.  I went over the procedure in details with risks including but not limited to vascular injury and CVA less than 1%-2% of the time.  I quoted a success rate of about 65%-75% due to the persistent nature of atrial fibrillation.  I may have her on amiodarone for a short term afterward depending on the amount of scarring noted during the procedure.  There is no need for chest CT for convenience, as she lives quite far away from [facility].  We will contact the patient to schedule the procedure.    Sadi Hernandez MD        D: 03/15/2022   T: 03/15/2022   MT: abel    Name:     ARNOLD GUIDOGladys  MRN:      [id number]        Account:      [id number]   :      1955           Service Date: 03/15/2022       Document: [id number]

## 2022-03-15 NOTE — PROGRESS NOTES
HPI and Plan:   See dictation  [id number]  Today's clinic visit entailed:  The following tests were independently interpreted by me as noted in my documentation: ecg, zio. echo  15 minutes spent on the date of the encounter doing chart review   Provider  Link to MDM Help Grid     The level of medical decision making during this visit was of moderate complexity.      Orders Placed This Encounter   Procedures     LATRELL Only-Transesophageal Echocardiogram     Case Request EP: EP Ablation Focal AFIB       No orders of the defined types were placed in this encounter.      Medications Discontinued During This Encounter   Medication Reason     ibuprofen 200 MG capsule Stopped by Patient         Encounter Diagnoses   Name Primary?     Benign essential hypertension      Moderate aortic stenosis      Paroxysmal atrial fibrillation (H)        CURRENT MEDICATIONS:  Current Outpatient Medications   Medication Sig Dispense Refill     acetaminophen (TYLENOL) 325 MG tablet Take 2 tablets (650 mg) by mouth every 4 hours as needed for mild pain 50 tablet 0     amitriptyline (ELAVIL) 50 MG tablet Take 1 tablet (50 mg) by mouth At Bedtime (Patient taking differently: Take 25 mg by mouth At Bedtime ) 90 tablet 0     CALCIUM PO        diltiazem ER (DILT-XR) 120 MG 24 hr capsule Take 1 capsule (120 mg) by mouth daily 30 capsule 11     Ferrous Gluconate 240 (27 Fe) MG TABS Take by mouth every other day        fluticasone (FLONASE) 50 MCG/ACT nasal spray Spray 2 sprays into both nostrils daily (Patient taking differently: Spray 2 sprays into both nostrils daily as needed ) 16 g 0     fluticasone-salmeterol (ADVAIR) 100-50 MCG/DOSE inhaler Inhale 1 puff into the lungs every 12 hours (Patient taking differently: Inhale 1 puff into the lungs 2 times daily as needed ) 3 each 1     levothyroxine (SYNTHROID/LEVOTHROID) 100 MCG tablet TAKE 1 TABLET DAILY (MEDICATION IS BEING FILLED FOR 1 TIME ONLY. NEED TO BE SEEN. IT HAS BEEN MORE THAN 1 YEAR SINCE  LAST VISIT) 90 tablet 2     losartan (COZAAR) 100 MG tablet TAKE 1 TABLET DAILY (MEDICATION IS BEING FILLED FOR 1 TIME ONLY. NEED TO BE SEEN. IT HAS BEEN MORE THAN 1 YEAR SINCE LAST VISIT) 90 tablet 3     rivaroxaban ANTICOAGULANT (XARELTO ANTICOAGULANT) 20 MG TABS tablet Take 1 tablet (20 mg) by mouth daily (with dinner) (start AFTER completing 15mg twice a day x 21 days) (Patient taking differently: Take 20 mg by mouth daily (with dinner) ) 90 tablet 3       ALLERGIES     Allergies   Allergen Reactions     Codeine Sulfate GI Disturbance       PAST MEDICAL HISTORY:  Past Medical History:   Diagnosis Date     Benign essential hypertension      Bradycardia      CKD (chronic kidney disease), stage III (H)      Fibromyalgia      Hypothyroidism      Moderate aortic stenosis      Obesity (BMI 30-39.9)      Osteopenia      Pure hypercholesterolemia        PAST SURGICAL HISTORY:  Past Surgical History:   Procedure Laterality Date     ARTHROSCOPY KNEE WITH LATERAL MENISCECTOMY Right 3/8/2019    Procedure: RIGHT KNEE  ARTHROSCOPIC, CHONDROPLASTY OF THE MEDIAL CONDRA,  PARTIAL  LATERAL MENISCECTOMY;  Surgeon: Vinay Laird MD;  Location: [facility] OR     BUNIONECTOMY Right 8/2014     BUNIONECTOMY LADONNA, REPAIR HAMMER TOE(S), COMBINED  09/2003    Left foot     CHOLECYSTECTOMY, OPEN  1994     RELEASE CARPAL TUNNEL BILATERAL  1999     TUBAL LIGATION  1984    age 29     ZZC LAPAROSCOPIC JASS EN Y GASTROJEJUNOSTOMY  2006       FAMILY HISTORY:  Family History   Problem Relation Age of Onset     Hypertension Mother      Deep Vein Thrombosis (DVT) Mother         x2 - both provoked (trauma, surgery)     Diabetes Type 2  Father      Hypertension Father      Breast Cancer Paternal Grandmother      Breast Cancer Sister 45     Myocardial Infarction Sister 50     Deep Vein Thrombosis (DVT) Sister         provoked (surgery)     Lung Cancer Brother         smoker     Hypertension Sister         x2     Cerebrovascular Disease No family hx of       Colon Cancer No family hx of      Ovarian Cancer No family hx of        SOCIAL HISTORY:  Social History     Socioeconomic History     Marital status:      Spouse name: None     Number of children: None     Years of education: None     Highest education level: None   Occupational History     Occupation: Retired - instrument tech   Tobacco Use     Smoking status: Former Smoker     Packs/day: 1.00     Years: 20.00     Pack years: 20.00     Types: Cigarettes     Quit date: 2002     Years since quittin.2     Smokeless tobacco: Never Used   Substance and Sexual Activity     Alcohol use: No     Drug use: No     Sexual activity: Not Currently   Other Topics Concern     Parent/sibling w/ CABG, MI or angioplasty before 65F 55M? No      Service Not Asked     Blood Transfusions Not Asked     Caffeine Concern Not Asked     Occupational Exposure Not Asked     Hobby Hazards Not Asked     Sleep Concern Not Asked     Stress Concern Not Asked     Weight Concern Not Asked     Special Diet Not Asked     Back Care Not Asked     Exercise Not Asked     Bike Helmet Yes     Seat Belt Yes     Self-Exams Not Asked   Social History Narrative    .    Lives with  and two grandchildren.     2 adult kids.    5 grand kids.    Walks regularly.      Social Determinants of Health     Financial Resource Strain: Not on file   Food Insecurity: Not on file   Transportation Needs: Not on file   Physical Activity: Not on file   Stress: Not on file   Social Connections: Not on file   Intimate Partner Violence: Not on file   Housing Stability: Not on file       Review of Systems:  Skin:  Negative       Eyes:  Negative      ENT:  Negative      Respiratory:  Positive for dyspnea on exertion     Cardiovascular:  Negative for;chest pain;edema;syncope or near-syncope;cyanosis;exercise intolerance;fatigue;dizziness Positive for;palpitations;lightheadedness    Gastroenterology: Negative      Genitourinary:  Positive for    CKD  Musculoskeletal:  Positive for back pain;neck pain;joint pain;fibromyalgia shoulder pains  Neurologic:  Positive for numbness or tingling of feet    Psychiatric:  Negative      Heme/Lymph/Imm:  Positive for night sweats chest only some nights  Endocrine:  Positive for thyroid disorder      Physical Exam:  Vitals: /84 (BP Location: Right arm, Patient Position: Sitting, Cuff Size: Adult Regular)   Pulse 101   Wt 81.2 kg (179 lb)   LMP  (LMP Unknown)   SpO2 100%   BMI 31.71 kg/m      Constitutional:  cooperative, alert and oriented, well developed, well nourished, in no acute distress overweight      Skin:  warm and dry to the touch, no apparent skin lesions or masses noted          Head:  normocephalic, no masses or lesions        Eyes:  pupils equal and round, conjunctivae and lids unremarkable, sclera white, no xanthalasma, EOMS intact, no nystagmus        Lymph:No Cervical lymphadenopathy present     ENT:  no pallor or cyanosis        Neck:  carotid pulses are full and equal bilaterally, JVP normal, no carotid bruit        Respiratory:  normal breath sounds, clear to auscultation, normal A-P diameter, normal symmetry, normal respiratory excursion, no use of accessory muscles         Cardiac:   irregularly irregular rhythm   no presence of murmur          pulses full and equal, no bruits auscultated                                        GI:  abdomen soft, non-tender, BS normoactive, no mass, no HSM, no bruits        Extremities and Muscular Skeletal:  no deformities, clubbing, cyanosis, erythema observed              Neurological:  no gross motor deficits        Psych:  Alert and Oriented x 3        CC  Leta Mcclelland, MAURO CNP  [address]  [place] [id number]

## 2022-03-15 NOTE — H&P (VIEW-ONLY)
HPI and Plan:   See dictation  [id number]  Today's clinic visit entailed:  The following tests were independently interpreted by me as noted in my documentation: ecg, zio. echo  15 minutes spent on the date of the encounter doing chart review   Provider  Link to MDM Help Grid     The level of medical decision making during this visit was of moderate complexity.      Orders Placed This Encounter   Procedures     LATRELL Only-Transesophageal Echocardiogram     Case Request EP: EP Ablation Focal AFIB       No orders of the defined types were placed in this encounter.      Medications Discontinued During This Encounter   Medication Reason     ibuprofen 200 MG capsule Stopped by Patient         Encounter Diagnoses   Name Primary?     Benign essential hypertension      Moderate aortic stenosis      Paroxysmal atrial fibrillation (H)        CURRENT MEDICATIONS:  Current Outpatient Medications   Medication Sig Dispense Refill     acetaminophen (TYLENOL) 325 MG tablet Take 2 tablets (650 mg) by mouth every 4 hours as needed for mild pain 50 tablet 0     amitriptyline (ELAVIL) 50 MG tablet Take 1 tablet (50 mg) by mouth At Bedtime (Patient taking differently: Take 25 mg by mouth At Bedtime ) 90 tablet 0     CALCIUM PO        diltiazem ER (DILT-XR) 120 MG 24 hr capsule Take 1 capsule (120 mg) by mouth daily 30 capsule 11     Ferrous Gluconate 240 (27 Fe) MG TABS Take by mouth every other day        fluticasone (FLONASE) 50 MCG/ACT nasal spray Spray 2 sprays into both nostrils daily (Patient taking differently: Spray 2 sprays into both nostrils daily as needed ) 16 g 0     fluticasone-salmeterol (ADVAIR) 100-50 MCG/DOSE inhaler Inhale 1 puff into the lungs every 12 hours (Patient taking differently: Inhale 1 puff into the lungs 2 times daily as needed ) 3 each 1     levothyroxine (SYNTHROID/LEVOTHROID) 100 MCG tablet TAKE 1 TABLET DAILY (MEDICATION IS BEING FILLED FOR 1 TIME ONLY. NEED TO BE SEEN. IT HAS BEEN MORE THAN 1 YEAR SINCE  LAST VISIT) 90 tablet 2     losartan (COZAAR) 100 MG tablet TAKE 1 TABLET DAILY (MEDICATION IS BEING FILLED FOR 1 TIME ONLY. NEED TO BE SEEN. IT HAS BEEN MORE THAN 1 YEAR SINCE LAST VISIT) 90 tablet 3     rivaroxaban ANTICOAGULANT (XARELTO ANTICOAGULANT) 20 MG TABS tablet Take 1 tablet (20 mg) by mouth daily (with dinner) (start AFTER completing 15mg twice a day x 21 days) (Patient taking differently: Take 20 mg by mouth daily (with dinner) ) 90 tablet 3       ALLERGIES     Allergies   Allergen Reactions     Codeine Sulfate GI Disturbance       PAST MEDICAL HISTORY:  Past Medical History:   Diagnosis Date     Benign essential hypertension      Bradycardia      CKD (chronic kidney disease), stage III (H)      Fibromyalgia      Hypothyroidism      Moderate aortic stenosis      Obesity (BMI 30-39.9)      Osteopenia      Pure hypercholesterolemia        PAST SURGICAL HISTORY:  Past Surgical History:   Procedure Laterality Date     ARTHROSCOPY KNEE WITH LATERAL MENISCECTOMY Right 3/8/2019    Procedure: RIGHT KNEE  ARTHROSCOPIC, CHONDROPLASTY OF THE MEDIAL CONDRA,  PARTIAL  LATERAL MENISCECTOMY;  Surgeon: Vinay Laird MD;  Location: [facility]     BUNIONECTOMY Right 8/2014     BUNIONECTOMY [place], REPAIR HAMMER TOE(S), COMBINED  09/2003    Left foot     CHOLECYSTECTOMY, OPEN  1994     RELEASE CARPAL TUNNEL BILATERAL  1999     TUBAL LIGATION  1984    age 29     ZZC LAPAROSCOPIC JASS EN Y GASTROJEJUNOSTOMY  2006       FAMILY HISTORY:  Family History   Problem Relation Age of Onset     Hypertension Mother      Deep Vein Thrombosis (DVT) Mother         x2 - both provoked (trauma, surgery)     Diabetes Type 2  Father      Hypertension Father      Breast Cancer Paternal Grandmother      Breast Cancer Sister 45     Myocardial Infarction Sister 50     Deep Vein Thrombosis (DVT) Sister         provoked (surgery)     Lung Cancer Brother         smoker     Hypertension Sister         x2     Cerebrovascular Disease No family hx of       Colon Cancer No family hx of      Ovarian Cancer No family hx of        SOCIAL HISTORY:  Social History     Socioeconomic History     Marital status:      Spouse name: None     Number of children: None     Years of education: None     Highest education level: None   Occupational History     Occupation: Retired - instrument tech   Tobacco Use     Smoking status: Former Smoker     Packs/day: 1.00     Years: 20.00     Pack years: 20.00     Types: Cigarettes     Quit date: 2002     Years since quittin.2     Smokeless tobacco: Never Used   Substance and Sexual Activity     Alcohol use: No     Drug use: No     Sexual activity: Not Currently   Other Topics Concern     Parent/sibling w/ CABG, MI or angioplasty before 65F 55M? No      Service Not Asked     Blood Transfusions Not Asked     Caffeine Concern Not Asked     Occupational Exposure Not Asked     Hobby Hazards Not Asked     Sleep Concern Not Asked     Stress Concern Not Asked     Weight Concern Not Asked     Special Diet Not Asked     Back Care Not Asked     Exercise Not Asked     Bike Helmet Yes     Seat Belt Yes     Self-Exams Not Asked   Social History Narrative    .    Lives with  and two grandchildren.     2 adult kids.    5 grand kids.    Walks regularly.      Social Determinants of Health     Financial Resource Strain: Not on file   Food Insecurity: Not on file   Transportation Needs: Not on file   Physical Activity: Not on file   Stress: Not on file   Social Connections: Not on file   Intimate Partner Violence: Not on file   Housing Stability: Not on file       Review of Systems:  Skin:  Negative       Eyes:  Negative      ENT:  Negative      Respiratory:  Positive for dyspnea on exertion     Cardiovascular:  Negative for;chest pain;edema;syncope or near-syncope;cyanosis;exercise intolerance;fatigue;dizziness Positive for;palpitations;lightheadedness    Gastroenterology: Negative      Genitourinary:  Positive for    CKD  Musculoskeletal:  Positive for back pain;neck pain;joint pain;fibromyalgia shoulder pains  Neurologic:  Positive for numbness or tingling of feet    Psychiatric:  Negative      Heme/Lymph/Imm:  Positive for night sweats chest only some nights  Endocrine:  Positive for thyroid disorder      Physical Exam:  Vitals: /84 (BP Location: Right arm, Patient Position: Sitting, Cuff Size: Adult Regular)   Pulse 101   Wt 81.2 kg (179 lb)   LMP  (LMP Unknown)   SpO2 100%   BMI 31.71 kg/m      Constitutional:  cooperative, alert and oriented, well developed, well nourished, in no acute distress overweight      Skin:  warm and dry to the touch, no apparent skin lesions or masses noted          Head:  normocephalic, no masses or lesions        Eyes:  pupils equal and round, conjunctivae and lids unremarkable, sclera white, no xanthalasma, EOMS intact, no nystagmus        Lymph:No Cervical lymphadenopathy present     ENT:  no pallor or cyanosis        Neck:  carotid pulses are full and equal bilaterally, JVP normal, no carotid bruit        Respiratory:  normal breath sounds, clear to auscultation, normal A-P diameter, normal symmetry, normal respiratory excursion, no use of accessory muscles         Cardiac:   irregularly irregular rhythm   no presence of murmur          pulses full and equal, no bruits auscultated                                        GI:  abdomen soft, non-tender, BS normoactive, no mass, no HSM, no bruits        Extremities and Muscular Skeletal:  no deformities, clubbing, cyanosis, erythema observed              Neurological:  no gross motor deficits        Psych:  Alert and Oriented x 3        CC  Leta Mcclelland, MAURO CNP  [address]  [place] [id number]

## 2022-03-15 NOTE — LETTER
3/15/2022    Prema Rodgers MD  [address] [facility] [address]    RE: Ilana Shin       Dear Colleague,     I had the pleasure of seeing Ilana Shin in the [facility].  HPI and Plan:   See dictation  [id number]  Today's clinic visit entailed:  The following tests were independently interpreted by me as noted in my documentation: ecg, zio. echo  15 minutes spent on the date of the encounter doing chart review   Provider  Link to MDM Help Grid     The level of medical decision making during this visit was of moderate complexity.      Orders Placed This Encounter   Procedures     LATRELL Only-Transesophageal Echocardiogram     Case Request EP: EP Ablation Focal AFIB       No orders of the defined types were placed in this encounter.      Medications Discontinued During This Encounter   Medication Reason     ibuprofen 200 MG capsule Stopped by Patient         Encounter Diagnoses   Name Primary?     Benign essential hypertension      Moderate aortic stenosis      Paroxysmal atrial fibrillation (H)        CURRENT MEDICATIONS:  Current Outpatient Medications   Medication Sig Dispense Refill     acetaminophen (TYLENOL) 325 MG tablet Take 2 tablets (650 mg) by mouth every 4 hours as needed for mild pain 50 tablet 0     amitriptyline (ELAVIL) 50 MG tablet Take 1 tablet (50 mg) by mouth At Bedtime (Patient taking differently: Take 25 mg by mouth At Bedtime ) 90 tablet 0     CALCIUM PO        diltiazem ER (DILT-XR) 120 MG 24 hr capsule Take 1 capsule (120 mg) by mouth daily 30 capsule 11     Ferrous Gluconate 240 (27 Fe) MG TABS Take by mouth every other day        fluticasone (FLONASE) 50 MCG/ACT nasal spray Spray 2 sprays into both nostrils daily (Patient taking differently: Spray 2 sprays into both nostrils daily as needed ) 16 g 0     fluticasone-salmeterol (ADVAIR) 100-50 MCG/DOSE inhaler Inhale 1 puff into the lungs every 12 hours (Patient taking differently: Inhale 1 puff into the lungs 2 times  daily as needed ) 3 each 1     levothyroxine (SYNTHROID/LEVOTHROID) 100 MCG tablet TAKE 1 TABLET DAILY (MEDICATION IS BEING FILLED FOR 1 TIME ONLY. NEED TO BE SEEN. IT HAS BEEN MORE THAN 1 YEAR SINCE LAST VISIT) 90 tablet 2     losartan (COZAAR) 100 MG tablet TAKE 1 TABLET DAILY (MEDICATION IS BEING FILLED FOR 1 TIME ONLY. NEED TO BE SEEN. IT HAS BEEN MORE THAN 1 YEAR SINCE LAST VISIT) 90 tablet 3     rivaroxaban ANTICOAGULANT (XARELTO ANTICOAGULANT) 20 MG TABS tablet Take 1 tablet (20 mg) by mouth daily (with dinner) (start AFTER completing 15mg twice a day x 21 days) (Patient taking differently: Take 20 mg by mouth daily (with dinner) ) 90 tablet 3       ALLERGIES     Allergies   Allergen Reactions     Codeine Sulfate GI Disturbance       PAST MEDICAL HISTORY:  Past Medical History:   Diagnosis Date     Benign essential hypertension      Bradycardia      CKD (chronic kidney disease), stage III (H)      Fibromyalgia      Hypothyroidism      Moderate aortic stenosis      Obesity (BMI 30-39.9)      Osteopenia      Pure hypercholesterolemia        PAST SURGICAL HISTORY:  Past Surgical History:   Procedure Laterality Date     ARTHROSCOPY KNEE WITH LATERAL MENISCECTOMY Right 3/8/2019    Procedure: RIGHT KNEE  ARTHROSCOPIC, CHONDROPLASTY OF THE MEDIAL CONDRA,  PARTIAL  LATERAL MENISCECTOMY;  Surgeon: Vinay Laird MD;  Location: [facility]     BUNIONECTOMY Right 8/2014     BUNIONECTOMY [place], REPAIR HAMMER TOE(S), COMBINED  09/2003    Left foot     CHOLECYSTECTOMY, OPEN  1994     RELEASE CARPAL TUNNEL BILATERAL  1999     TUBAL LIGATION  1984    age 29     ZZC LAPAROSCOPIC JASS EN Y GASTROJEJUNOSTOMY  2006       FAMILY HISTORY:  Family History   Problem Relation Age of Onset     Hypertension Mother      Deep Vein Thrombosis (DVT) Mother         x2 - both provoked (trauma, surgery)     Diabetes Type 2  Father      Hypertension Father      Breast Cancer Paternal Grandmother      Breast Cancer Sister 45     Myocardial  Infarction Sister 50     Deep Vein Thrombosis (DVT) Sister         provoked (surgery)     Lung Cancer Brother         smoker     Hypertension Sister         x2     Cerebrovascular Disease No family hx of      Colon Cancer No family hx of      Ovarian Cancer No family hx of        SOCIAL HISTORY:  Social History     Socioeconomic History     Marital status:      Spouse name: None     Number of children: None     Years of education: None     Highest education level: None   Occupational History     Occupation: Retired - instrument tech   Tobacco Use     Smoking status: Former Smoker     Packs/day: 1.00     Years: 20.00     Pack years: 20.00     Types: Cigarettes     Quit date: 2002     Years since quittin.2     Smokeless tobacco: Never Used   Substance and Sexual Activity     Alcohol use: No     Drug use: No     Sexual activity: Not Currently   Other Topics Concern     Parent/sibling w/ CABG, MI or angioplasty before 65F 55M? No      Service Not Asked     Blood Transfusions Not Asked     Caffeine Concern Not Asked     Occupational Exposure Not Asked     Hobby Hazards Not Asked     Sleep Concern Not Asked     Stress Concern Not Asked     Weight Concern Not Asked     Special Diet Not Asked     Back Care Not Asked     Exercise Not Asked     Bike Helmet Yes     Seat Belt Yes     Self-Exams Not Asked   Social History Narrative    .    Lives with  and two grandchildren.     2 adult kids.    5 grand kids.    Walks regularly.      Social Determinants of Health     Financial Resource Strain: Not on file   Food Insecurity: Not on file   Transportation Needs: Not on file   Physical Activity: Not on file   Stress: Not on file   Social Connections: Not on file   Intimate Partner Violence: Not on file   Housing Stability: Not on file       Review of Systems:  Skin:  Negative       Eyes:  Negative      ENT:  Negative      Respiratory:  Positive for dyspnea on exertion     Cardiovascular:   Negative for;chest pain;edema;syncope or near-syncope;cyanosis;exercise intolerance;fatigue;dizziness Positive for;palpitations;lightheadedness    Gastroenterology: Negative      Genitourinary:  Positive for   CKD  Musculoskeletal:  Positive for back pain;neck pain;joint pain;fibromyalgia shoulder pains  Neurologic:  Positive for numbness or tingling of feet    Psychiatric:  Negative      Heme/Lymph/Imm:  Positive for night sweats chest only some nights  Endocrine:  Positive for thyroid disorder      Physical Exam:  Vitals: /84 (BP Location: Right arm, Patient Position: Sitting, Cuff Size: Adult Regular)   Pulse 101   Wt 81.2 kg (179 lb)   LMP  (LMP Unknown)   SpO2 100%   BMI 31.71 kg/m      Constitutional:  cooperative, alert and oriented, well developed, well nourished, in no acute distress overweight      Skin:  warm and dry to the touch, no apparent skin lesions or masses noted          Head:  normocephalic, no masses or lesions        Eyes:  pupils equal and round, conjunctivae and lids unremarkable, sclera white, no xanthalasma, EOMS intact, no nystagmus        Lymph:No Cervical lymphadenopathy present     ENT:  no pallor or cyanosis        Neck:  carotid pulses are full and equal bilaterally, JVP normal, no carotid bruit        Respiratory:  normal breath sounds, clear to auscultation, normal A-P diameter, normal symmetry, normal respiratory excursion, no use of accessory muscles         Cardiac:   irregularly irregular rhythm   no presence of murmur          pulses full and equal, no bruits auscultated                                        GI:  abdomen soft, non-tender, BS normoactive, no mass, no HSM, no bruits        Extremities and Muscular Skeletal:  no deformities, clubbing, cyanosis, erythema observed              Neurological:  no gross motor deficits        Psych:  Alert and Oriented x 3        CC  Leta Mcclelland, APRN CNP  [address]  [place] [id number]    Thank you  for allowing me to participate in the care of your patient.      Sincerely,     Sadi Fournier MD     [facility]

## 2022-03-15 NOTE — NURSING NOTE
"Initial /84 (BP Location: Right arm, Patient Position: Sitting, Cuff Size: Adult Regular)   Pulse 101   Wt 81.2 kg (179 lb)   LMP  (LMP Unknown)   SpO2 100%   BMI 31.71 kg/m   Estimated body mass index is 31.71 kg/m  as calculated from the following:    Height as of 8/17/21: 1.6 m (5' 3\").    Weight as of this encounter: 81.2 kg (179 lb).  Tammy GUERREROA      "

## 2022-03-16 ENCOUNTER — TELEPHONE (OUTPATIENT)
Dept: CARDIOLOGY | Facility: CLINIC | Age: 67
End: 2022-03-16
Payer: COMMERCIAL

## 2022-03-16 DIAGNOSIS — Z11.59 ENCOUNTER FOR SCREENING FOR OTHER VIRAL DISEASES: Primary | ICD-10-CM

## 2022-03-16 DIAGNOSIS — I48.0 PAROXYSMAL ATRIAL FIBRILLATION (H): Primary | ICD-10-CM

## 2022-03-16 NOTE — TELEPHONE ENCOUNTER
Pt aware that order for Covid test was faxed to [facility] in [place].  Matt

## 2022-03-16 NOTE — TELEPHONE ENCOUNTER
[facility]    Phone Message    May a detailed message be left on voicemail: yes     Reason for Call: Other: Pt needs the orders for her covid test faxed over to [facility] in [place] , fax number [phone]     Action Taken: Message routed to:  [facility] Cardio    Travel Screening: Not Applicable

## 2022-03-20 ENCOUNTER — LAB (OUTPATIENT)
Dept: FAMILY MEDICINE | Facility: CLINIC | Age: 67
End: 2022-03-20
Attending: INTERNAL MEDICINE
Payer: COMMERCIAL

## 2022-03-20 DIAGNOSIS — I48.0 PAROXYSMAL ATRIAL FIBRILLATION (H): ICD-10-CM

## 2022-03-20 PROCEDURE — U0005 INFEC AGEN DETEC AMPLI PROBE: HCPCS

## 2022-03-20 PROCEDURE — 99207 PR NO CHARGE LOS: CPT

## 2022-03-20 PROCEDURE — U0003 INFECTIOUS AGENT DETECTION BY NUCLEIC ACID (DNA OR RNA); SEVERE ACUTE RESPIRATORY SYNDROME CORONAVIRUS 2 (SARS-COV-2) (CORONAVIRUS DISEASE [COVID-19]), AMPLIFIED PROBE TECHNIQUE, MAKING USE OF HIGH THROUGHPUT TECHNOLOGIES AS DESCRIBED BY CMS-2020-01-R: HCPCS

## 2022-03-21 LAB — SARS-COV-2 RNA RESP QL NAA+PROBE: NEGATIVE

## 2022-03-22 ENCOUNTER — TELEPHONE (OUTPATIENT)
Dept: CARDIOLOGY | Facility: CLINIC | Age: 67
End: 2022-03-22
Payer: COMMERCIAL

## 2022-03-22 DIAGNOSIS — I48.0 PAROXYSMAL ATRIAL FIBRILLATION (H): Primary | ICD-10-CM

## 2022-03-22 RX ORDER — SODIUM CHLORIDE, SODIUM LACTATE, POTASSIUM CHLORIDE, CALCIUM CHLORIDE 600; 310; 30; 20 MG/100ML; MG/100ML; MG/100ML; MG/100ML
INJECTION, SOLUTION INTRAVENOUS CONTINUOUS
Status: CANCELLED | OUTPATIENT
Start: 2022-03-22

## 2022-03-22 RX ORDER — LIDOCAINE 40 MG/G
CREAM TOPICAL
Status: CANCELLED | OUTPATIENT
Start: 2022-03-22

## 2022-03-22 NOTE — TELEPHONE ENCOUNTER
Spoke with patient and clarified time of procedure and estimate of how long she will be there.  Patient had no further questions.    Veda Mondragon RN on 3/22/2022 at 10:17 AM

## 2022-03-22 NOTE — TELEPHONE ENCOUNTER
[facility]    Phone Message    May a detailed message be left on voicemail: yes     Reason for Call: Other: Pt would like a call back as she has a few procedure questions as far as time, how it works, how long it takes, PLease reach out to pt to discuss     Action Taken: Message routed to:  [facility] Cardio    Travel Screening: Not Applicable

## 2022-03-23 ENCOUNTER — ANESTHESIA EVENT (OUTPATIENT)
Dept: CARDIOLOGY | Facility: CLINIC | Age: 67
End: 2022-03-23
Payer: COMMERCIAL

## 2022-03-23 ENCOUNTER — TELEPHONE (OUTPATIENT)
Dept: MEDSURG UNIT | Facility: CLINIC | Age: 67
End: 2022-03-23
Payer: COMMERCIAL

## 2022-03-23 NOTE — TELEPHONE ENCOUNTER
Message left for patient to review instructions for scheduled afib ablation tomorrow 3/24.  Awaiting return call.  SALVADOR Billings

## 2022-03-23 NOTE — TELEPHONE ENCOUNTER
Chart reviewed for upcoming procedure tomorrow.  -Order in.  -CSE  -LATRELL in EP prior to A-fib ablation d/t Xarelto on hold for recent MANN injection.  -3/20 Covid negative.

## 2022-03-23 NOTE — TELEPHONE ENCOUNTER
Spoke to patient to review instructions for afib ablation scheduled for 3/24.  Patient aware that she is to be NPO after midnight and may take sips of water with am medications. Due to later procedure patient may have clear liquids until 8am.  Patient to arrive at 10:30am.  Patient aware that she will NOT be staying overnight after procedure unless there is a complication.  Patient has arranged for ride and someone to be with her for the firate 24 hours.  COVID test NEGATIVE.  Patient provided verbal understanding regarding above.  SALVADOR Billings

## 2022-03-24 ENCOUNTER — HOSPITAL ENCOUNTER (OUTPATIENT)
Dept: CARDIOLOGY | Facility: CLINIC | Age: 67
Discharge: HOME | End: 2022-03-24
Attending: INTERNAL MEDICINE | Admitting: INTERNAL MEDICINE
Payer: COMMERCIAL

## 2022-03-24 ENCOUNTER — ANESTHESIA (OUTPATIENT)
Dept: CARDIOLOGY | Facility: CLINIC | Age: 67
End: 2022-03-24
Payer: COMMERCIAL

## 2022-03-24 ENCOUNTER — HOSPITAL ENCOUNTER (OUTPATIENT)
Facility: CLINIC | Age: 67
Discharge: HOME | End: 2022-03-24
Admitting: INTERNAL MEDICINE
Payer: COMMERCIAL

## 2022-03-24 VITALS
HEIGHT: 63 IN | BODY MASS INDEX: 31.18 KG/M2 | DIASTOLIC BLOOD PRESSURE: 72 MMHG | OXYGEN SATURATION: 95 % | TEMPERATURE: 97.8 F | HEART RATE: 71 BPM | WEIGHT: 176 LBS | SYSTOLIC BLOOD PRESSURE: 119 MMHG | RESPIRATION RATE: 18 BRPM

## 2022-03-24 DIAGNOSIS — I48.0 PAROXYSMAL ATRIAL FIBRILLATION (H): ICD-10-CM

## 2022-03-24 LAB
ACT BLD: 297 SECONDS (ref 74–150)
ACT BLD: 411 SECONDS (ref 74–150)
ANION GAP SERPL CALCULATED.3IONS-SCNC: 7 MMOL/L (ref 3–14)
BUN SERPL-MCNC: 17 MG/DL (ref 7–30)
CALCIUM SERPL-MCNC: 9.5 MG/DL (ref 8.5–10.1)
CHLORIDE BLD-SCNC: 110 MMOL/L (ref 94–109)
CO2 SERPL-SCNC: 23 MMOL/L (ref 20–32)
CREAT SERPL-MCNC: 1.08 MG/DL (ref 0.52–1.04)
ERYTHROCYTE [DISTWIDTH] IN BLOOD BY AUTOMATED COUNT: 13.1 % (ref 10–15)
GFR SERPL CREATININE-BSD FRML MDRD: 56 ML/MIN/1.73M2
GLUCOSE BLD-MCNC: 114 MG/DL (ref 70–99)
HCT VFR BLD AUTO: 40.4 % (ref 35–47)
HGB BLD-MCNC: 13.4 G/DL (ref 11.7–15.7)
LACTATE SERPL-SCNC: 1 MMOL/L (ref 0.7–2)
LVEF ECHO: NORMAL
MCH RBC QN AUTO: 29.8 PG (ref 26.5–33)
MCHC RBC AUTO-ENTMCNC: 33.2 G/DL (ref 31.5–36.5)
MCV RBC AUTO: 90 FL (ref 78–100)
PLATELET # BLD AUTO: 208 10E3/UL (ref 150–450)
POTASSIUM BLD-SCNC: 3.8 MMOL/L (ref 3.4–5.3)
RBC # BLD AUTO: 4.5 10E6/UL (ref 3.8–5.2)
SODIUM SERPL-SCNC: 140 MMOL/L (ref 133–144)
WBC # BLD AUTO: 7.7 10E3/UL (ref 4–11)

## 2022-03-24 PROCEDURE — C1766 INTRO/SHEATH,STRBLE,NON-PEEL: HCPCS | Performed by: INTERNAL MEDICINE

## 2022-03-24 PROCEDURE — 370N000017 HC ANESTHESIA TECHNICAL FEE, PER MIN: Performed by: INTERNAL MEDICINE

## 2022-03-24 PROCEDURE — 250N000011 HC RX IP 250 OP 636

## 2022-03-24 PROCEDURE — 93655 ICAR CATH ABLTJ DSCRT ARRHYT: CPT | Performed by: INTERNAL MEDICINE

## 2022-03-24 PROCEDURE — 250N000009 HC RX 250: Performed by: INTERNAL MEDICINE

## 2022-03-24 PROCEDURE — 258N000003 HC RX IP 258 OP 636: Performed by: NURSE ANESTHETIST, CERTIFIED REGISTERED

## 2022-03-24 PROCEDURE — 258N000003 HC RX IP 258 OP 636: Performed by: INTERNAL MEDICINE

## 2022-03-24 PROCEDURE — C1730 CATH, EP, 19 OR FEW ELECT: HCPCS | Performed by: INTERNAL MEDICINE

## 2022-03-24 PROCEDURE — 258N000003 HC RX IP 258 OP 636

## 2022-03-24 PROCEDURE — 99152 MOD SED SAME PHYS/QHP 5/>YRS: CPT | Performed by: INTERNAL MEDICINE

## 2022-03-24 PROCEDURE — 250N000011 HC RX IP 250 OP 636: Performed by: INTERNAL MEDICINE

## 2022-03-24 PROCEDURE — 83605 ASSAY OF LACTIC ACID: CPT | Performed by: INTERNAL MEDICINE

## 2022-03-24 PROCEDURE — 250N000025 HC SEVOFLURANE, PER MIN: Performed by: INTERNAL MEDICINE

## 2022-03-24 PROCEDURE — 272N000001 HC OR GENERAL SUPPLY STERILE: Performed by: INTERNAL MEDICINE

## 2022-03-24 PROCEDURE — 93312 ECHO TRANSESOPHAGEAL: CPT | Mod: 26 | Performed by: INTERNAL MEDICINE

## 2022-03-24 PROCEDURE — 80048 BASIC METABOLIC PNL TOTAL CA: CPT | Performed by: INTERNAL MEDICINE

## 2022-03-24 PROCEDURE — 250N000011 HC RX IP 250 OP 636: Performed by: NURSE ANESTHETIST, CERTIFIED REGISTERED

## 2022-03-24 PROCEDURE — 93320 DOPPLER ECHO COMPLETE: CPT | Mod: 26 | Performed by: INTERNAL MEDICINE

## 2022-03-24 PROCEDURE — 93005 ELECTROCARDIOGRAM TRACING: CPT

## 2022-03-24 PROCEDURE — 93325 DOPPLER ECHO COLOR FLOW MAPG: CPT

## 2022-03-24 PROCEDURE — C1759 CATH, INTRA ECHOCARDIOGRAPHY: HCPCS | Performed by: INTERNAL MEDICINE

## 2022-03-24 PROCEDURE — 85347 COAGULATION TIME ACTIVATED: CPT | Mod: 91

## 2022-03-24 PROCEDURE — 93656 COMPRE EP EVAL ABLTJ ATR FIB: CPT | Performed by: INTERNAL MEDICINE

## 2022-03-24 PROCEDURE — 999N000184 HC STATISTIC TELEMETRY

## 2022-03-24 PROCEDURE — 36415 COLL VENOUS BLD VENIPUNCTURE: CPT | Performed by: INTERNAL MEDICINE

## 2022-03-24 PROCEDURE — 36591 DRAW BLOOD OFF VENOUS DEVICE: CPT

## 2022-03-24 PROCEDURE — 250N000013 HC RX MED GY IP 250 OP 250 PS 637: Performed by: INTERNAL MEDICINE

## 2022-03-24 PROCEDURE — 999N000071 HC STATISTIC HEART CATH LAB OR EP LAB

## 2022-03-24 PROCEDURE — 93010 ELECTROCARDIOGRAM REPORT: CPT | Performed by: INTERNAL MEDICINE

## 2022-03-24 PROCEDURE — 85014 HEMATOCRIT: CPT | Performed by: INTERNAL MEDICINE

## 2022-03-24 PROCEDURE — C1732 CATH, EP, DIAG/ABL, 3D/VECT: HCPCS | Performed by: INTERNAL MEDICINE

## 2022-03-24 PROCEDURE — C1887 CATHETER, GUIDING: HCPCS | Performed by: INTERNAL MEDICINE

## 2022-03-24 PROCEDURE — C1733 CATH, EP, OTHR THAN COOL-TIP: HCPCS | Performed by: INTERNAL MEDICINE

## 2022-03-24 PROCEDURE — 93325 DOPPLER ECHO COLOR FLOW MAPG: CPT | Mod: 26 | Performed by: INTERNAL MEDICINE

## 2022-03-24 PROCEDURE — 250N000009 HC RX 250

## 2022-03-24 PROCEDURE — 250N000009 HC RX 250: Performed by: NURSE ANESTHETIST, CERTIFIED REGISTERED

## 2022-03-24 RX ORDER — NALOXONE HYDROCHLORIDE 0.4 MG/ML
0.4 INJECTION, SOLUTION INTRAMUSCULAR; INTRAVENOUS; SUBCUTANEOUS
Status: DISCONTINUED | OUTPATIENT
Start: 2022-03-24 | End: 2022-03-24 | Stop reason: HOSPADM

## 2022-03-24 RX ORDER — LIDOCAINE HYDROCHLORIDE 20 MG/ML
INJECTION, SOLUTION INFILTRATION; PERINEURAL PRN
Status: DISCONTINUED | OUTPATIENT
Start: 2022-03-24 | End: 2022-03-24

## 2022-03-24 RX ORDER — OXYCODONE AND ACETAMINOPHEN 5; 325 MG/1; MG/1
1 TABLET ORAL EVERY 4 HOURS PRN
Status: DISCONTINUED | OUTPATIENT
Start: 2022-03-24 | End: 2022-03-24 | Stop reason: HOSPADM

## 2022-03-24 RX ORDER — HYDROMORPHONE HYDROCHLORIDE 1 MG/ML
0.2 INJECTION, SOLUTION INTRAMUSCULAR; INTRAVENOUS; SUBCUTANEOUS EVERY 5 MIN PRN
Status: DISCONTINUED | OUTPATIENT
Start: 2022-03-24 | End: 2022-03-24 | Stop reason: HOSPADM

## 2022-03-24 RX ORDER — SODIUM CHLORIDE, SODIUM LACTATE, POTASSIUM CHLORIDE, CALCIUM CHLORIDE 600; 310; 30; 20 MG/100ML; MG/100ML; MG/100ML; MG/100ML
INJECTION, SOLUTION INTRAVENOUS CONTINUOUS
Status: DISCONTINUED | OUTPATIENT
Start: 2022-03-24 | End: 2022-03-24 | Stop reason: HOSPADM

## 2022-03-24 RX ORDER — ONDANSETRON 4 MG/1
4 TABLET, ORALLY DISINTEGRATING ORAL EVERY 30 MIN PRN
Status: DISCONTINUED | OUTPATIENT
Start: 2022-03-24 | End: 2022-03-24 | Stop reason: HOSPADM

## 2022-03-24 RX ORDER — FENTANYL CITRATE 50 UG/ML
25 INJECTION, SOLUTION INTRAMUSCULAR; INTRAVENOUS EVERY 5 MIN PRN
Status: DISCONTINUED | OUTPATIENT
Start: 2022-03-24 | End: 2022-03-24 | Stop reason: HOSPADM

## 2022-03-24 RX ORDER — PROPOFOL 10 MG/ML
INJECTION, EMULSION INTRAVENOUS PRN
Status: DISCONTINUED | OUTPATIENT
Start: 2022-03-24 | End: 2022-03-24

## 2022-03-24 RX ORDER — FENTANYL CITRATE 50 UG/ML
INJECTION, SOLUTION INTRAMUSCULAR; INTRAVENOUS PRN
Status: DISCONTINUED | OUTPATIENT
Start: 2022-03-24 | End: 2022-03-24

## 2022-03-24 RX ORDER — AMIODARONE HYDROCHLORIDE 200 MG/1
200 TABLET ORAL 2 TIMES DAILY
Qty: 90 TABLET | Refills: 1 | Status: SHIPPED | OUTPATIENT
Start: 2022-03-24 | End: 2022-04-07

## 2022-03-24 RX ORDER — FENTANYL CITRATE 50 UG/ML
50 INJECTION, SOLUTION INTRAMUSCULAR; INTRAVENOUS
Status: DISCONTINUED | OUTPATIENT
Start: 2022-03-24 | End: 2022-03-24 | Stop reason: HOSPADM

## 2022-03-24 RX ORDER — ONDANSETRON 2 MG/ML
4 INJECTION INTRAMUSCULAR; INTRAVENOUS EVERY 30 MIN PRN
Status: DISCONTINUED | OUTPATIENT
Start: 2022-03-24 | End: 2022-03-24 | Stop reason: HOSPADM

## 2022-03-24 RX ORDER — DEXAMETHASONE SODIUM PHOSPHATE 4 MG/ML
INJECTION, SOLUTION INTRA-ARTICULAR; INTRALESIONAL; INTRAMUSCULAR; INTRAVENOUS; SOFT TISSUE PRN
Status: DISCONTINUED | OUTPATIENT
Start: 2022-03-24 | End: 2022-03-24

## 2022-03-24 RX ORDER — OXYCODONE HYDROCHLORIDE 5 MG/1
5 TABLET ORAL EVERY 4 HOURS PRN
Status: CANCELLED | OUTPATIENT
Start: 2022-03-24

## 2022-03-24 RX ORDER — NEOSTIGMINE METHYLSULFATE 1 MG/ML
VIAL (ML) INJECTION PRN
Status: DISCONTINUED | OUTPATIENT
Start: 2022-03-24 | End: 2022-03-24

## 2022-03-24 RX ORDER — PROTAMINE SULFATE 10 MG/ML
INJECTION, SOLUTION INTRAVENOUS
Status: DISCONTINUED | OUTPATIENT
Start: 2022-03-24 | End: 2022-03-24 | Stop reason: HOSPADM

## 2022-03-24 RX ORDER — ONDANSETRON 2 MG/ML
INJECTION INTRAMUSCULAR; INTRAVENOUS PRN
Status: DISCONTINUED | OUTPATIENT
Start: 2022-03-24 | End: 2022-03-24

## 2022-03-24 RX ORDER — HEPARIN SODIUM 1000 [USP'U]/ML
INJECTION, SOLUTION INTRAVENOUS; SUBCUTANEOUS
Status: DISCONTINUED | OUTPATIENT
Start: 2022-03-24 | End: 2022-03-24 | Stop reason: HOSPADM

## 2022-03-24 RX ORDER — OMEPRAZOLE 20 MG/1
20 TABLET, DELAYED RELEASE ORAL DAILY
Qty: 30 TABLET | Refills: 0 | Status: SHIPPED | OUTPATIENT
Start: 2022-03-24 | End: 2022-07-19

## 2022-03-24 RX ORDER — LIDOCAINE 40 MG/G
CREAM TOPICAL
Status: DISCONTINUED | OUTPATIENT
Start: 2022-03-24 | End: 2022-03-24 | Stop reason: HOSPADM

## 2022-03-24 RX ORDER — NALOXONE HYDROCHLORIDE 0.4 MG/ML
0.2 INJECTION, SOLUTION INTRAMUSCULAR; INTRAVENOUS; SUBCUTANEOUS
Status: DISCONTINUED | OUTPATIENT
Start: 2022-03-24 | End: 2022-03-24 | Stop reason: HOSPADM

## 2022-03-24 RX ORDER — HEPARIN SODIUM 200 [USP'U]/100ML
INJECTION, SOLUTION INTRAVENOUS
Status: DISCONTINUED | OUTPATIENT
Start: 2022-03-24 | End: 2022-03-24 | Stop reason: HOSPADM

## 2022-03-24 RX ORDER — GLYCOPYRROLATE 0.2 MG/ML
INJECTION, SOLUTION INTRAMUSCULAR; INTRAVENOUS PRN
Status: DISCONTINUED | OUTPATIENT
Start: 2022-03-24 | End: 2022-03-24

## 2022-03-24 RX ADMIN — PHENYLEPHRINE HYDROCHLORIDE 100 MCG: 10 INJECTION INTRAVENOUS at 13:46

## 2022-03-24 RX ADMIN — LIDOCAINE HYDROCHLORIDE 80 MG: 20 INJECTION, SOLUTION INFILTRATION; PERINEURAL at 12:42

## 2022-03-24 RX ADMIN — FENTANYL CITRATE 50 MCG: 50 INJECTION, SOLUTION INTRAMUSCULAR; INTRAVENOUS at 14:00

## 2022-03-24 RX ADMIN — FENTANYL CITRATE 50 MCG: 50 INJECTION, SOLUTION INTRAMUSCULAR; INTRAVENOUS at 12:42

## 2022-03-24 RX ADMIN — RIVAROXABAN 20 MG: 20 TABLET, FILM COATED ORAL at 16:40

## 2022-03-24 RX ADMIN — PROPOFOL 100 MG: 10 INJECTION, EMULSION INTRAVENOUS at 12:42

## 2022-03-24 RX ADMIN — PHENYLEPHRINE HYDROCHLORIDE 100 MCG: 10 INJECTION INTRAVENOUS at 12:44

## 2022-03-24 RX ADMIN — Medication 4 MG: at 14:31

## 2022-03-24 RX ADMIN — PHENYLEPHRINE HYDROCHLORIDE 0.3 MCG/KG/MIN: 10 INJECTION INTRAVENOUS at 13:35

## 2022-03-24 RX ADMIN — DEXAMETHASONE SODIUM PHOSPHATE 4 MG: 4 INJECTION, SOLUTION INTRA-ARTICULAR; INTRALESIONAL; INTRAMUSCULAR; INTRAVENOUS; SOFT TISSUE at 12:56

## 2022-03-24 RX ADMIN — GLYCOPYRROLATE 0.6 MG: 0.2 INJECTION, SOLUTION INTRAMUSCULAR; INTRAVENOUS at 14:31

## 2022-03-24 RX ADMIN — ONDANSETRON 4 MG: 2 INJECTION INTRAMUSCULAR; INTRAVENOUS at 14:24

## 2022-03-24 RX ADMIN — PHENYLEPHRINE HYDROCHLORIDE 100 MCG: 10 INJECTION INTRAVENOUS at 12:43

## 2022-03-24 RX ADMIN — SODIUM CHLORIDE, POTASSIUM CHLORIDE, SODIUM LACTATE AND CALCIUM CHLORIDE: 600; 310; 30; 20 INJECTION, SOLUTION INTRAVENOUS at 12:38

## 2022-03-24 RX ADMIN — MIDAZOLAM 2 MG: 1 INJECTION INTRAMUSCULAR; INTRAVENOUS at 12:38

## 2022-03-24 RX ADMIN — PHENYLEPHRINE HYDROCHLORIDE 100 MCG: 10 INJECTION INTRAVENOUS at 13:39

## 2022-03-24 RX ADMIN — ROCURONIUM BROMIDE 20 MG: 50 INJECTION, SOLUTION INTRAVENOUS at 12:48

## 2022-03-24 RX ADMIN — SODIUM CHLORIDE, POTASSIUM CHLORIDE, SODIUM LACTATE AND CALCIUM CHLORIDE: 600; 310; 30; 20 INJECTION, SOLUTION INTRAVENOUS at 11:15

## 2022-03-24 RX ADMIN — SUCCINYLCHOLINE CHLORIDE 100 MG: 20 INJECTION, SOLUTION INTRAMUSCULAR; INTRAVENOUS; PARENTERAL at 12:43

## 2022-03-24 RX ADMIN — PROPOFOL 100 MG: 10 INJECTION, EMULSION INTRAVENOUS at 12:43

## 2022-03-24 ASSESSMENT — LIFESTYLE VARIABLES: TOBACCO_USE: 0

## 2022-03-24 ASSESSMENT — COPD QUESTIONNAIRES: COPD: 0

## 2022-03-24 NOTE — Clinical Note
Potential access sites were evaluated for patency using ultrasound.   The right femoral vein was selected. Access was obtained under with Sonosite and Fluoroscopic guidance using a micropuncture 21 gauge needle with direct visualization of needle entry.

## 2022-03-24 NOTE — ANESTHESIA PROCEDURE NOTES
Airway       Patient location during procedure: OR       Procedure Start/Stop Times: 3/24/2022 12:46 PM  Staff -        Anesthesiologist:  Jose Harris MD       CRNA: Usman Ragland APRN CRNA       Performed By: CRNA  Consent for Airway        Urgency: elective  Indications and Patient Condition       Indications for airway management: terence-procedural       Induction type:intravenous       Mask difficulty assessment: 1 - vent by mask    Final Airway Details       Final airway type: endotracheal airway       Successful airway: ETT - single  Endotracheal Airway Details        ETT size (mm): 7.0       Successful intubation technique: video laryngoscopy       VL Blade Size: [place] 3       Grade View of Cords: 1       Adjucts: stylet       Position: Right       Measured from: gums/teeth       Secured at (cm): 21       Bite block used: None    Post intubation assessment        Placement verified by: capnometry, equal breath sounds and chest rise        Number of attempts at approach: 1       Number of other approaches attempted: 0       Secured with: pink tape       Ease of procedure: easy       Dentition: Intact and Unchanged

## 2022-03-24 NOTE — Clinical Note
comScore Med system 12 lead EKG, hemodynamics 5 lead, pulse oximetery, NIBP, Physiocontrol hands off defibrillator/external pacer, with 3 monitoring leads to patient. Baseline assessment done.

## 2022-03-24 NOTE — DISCHARGE INSTRUCTIONS
Atrial Fib Ablation Discharge Instructions - Femoral     After you go home:      Have an adult stay with you until tomorrow.    You may resume your normal diet.    Relax and take it easy for 3 days.       For 2 days - due to the sedation you received:    DO NOT drive for 2 days    Do NOT make any important or legal decisions.    Do NOT drive or operate machines at home or at work.    Do NOT drink alcohol.    Care of Groin Puncture Site:      For the first 24 hrs - check the puncture site every 1-2 hours while awake.    For 2 days, when you cough, sneeze, laugh or move your bowels, hold your hand over the puncture site and press firmly.    Change the bandaid daily for at least 3 days. If there is minor oozing, apply another bandaid and remove it after 12 hours.    It is normal to have a small bruise or pea size lump at the site.    You may shower tomorrow.  Do NOT take a bath, or use a hot tub or pool for at least 3 days. Do NOT scrub the site. Do not use lotion or powder near the puncture site.    Activity:            For 3 days:    No stooping or squatting    Do NOT lift, push or pull more than 10 pounds     No repetitive motions such as loading , vacuuming, raking or shoveling    Bleeding:      If you start bleeding from the site in your groin, lie down flat and press firmly on the site for 10 minutes.     Once bleeding stops, lay flat for 2 hours.    Call your A Fib nurse if bleeding does not stop. After hours - you will need to go to the Emergency Department.    Call 911 if you have heavy bleeding or bleeding that does not stop.    Medicines:      Take your medications, including blood thinners, unless your provider tells you not to.    If you have stopped any medicines, check with your provider about when to restart them.    If you have pain, you may take Advil (ibuprofen) or Tylenol (acetaminophen).    Call the A Fib RN if:      Chest pain not relieved by Tylenol or Ibuprofen    Difficulty swallowing  and/or you are coughing up blood    Shortness of breath    Increased groin pain or a large or growing hard lump around the site    Groin is red, swollen, hot or tender    Blood or fluid is draining from the groin site    You have chills or a fever greater than 101 F (38 C)    Your leg feels numb, cool or changes color    Groin pain not relieved by Tylenol or Advil    Recurrent irregular or fast heart rate (A Fib) lasting over 2 hours.    Any questions or concerns.    Follow Up Appointments:      An appointment has been set up for you for follow-up care    You will receive a phone call tomorrow morning from the A fib RN.    Heart Rhythms:    You may have some irregular heartbeats. These feel very strong. They may make you feel that the A Fib is going to start again. Give it time. The irregular beats should occur less often.         [facility]:    [phone] ( 8am-5pm M-F)  Melita Gallo    [phone] option 2 (7 days a week)  on call Cardiologist

## 2022-03-24 NOTE — Clinical Note
Arrhythmia Type: atrial fibrillation.   Method of Cardioversion: synchronous.   The arrhythmia was terminated.   Energy shock delivered: 150 joules.   Time shock delivered: 13:23 CDT.   Post cardioversion rhythm: sinus rhythm.   No early return to atrial fibrillation (ERAF). No immediate return to atrial fibrillation (IRAF).

## 2022-03-24 NOTE — PROGRESS NOTES
Care Suites Post Procedure Note    Patient Information  Name: Ilana Shin  Age: 66 year old    Post Procedure  Time patient returned to Care Suites: 1615  Concerns/abnormal assessment: n/a  If abnormal assessment, provider notified: N/A  Plan/Other: Recover in care suites, then discharge home    Yulia Naranjo RN

## 2022-03-24 NOTE — PROGRESS NOTES
Uneventful isolation of all 4 PVs and LA posterior wall  Empiric ablation of CTI  Stop Diltiazem   Start Amiodarone 200 mg bid for 2 weeks then one tablet daily  Omeprazole 20 mg daily for 30 days  Xarelto 20 mg po today at 1600 with food and do not take additional dose of Xarelto tonight  Resume Xarelto 20 mg daily tomorrow night   Remove suture from access site in an hour. Home around 5 pm

## 2022-03-24 NOTE — PROGRESS NOTES
Care Suites Admission Nursing Note    Patient Information  Name: Ilana Shin  Age: 66 year old  Reason for admission: LATRELL with afib ablation  Care Suites arrival time: 1030    Visitor Information  Name: N/A  Informed of visitor restrictions: N/A  1 visitor allowed per patient   Visitor must screen negative for COVID symptoms   Visitor must wear a mask  Waiting rooms closed to visitors    Patient Admission/Assessment   Pre-procedure assessment complete: Yes  If abnormal assessment/labs, provider notified: N/A  NPO: Yes  Medications held per instructions/orders: Yes - last xarelto dose sat 3/19  Consent: deferred to MD  If applicable, pregnancy test status: declined - pt states she had her tubes tied  Patient oriented to room: Yes  Education/questions answered: Yes  Plan/other: Proceed as planned once consent is signed    Discharge Planning  Discharge name/phone number: daughter Faina [phone]  Overnight post sedation caregiver: Faina  Discharge location: home    Mita Null RN

## 2022-03-24 NOTE — Clinical Note
Arrhythmia Type: atrial fibrillation.   Method of Cardioversion: synchronous.   The arrhythmia was terminated.   Energy shock delivered: 150 joules.   Time shock delivered: 14:13 CDT.   Post cardioversion rhythm: sinus rhythm.   No early return to atrial fibrillation (ERAF). No immediate return to atrial fibrillation (IRAF).

## 2022-03-24 NOTE — INTERVAL H&P NOTE
"I have reviewed the surgical (or preoperative) H&P that is linked to this encounter, and examined the patient. There are no significant changes    Clinical Conditions Present on Arrival:  SECTIONPRESENTONADMISSIONBEGIN@                # Coagulation Defect: home medication list includes an anticoagulant medication    # Obesity: Estimated body mass index is 31.18 kg/m  as calculated from the following:    Height as of this encounter: 1.6 m (5' 3\").    Weight as of this encounter: 79.8 kg (176 lb).       "

## 2022-03-24 NOTE — PROGRESS NOTES
Care Suites Discharge Nursing Note    Patient Information  Name: Ilana Shin  Age: 66 year old    Discharge Education:  Discharge instructions reviewed: Yes  Additional education/resources provided: n/a  Patient/patient representative verbalizes understanding: Yes  Patient discharging on new medications: Yes  Medication education completed: Yes    Discharge Plans:   Discharge location: home  Discharge ride contacted: Yes  Approximate discharge time: 1740    Discharge Criteria:  Discharge criteria met and vital signs stable: Yes    Patient Belongs:  Patient belongings returned to patient: Yes    Yulia Naranjo RN

## 2022-03-24 NOTE — ANESTHESIA POSTPROCEDURE EVALUATION
Patient: Ilaan Shin    Procedure: Procedure(s):  Ablation Focal Atrial Fibrillation       Anesthesia Type:  General    Note:  Disposition: Outpatient   Postop Pain Control: Uneventful            Sign Out: Well controlled pain   PONV: No   Neuro/Psych: Uneventful            Sign Out: Acceptable/Baseline neuro status   Airway/Respiratory: Uneventful            Sign Out: Acceptable/Baseline resp. status   CV/Hemodynamics: Uneventful            Sign Out: Acceptable CV status   Other NRE: NONE   DID A NON-ROUTINE EVENT OCCUR? No           Last vitals:  Vitals Value Taken Time   /74 03/24/22 1515   Temp     Pulse 67 03/24/22 1530   Resp 16 03/24/22 1530   SpO2 93 % 03/24/22 1530   Vitals shown include unvalidated device data.    Electronically Signed By: Eladio Salazar MD  March 24, 2022  3:31 PM

## 2022-03-24 NOTE — PROGRESS NOTES
Pt with PAC's in pacu, otherwise Sinus rhythm. Dr. Hernandez notified. Not concerned, no new orders or interventions.

## 2022-03-24 NOTE — ANESTHESIA PREPROCEDURE EVALUATION
Anesthesia Pre-Procedure Evaluation    Patient: Ilana Shin   MRN: [id number] : 1955        Procedure : Procedure(s):  Ablation Focal Atrial Fibrillation          Past Medical History:   Diagnosis Date     Benign essential hypertension      Bradycardia      CKD (chronic kidney disease), stage III (H)      Fibromyalgia      Hypothyroidism      Moderate aortic stenosis      Obesity (BMI 30-39.9)      Osteopenia      Pure hypercholesterolemia      Uncomplicated asthma       Past Surgical History:   Procedure Laterality Date     ARTHROSCOPY KNEE WITH LATERAL MENISCECTOMY Right 3/8/2019    Procedure: RIGHT KNEE  ARTHROSCOPIC, CHONDROPLASTY OF THE MEDIAL CONDRA,  PARTIAL  LATERAL MENISCECTOMY;  Surgeon: Vinay Laird MD;  Location: [facility] OR     BUNIONECTOMY Right 2014     BUNIONECTOMY LADONNA, REPAIR HAMMER TOE(S), COMBINED  2003    Left foot     CHOLECYSTECTOMY, OPEN       RELEASE CARPAL TUNNEL BILATERAL       TUBAL LIGATION  1984    age 29     ZZC LAPAROSCOPIC JASS EN Y GASTROJEJUNOSTOMY  2006      Allergies   Allergen Reactions     Codeine Sulfate GI Disturbance      Social History     Tobacco Use     Smoking status: Former Smoker     Packs/day: 1.00     Years: 20.00     Pack years: 20.00     Types: Cigarettes     Quit date: 2002     Years since quittin.2     Smokeless tobacco: Never Used   Substance Use Topics     Alcohol use: No      Wt Readings from Last 1 Encounters:   22 79.8 kg (176 lb)        Anesthesia Evaluation   Pt has had prior anesthetic. Type: General.    No history of anesthetic complications       ROS/MED HX  ENT/Pulmonary:    (-) tobacco use, COPD and sleep apnea   Neurologic:  - neg neurologic ROS     Cardiovascular:     (+) Dyslipidemia hypertension-----valvular problems/murmurs type: AS  moderate. Previous cardiac testing   Echo: Date:  Results:  [id number]  [id number]  [id number]  [id number]^JOANNA^SHERRI^[facility]  Echocardiography  Laboratory  [address] [facility].  [place] [id number]     Name: ARNOLD GUIDO  MRN: [id number]  : 1955  Study Date: 2021 09:40 AM  Age: 65 yrs  Gender: Female  Patient Location: [facility]  Reason For Study: Moderate aortic stenosis  Ordering Physician: SHERRI MARSHALL  Referring Physician: Prema Rodgers  Performed By: Luba Chapa RDCS     BSA: 1.8 m2  Height: 63 in  Weight: 173 lb  BP: 147/84 mmHg  ______________________________________________________________________________  Procedure  Complete Echo Adult.  ______________________________________________________________________________  Interpretation Summary     Left ventricular systolic function is normal.The visual ejection fraction is  60-65%.  The right ventricular systolic function is normal.  The left atrium is moderately dilated.  Moderate valvular aortic stenosis, mean gradients 19 mm hg, EMILY 1.2 cm2.     Compared to echo dated 2018 no significant changes.  ______________________________________________________________________________  Left Ventricle  The left ventricle is normal in size. There is mild concentric left  ventricular hypertrophy. Left ventricular systolic function is normal. The  visual ejection fraction is 60-65%. Diastolic Doppler findings (E/E' ratio  and/or other parameters) suggest left ventricular filling pressures are  indeterminate. No regional wall motion abnormalities noted.     Right Ventricle  The right ventricle is normal size. The right ventricular systolic function is  normal.     Atria  The left atrium is moderately dilated. Right atrial size is normal. There is  no color Doppler evidence of an atrial shunt.     Mitral Valve  There is trace mitral regurgitation.     Tricuspid Valve  There is mild (1+) tricuspid regurgitation. The right ventricular systolic  pressure is approximated at 23.5 mmHg plus the right atrial pressure.     Aortic Valve  The aortic valve is not well visualized. No aortic  regurgitation is present.  Moderate valvular aortic stenosis. The mean AoV pressure gradient is 19mmHg.     Pulmonic Valve  The pulmonic valve is not well visualized. There is no pulmonic valvular  stenosis.     Vessels  The aortic root is normal size. Normal size ascending aorta. The inferior vena  cava was normal in size with preserved respiratory variability.     Pericardium  There is no pericardial effusion.     Rhythm  The rhythm was atrial fibrillation.  ______________________________________________________________________________  MMode/2D Measurements & Calculations     IVSd: 1.2 cm  LVIDd: 3.8 cm  LVIDs: 2.7 cm  LVPWd: 1.3 cm  FS: 28.0 %  LV mass(C)d: 164.7 grams  LV mass(C)dI: 90.6 grams  Stress Test: Date: Results:    ECG Reviewed: Date: Results:    Cath: Date: Results:      METS/Exercise Tolerance:     Hematologic:       Musculoskeletal:       GI/Hepatic:    (-) GERD and liver disease   Renal/Genitourinary:     (+) renal disease, type: CRI,     Endo:     (+) thyroid problem, hypothyroidism, Obesity,     Psychiatric/Substance Use:       Infectious Disease:       Malignancy:       Other:            Physical Exam    Airway        Mallampati: II   TM distance: > 3 FB   Neck ROM: full   Mouth opening: > 3 cm    Respiratory Devices and Support         Dental       (+) chipped    B=Bridge, C=Chipped, L=Loose, M=Missing    Cardiovascular          Rhythm and rate: irregular and normal   (+) murmur       Pulmonary   pulmonary exam normal                OUTSIDE LABS:  CBC:   Lab Results   Component Value Date    WBC 7.7 03/24/2022    WBC 7.3 08/02/2021    HGB 13.4 03/24/2022    HGB 13.3 08/02/2021    HCT 40.4 03/24/2022    HCT 41.6 08/02/2021     03/24/2022     08/02/2021     BMP:   Lab Results   Component Value Date     03/24/2022     08/17/2021    POTASSIUM 3.8 03/24/2022    POTASSIUM 4.7 08/17/2021    CHLORIDE 110 (H) 03/24/2022    CHLORIDE 111 (H) 08/17/2021    CO2 23 03/24/2022     CO2 28 08/17/2021    BUN 17 03/24/2022    BUN 19 08/17/2021    CR 1.08 (H) 03/24/2022    CR 1.08 (H) 08/17/2021     (H) 03/24/2022    GLC 95 08/17/2021     COAGS:   Lab Results   Component Value Date    PTT 24 08/15/2006    INR 0.89 08/27/2013     POC: No results found for: BGM, HCG, HCGS  HEPATIC:   Lab Results   Component Value Date    ALBUMIN 3.6 08/17/2021    PROTTOTAL 7.3 08/17/2021    ALT 66 (H) 08/17/2021    AST 88 (H) 08/17/2021    ALKPHOS 102 08/17/2021    BILITOTAL 0.8 08/17/2021     OTHER:   Lab Results   Component Value Date    LACT 1.0 03/24/2022    A1C 5.8 07/21/2015    TIFFANY 9.5 03/24/2022    PHOS 3.7 02/27/2018    MAG 2.1 02/27/2018    TSH 2.03 08/17/2021    T4 1.16 03/06/2019    CRP 1.9 07/15/2011    SED 24.0 (H) 07/15/2011       Anesthesia Plan    ASA Status:  3   NPO Status:  NPO Appropriate    Anesthesia Type: General.     - Airway: ETT   Induction: Intravenous.   Maintenance: Inhalation.        Consents    Anesthesia Plan(s) and associated risks, benefits, and realistic alternatives discussed. Questions answered and patient/representative(s) expressed understanding.    - Discussed:     - Discussed with:  Patient      - Extended Intubation/Ventilatory Support Discussed: No.      - Patient is DNR/DNI Status: No    Use of blood products discussed: Yes.     - Discussed with: Patient.     - Consented: consented to blood products            Reason for refusal: other.     Postoperative Care    Pain management: IV analgesics.   PONV prophylaxis: Ondansetron (or other 5HT-3), Dexamethasone or Solumedrol     Comments:                Eladio Salazar MD

## 2022-03-25 ENCOUNTER — TELEPHONE (OUTPATIENT)
Dept: CARDIOLOGY | Facility: CLINIC | Age: 67
End: 2022-03-25
Payer: COMMERCIAL

## 2022-03-25 NOTE — TELEPHONE ENCOUNTER
Message left for patient in follow up to afib ablation completed 3/24.  Awaiting return call.  SALVADOR Billings

## 2022-03-25 NOTE — TELEPHONE ENCOUNTER
Patient return call reports feeling tired today.  Reports chest tightness and tenderness in right groin.  Has taken ibuprofen for discomfort which has been effective.  She notes small patch by insertion site right groin feeling numb.  Denies any lump in area.  Notes coughing more today.  Encourage patient to move about and take deep breaths as she may be experience some atelectasis from being sedentary for the past 36 hours.  Patient denies fever.  Groins site did have small amount of oozing after dressing removed however no further drainage.  Patient has bandaid to area.  Reviewed medication changes per Dr Hernandez:  Stop Diltiazem   Start Amiodarone 200 mg bid for 2 weeks then one tablet daily  Omeprazole 20 mg daily for 30 days  Xarelto 20 mg po today at 1600 with food and do not take additional dose of Xarelto tonight  Resume Xarelto 20 mg daily tomorrow night   Medication list updated with changes.  Reviewed discharge instruction and follow up appt with patient.  Patient provided verbal understanding.  Will check in with patient on Monday to see how her coughing is and to see if  tenderness in groin has improved.  SALVADOR Billings

## 2022-03-28 NOTE — TELEPHONE ENCOUNTER
Message left for patient in follow up to see how her weekend went after her afib ablation on 3/24.  Awaiting return call from patient.  SALVADOR Billings

## 2022-03-29 LAB
ATRIAL RATE - MUSE: 416 BPM
DIASTOLIC BLOOD PRESSURE - MUSE: NORMAL MMHG
INTERPRETATION ECG - MUSE: NORMAL
P AXIS - MUSE: NORMAL DEGREES
PR INTERVAL - MUSE: NORMAL MS
QRS DURATION - MUSE: 80 MS
QT - MUSE: 310 MS
QTC - MUSE: 428 MS
R AXIS - MUSE: 71 DEGREES
SYSTOLIC BLOOD PRESSURE - MUSE: NORMAL MMHG
T AXIS - MUSE: -79 DEGREES
VENTRICULAR RATE- MUSE: 115 BPM

## 2022-03-30 NOTE — PROGRESS NOTES
"[facility] CLINIC    I had the pleasure of seeing Ilana when she came for follow up of  AFib ablation.  This 66 year old sees Dr. Hernandez and Dr. Jordan for her history of:    1.  Persistent AFib. Normal EF - dx'd 8/2021. Now s/p AFib ablation 3/24/2022 with DCCV/PVI x4/LA posterior wall roof and floor/DCCV/empiric CTI  2. HTN  3. Moderate aortic stenosis -noted on echo 10/2021 with EMILY 1.2 cm  and mean gradient 19 mmHg      Leta saw her 1/2022 at which time she c/o increasing fatigue.  ZioPatch showed persistent AFib with avg HR 93 bpm on diltiazem.  She saw Dr. Hernandez 3/2022 at which time the reviewed options.    She underwent AFib ablation with DCCV, PVI x 4, LA posterior wall ablation (Floor, Roof), repeat DCCV and empiric ablation of CTI on 3/24/2022.  She was discharged home the same day.  Diltiazem was stopped.  Amiodarone 200 mg BID x 2 weeks, then 1 tablet daily recommended.  Given posterior wall ablation, started on omeprazole x 30 days.  Xarelto 20 mg daily continued.    The following day, she was called per protocol and noted to feel fatigue, right groin tenderness and chest tightness.  Ibuprofen was improving her discomfort.  Nursing called her again 3/28 to check in, but patient had not yet returned phone call.    Interval History:  Ilana thinks that she's had episodes of AFib since the ablation, noted on her BP monitor cuff, as well as some episodes of stronger palpitations. No dizziness, lightheadedness.  She notes she \"expected this\" after the procedure and still feels like he has been in normal rhythm more often than not.    Ilana thinks the groin site is still a bit tender and she has some \"numbness\" around the area. This hasn't changed since the ablation, and she confirms that it is not improving. No difficulty walking.  She states it does not bother her, just \"feels weird.\"    No fever/chills.  Denies trouble swallowing, but feels she \"feels food going down in every swallow\" whenever she " "eats.  Nothing is getting stuck. No vomiting/nausea.    VITALS:  Vitals: /68   Pulse 59   Ht 1.6 m (5' 3\")   Wt 80.3 kg (177 lb)   LMP  (LMP Unknown)   BMI 31.35 kg/m      Diagnostic Testing:  EKG today, which I overread, showed NSR 69 bpm  LATRELL 3/24/2022 -EF 60%.  Normal RV.  Trace MR.  2+ TR.  ZioPatch 1/2022 - AFib with average heart rate 93 bpm (53--220 bpm)    Plan:  1. Move omeprazole to AM  2. See Dr. Hernandez as planned    Assessment/Plan:    1. Persistent AFib. Nl EF    S/p extensive ablation 3/24/2022, with DCCV x2, PVI x4, roof/floor lines and empiric CTI ablation    Started on amiodarone 200 mg BID x 2 weeks (started 3/25), to be followed by 200 mg daily starting ~4/8    Diltiazem 120 mg daily stopped at time of ablation    EKG today, as above showed SR    Remains on Xarelto 20 mg daily    PLAN:    Start checking BP/HR daily -reviewed that we would want her to contact us if her BP monitor indicated irregular heart rate for more than half a day, so we can get an EKG and discuss changes to medication versus cardioversion.  She understands that her heart was very used to being in atrial fibrillation, and will continue to monitor for persistent recurrence closely    We will continue amiodarone (due to decreased dose around 4/8)    Continue Xarelto    I would have expected her right groin site to be less numb as she gets further and further out from the procedure.  There is a least a good 8-9 cm in all directions from her puncture site where she did not feel me touching with my fingernail.  No color changes.  Confirms no pain    She did have a small, 3 x 3 cm hematoma noted at the right groin site without bruit.  I think that her numbness is likely due to peripheral nerve compression from the hematoma and will improve, but again, I am surprised it has not started improving yet.  Will discuss with Dr. Hernandez    Given the sensation of \"feeling every swallow\" I will have her start taking her omeprazole first " "thing in the morning and will let Dr. Hernandez know.  Confirms nothing is \"getting stuck.\"  No vomiting, fever, chills, neurologic changes    2. HTN    On losartan 100 mg daily    Since stopping diltiazem 120 mg, BP remains good (high here initially as she had to drive in the sleet this morning from [place]    PLAN:    Continue to monitor      3. On amiodarone therapy    Expectation is that she will be on this for ~3m post AFib ablation done 3/24    TSH 8/2021 wnl on levothyroxine    CMP 8/2021 with elevated AST, ALT (88, 66, respectively) which Dr. Rodgers felt d/t fatty infiltration of the liver    PLAN:     If amiodarone used >3 m would get additional testing          Ngoc Weir PA-C, MSPAS      Orders Placed This Encounter   Procedures     EKG 12-lead complete w/read - Clinics (performed today)     No orders of the defined types were placed in this encounter.    There are no discontinued medications.      Encounter Diagnosis   Name Primary?     Paroxysmal atrial fibrillation (H) Yes       CURRENT MEDICATIONS:  Current Outpatient Medications   Medication Sig Dispense Refill     acetaminophen (TYLENOL) 325 MG tablet Take 2 tablets (650 mg) by mouth every 4 hours as needed for mild pain 50 tablet 0     amiodarone (PACERONE) 200 MG tablet Take 1 tablet (200 mg) by mouth 2 times daily For 2 weeks then one tablet (200 mg) daily 90 tablet 1     amitriptyline (ELAVIL) 50 MG tablet Take 1 tablet (50 mg) by mouth At Bedtime (Patient taking differently: Take 25 mg by mouth At Bedtime ) 90 tablet 0     CALCIUM PO        Ferrous Gluconate 240 (27 Fe) MG TABS Take by mouth every other day        fluticasone (FLONASE) 50 MCG/ACT nasal spray Spray 2 sprays into both nostrils daily (Patient taking differently: Spray 2 sprays into both nostrils daily as needed ) 16 g 0     fluticasone-salmeterol (ADVAIR) 100-50 MCG/DOSE inhaler Inhale 1 puff into the lungs every 12 hours (Patient taking differently: Inhale 1 puff into the lungs 2 times " "daily as needed ) 3 each 1     levothyroxine (SYNTHROID/LEVOTHROID) 100 MCG tablet TAKE 1 TABLET DAILY (MEDICATION IS BEING FILLED FOR 1 TIME ONLY. NEED TO BE SEEN. IT HAS BEEN MORE THAN 1 YEAR SINCE LAST VISIT) 90 tablet 2     losartan (COZAAR) 100 MG tablet TAKE 1 TABLET DAILY (MEDICATION IS BEING FILLED FOR 1 TIME ONLY. NEED TO BE SEEN. IT HAS BEEN MORE THAN 1 YEAR SINCE LAST VISIT) 90 tablet 3     omeprazole (PRILOSEC OTC) 20 MG EC tablet Take 1 tablet (20 mg) by mouth daily 30 tablet 0     rivaroxaban ANTICOAGULANT (XARELTO ANTICOAGULANT) 20 MG TABS tablet Take 1 tablet (20 mg) by mouth daily (with dinner) 1 tablet 0       ALLERGIES     Allergies   Allergen Reactions     Codeine Sulfate GI Disturbance         Review of Systems:  Skin:  Negative     Eyes:  Negative    ENT:  Negative    Respiratory:  Positive for dyspnea on exertion  Cardiovascular:  Negative for;chest pain;edema;syncope or near-syncope;cyanosis;exercise intolerance;fatigue;dizziness;lightheadedness Positive for;palpitations;exercise intolerance  Gastroenterology: Positive for    Genitourinary:  Positive for    Musculoskeletal:  Positive for back pain;neck pain;joint pain;fibromyalgia  Neurologic:  Positive for numbness or tingling of feet  Psychiatric:  Negative    Heme/Lymph/Imm:  Positive for night sweats  Endocrine:  Positive for thyroid disorder    Physical Exam:  Vitals: /68   Pulse 59   Ht 1.6 m (5' 3\")   Wt 80.3 kg (177 lb)   LMP  (LMP Unknown)   BMI 31.35 kg/m      Constitutional:  cooperative, alert and oriented, well developed, well nourished, in no acute distress        Skin:  warm and dry to the touch, no apparent skin lesions or masses noted        Head:  normocephalic, no masses or lesions        Eyes:  pupils equal and round;conjunctivae and lids unremarkable;sclera white;no xanthalasma        ENT:  not assessed this visit        Neck:  no carotid bruit;JVP normal        Chest:  normal breath sounds, clear to " "auscultation, normal A-P diameter, normal symmetry, normal respiratory excursion, no use of accessory muscles        Cardiac: regular rhythm       systolic ejection murmur late systolic murmur        Abdomen:  abdomen soft        Vascular: pulses full and equal                               right femoral bruit (-)      Extremities and Back:  no deformities, clubbing, cyanosis, erythema observed;no edema        Neurological:  no gross motor deficits   \"Surface\" numbness surrouding R groin access site        PAST MEDICAL HISTORY:  Past Medical History:   Diagnosis Date     Benign essential hypertension      Bradycardia      CKD (chronic kidney disease), stage III (H)      Fibromyalgia      Hypothyroidism      Moderate aortic stenosis      Obesity (BMI 30-39.9)      Osteopenia      Pure hypercholesterolemia      Uncomplicated asthma        PAST SURGICAL HISTORY:  Past Surgical History:   Procedure Laterality Date     ARTHROSCOPY KNEE WITH LATERAL MENISCECTOMY Right 3/8/2019    Procedure: RIGHT KNEE  ARTHROSCOPIC, CHONDROPLASTY OF THE MEDIAL CONDRA,  PARTIAL  LATERAL MENISCECTOMY;  Surgeon: Vinay Laird MD;  Location:  OR     BUNIONECTOMY Right 8/2014     BUNIONECTOMY LADONNA, REPAIR HAMMER TOE(S), COMBINED  09/2003    Left foot     CHOLECYSTECTOMY, OPEN  1994     EP ABLATION FOCAL AFIB N/A 3/24/2022    Procedure: Ablation Focal Atrial Fibrillation;  Surgeon: Sadi Hernandez MD;  Location:  HEART CARDIAC CATH LAB     RELEASE CARPAL TUNNEL BILATERAL  1999     TUBAL LIGATION  1984    age 29     ZZC LAPAROSCOPIC JASS EN Y GASTROJEJUNOSTOMY  2006       FAMILY HISTORY:  Family History   Problem Relation Age of Onset     Hypertension Mother      Deep Vein Thrombosis (DVT) Mother         x2 - both provoked (trauma, surgery)     Diabetes Type 2  Father      Hypertension Father      Breast Cancer Paternal Grandmother      Breast Cancer Sister 45     Myocardial Infarction Sister 50     Deep Vein Thrombosis (DVT) Sister  "        provoked (surgery)     Lung Cancer Brother         smoker     Hypertension Sister         x2     Cerebrovascular Disease No family hx of      Colon Cancer No family hx of      Ovarian Cancer No family hx of        SOCIAL HISTORY:  Social History     Socioeconomic History     Marital status:      Spouse name: None     Number of children: None     Years of education: None     Highest education level: None   Occupational History     Occupation: Retired - instrument tech   Tobacco Use     Smoking status: Former Smoker     Packs/day: 1.00     Years: 20.00     Pack years: 20.00     Types: Cigarettes     Quit date: 2002     Years since quittin.2     Smokeless tobacco: Never Used   Substance and Sexual Activity     Alcohol use: No     Drug use: No     Sexual activity: Not Currently   Other Topics Concern     Parent/sibling w/ CABG, MI or angioplasty before 65F 55M? No      Service Not Asked     Blood Transfusions Not Asked     Caffeine Concern Not Asked     Occupational Exposure Not Asked     Hobby Hazards Not Asked     Sleep Concern Not Asked     Stress Concern Not Asked     Weight Concern Not Asked     Special Diet Not Asked     Back Care Not Asked     Exercise Not Asked     Bike Helmet Yes     Seat Belt Yes     Self-Exams Not Asked   Social History Narrative    .    Lives with  and two grandchildren.     2 adult kids.    5 grand kids.    Walks regularly.      Social Determinants of Health     Financial Resource Strain: Not on file   Food Insecurity: Not on file   Transportation Needs: Not on file   Physical Activity: Not on file   Stress: Not on file   Social Connections: Not on file   Intimate Partner Violence: Not on file   Housing Stability: Not on file

## 2022-03-31 ENCOUNTER — DOCUMENTATION ONLY (OUTPATIENT)
Dept: CARDIOLOGY | Facility: CLINIC | Age: 67
End: 2022-03-31

## 2022-03-31 ENCOUNTER — OFFICE VISIT (OUTPATIENT)
Dept: CARDIOLOGY | Facility: CLINIC | Age: 67
End: 2022-03-31
Attending: INTERNAL MEDICINE
Payer: COMMERCIAL

## 2022-03-31 VITALS
DIASTOLIC BLOOD PRESSURE: 68 MMHG | HEART RATE: 59 BPM | WEIGHT: 177 LBS | SYSTOLIC BLOOD PRESSURE: 124 MMHG | BODY MASS INDEX: 31.36 KG/M2 | HEIGHT: 63 IN

## 2022-03-31 DIAGNOSIS — I48.0 PAROXYSMAL ATRIAL FIBRILLATION (H): Primary | ICD-10-CM

## 2022-03-31 PROCEDURE — 93000 ELECTROCARDIOGRAM COMPLETE: CPT | Performed by: PHYSICIAN ASSISTANT

## 2022-03-31 PROCEDURE — 99214 OFFICE O/P EST MOD 30 MIN: CPT | Performed by: PHYSICIAN ASSISTANT

## 2022-03-31 NOTE — PROGRESS NOTES
Spoke to patient to review with her that NGA Cochran reviewed with Dr Hernandez regarding the swallowing and numb groin issues.  He wants patient to monitor for now and to call if worse or not improved in 3 weeks.  Patient provided verbal understanding.  SALVADOR Billings

## 2022-03-31 NOTE — LETTER
"3/31/2022    Prema Rodgers MD  [address] [facility] [address]    RE: Ilana Shin       Dear Colleague,     I had the pleasure of seeing Ilana Shin in the [facility].  [facility] HEART [facility]    I had the pleasure of seeing Ilana when she came for follow up of  AFib ablation.  This 66 year old sees Dr. Hernandez and Dr. Jordan for her history of:    1.  Persistent AFib. Normal EF - dx'd 8/2021. Now s/p AFib ablation 3/24/2022 with DCCV/PVI x4/LA posterior wall roof and floor/DCCV/empiric CTI  2. HTN  3. Moderate aortic stenosis -noted on echo 10/2021 with EMILY 1.2 cm  and mean gradient 19 mmHg      Leta saw her 1/2022 at which time she c/o increasing fatigue.  ZioPatch showed persistent AFib with avg HR 93 bpm on diltiazem.  She saw Dr. Hernandez 3/2022 at which time the reviewed options.    She underwent AFib ablation with DCCV, PVI x 4, LA posterior wall ablation (Floor, Roof), repeat DCCV and empiric ablation of CTI on 3/24/2022.  She was discharged home the same day.  Diltiazem was stopped.  Amiodarone 200 mg BID x 2 weeks, then 1 tablet daily recommended.  Given posterior wall ablation, started on omeprazole x 30 days.  Xarelto 20 mg daily continued.    The following day, she was called per protocol and noted to feel fatigue, right groin tenderness and chest tightness.  Ibuprofen was improving her discomfort.  Nursing called her again 3/28 to check in, but patient had not yet returned phone call.    Interval History:  Ilana thinks that she's had episodes of AFib since the ablation, noted on her BP monitor cuff, as well as some episodes of stronger palpitations. No dizziness, lightheadedness.  She notes she \"expected this\" after the procedure and still feels like he has been in normal rhythm more often than not.    Ilana thinks the groin site is still a bit tender and she has some \"numbness\" around the area. This hasn't changed since the ablation, and she confirms that it is not " "improving. No difficulty walking.  She states it does not bother her, just \"feels weird.\"    No fever/chills.  Denies trouble swallowing, but feels she \"feels food going down in every swallow\" whenever she eats.  Nothing is getting stuck. No vomiting/nausea.    VITALS:  Vitals: /68   Pulse 59   Ht 1.6 m (5' 3\")   Wt 80.3 kg (177 lb)   LMP  (LMP Unknown)   BMI 31.35 kg/m      Diagnostic Testing:  EKG today, which I overread, showed NSR 69 bpm  LATRELL 3/24/2022 -EF 60%.  Normal RV.  Trace MR.  2+ TR.  ZioPatch 1/2022 - AFib with average heart rate 93 bpm (53--220 bpm)    Plan:  1. Move omeprazole to AM  2. See Dr. Hernandez as planned    Assessment/Plan:    1. Persistent AFib. Nl EF    S/p extensive ablation 3/24/2022, with DCCV x2, PVI x4, roof/floor lines and empiric CTI ablation    Started on amiodarone 200 mg BID x 2 weeks (started 3/25), to be followed by 200 mg daily starting ~4/8    Diltiazem 120 mg daily stopped at time of ablation    EKG today, as above showed SR    Remains on Xarelto 20 mg daily    PLAN:    Start checking BP/HR daily -reviewed that we would want her to contact us if her BP monitor indicated irregular heart rate for more than half a day, so we can get an EKG and discuss changes to medication versus cardioversion.  She understands that her heart was very used to being in atrial fibrillation, and will continue to monitor for persistent recurrence closely    We will continue amiodarone (due to decreased dose around 4/8)    Continue Xarelto    I would have expected her right groin site to be less numb as she gets further and further out from the procedure.  There is a least a good 8-9 cm in all directions from her puncture site where she did not feel me touching with my fingernail.  No color changes.  Confirms no pain    She did have a small, 3 x 3 cm hematoma noted at the right groin site without bruit.  I think that her numbness is likely due to peripheral nerve compression from the hematoma " "and will improve, but again, I am surprised it has not started improving yet.  Will discuss with Dr. Hernandez    Given the sensation of \"feeling every swallow\" I will have her start taking her omeprazole first thing in the morning and will let Dr. Hernandez know.  Confirms nothing is \"getting stuck.\"  No vomiting, fever, chills, neurologic changes    2. HTN    On losartan 100 mg daily    Since stopping diltiazem 120 mg, BP remains good (high here initially as she had to drive in the sleet this morning from [place]    PLAN:    Continue to monitor      3. On amiodarone therapy    Expectation is that she will be on this for ~3m post AFib ablation done 3/24    TSH 8/2021 wnl on levothyroxine    CMP 8/2021 with elevated AST, ALT (88, 66, respectively) which Dr. Rodgers felt d/t fatty infiltration of the liver    PLAN:     If amiodarone used >3 m would get additional testing          Ngoc Weir PA-C, MSPAS      Orders Placed This Encounter   Procedures     EKG 12-lead complete w/read - Clinics (performed today)     No orders of the defined types were placed in this encounter.    There are no discontinued medications.      Encounter Diagnosis   Name Primary?     Paroxysmal atrial fibrillation (H) Yes       CURRENT MEDICATIONS:  Current Outpatient Medications   Medication Sig Dispense Refill     acetaminophen (TYLENOL) 325 MG tablet Take 2 tablets (650 mg) by mouth every 4 hours as needed for mild pain 50 tablet 0     amiodarone (PACERONE) 200 MG tablet Take 1 tablet (200 mg) by mouth 2 times daily For 2 weeks then one tablet (200 mg) daily 90 tablet 1     amitriptyline (ELAVIL) 50 MG tablet Take 1 tablet (50 mg) by mouth At Bedtime (Patient taking differently: Take 25 mg by mouth At Bedtime ) 90 tablet 0     CALCIUM PO        Ferrous Gluconate 240 (27 Fe) MG TABS Take by mouth every other day        fluticasone (FLONASE) 50 MCG/ACT nasal spray Spray 2 sprays into both nostrils daily (Patient taking differently: Spray 2 sprays into " "both nostrils daily as needed ) 16 g 0     fluticasone-salmeterol (ADVAIR) 100-50 MCG/DOSE inhaler Inhale 1 puff into the lungs every 12 hours (Patient taking differently: Inhale 1 puff into the lungs 2 times daily as needed ) 3 each 1     levothyroxine (SYNTHROID/LEVOTHROID) 100 MCG tablet TAKE 1 TABLET DAILY (MEDICATION IS BEING FILLED FOR 1 TIME ONLY. NEED TO BE SEEN. IT HAS BEEN MORE THAN 1 YEAR SINCE LAST VISIT) 90 tablet 2     losartan (COZAAR) 100 MG tablet TAKE 1 TABLET DAILY (MEDICATION IS BEING FILLED FOR 1 TIME ONLY. NEED TO BE SEEN. IT HAS BEEN MORE THAN 1 YEAR SINCE LAST VISIT) 90 tablet 3     omeprazole (PRILOSEC OTC) 20 MG EC tablet Take 1 tablet (20 mg) by mouth daily 30 tablet 0     rivaroxaban ANTICOAGULANT (XARELTO ANTICOAGULANT) 20 MG TABS tablet Take 1 tablet (20 mg) by mouth daily (with dinner) 1 tablet 0       ALLERGIES     Allergies   Allergen Reactions     Codeine Sulfate GI Disturbance         Review of Systems:  Skin:  Negative     Eyes:  Negative    ENT:  Negative    Respiratory:  Positive for dyspnea on exertion  Cardiovascular:  Negative for;chest pain;edema;syncope or near-syncope;cyanosis;exercise intolerance;fatigue;dizziness;lightheadedness Positive for;palpitations;exercise intolerance  Gastroenterology: Positive for    Genitourinary:  Positive for    Musculoskeletal:  Positive for back pain;neck pain;joint pain;fibromyalgia  Neurologic:  Positive for numbness or tingling of feet  Psychiatric:  Negative    Heme/Lymph/Imm:  Positive for night sweats  Endocrine:  Positive for thyroid disorder    Physical Exam:  Vitals: /68   Pulse 59   Ht 1.6 m (5' 3\")   Wt 80.3 kg (177 lb)   LMP  (LMP Unknown)   BMI 31.35 kg/m      Constitutional:  cooperative, alert and oriented, well developed, well nourished, in no acute distress        Skin:  warm and dry to the touch, no apparent skin lesions or masses noted        Head:  normocephalic, no masses or lesions        Eyes:  pupils equal " "and round;conjunctivae and lids unremarkable;sclera white;no xanthalasma        ENT:  not assessed this visit        Neck:  no carotid bruit;JVP normal        Chest:  normal breath sounds, clear to auscultation, normal A-P diameter, normal symmetry, normal respiratory excursion, no use of accessory muscles        Cardiac: regular rhythm       systolic ejection murmur late systolic murmur        Abdomen:  abdomen soft        Vascular: pulses full and equal                               right femoral bruit (-)      Extremities and Back:  no deformities, clubbing, cyanosis, erythema observed;no edema        Neurological:  no gross motor deficits   \"Surface\" numbness surrouding R groin access site        PAST MEDICAL HISTORY:  Past Medical History:   Diagnosis Date     Benign essential hypertension      Bradycardia      CKD (chronic kidney disease), stage III (H)      Fibromyalgia      Hypothyroidism      Moderate aortic stenosis      Obesity (BMI 30-39.9)      Osteopenia      Pure hypercholesterolemia      Uncomplicated asthma        PAST SURGICAL HISTORY:  Past Surgical History:   Procedure Laterality Date     ARTHROSCOPY KNEE WITH LATERAL MENISCECTOMY Right 3/8/2019    Procedure: RIGHT KNEE  ARTHROSCOPIC, CHONDROPLASTY OF THE MEDIAL CONDRA,  PARTIAL  LATERAL MENISCECTOMY;  Surgeon: Vinay Laird MD;  Location:  OR     BUNIONECTOMY Right 8/2014     BUNIONECTOMY LADONNA, REPAIR HAMMER TOE(S), COMBINED  09/2003    Left foot     CHOLECYSTECTOMY, OPEN  1994     EP ABLATION FOCAL AFIB N/A 3/24/2022    Procedure: Ablation Focal Atrial Fibrillation;  Surgeon: Sadi Hernandez MD;  Location:  HEART CARDIAC CATH LAB     RELEASE CARPAL TUNNEL BILATERAL  1999     TUBAL LIGATION  1984    age 29     ZZC LAPAROSCOPIC JASS EN Y GASTROJEJUNOSTOMY  2006       FAMILY HISTORY:  Family History   Problem Relation Age of Onset     Hypertension Mother      Deep Vein Thrombosis (DVT) Mother         x2 - both provoked (trauma, surgery) "     Diabetes Type 2  Father      Hypertension Father      Breast Cancer Paternal Grandmother      Breast Cancer Sister 45     Myocardial Infarction Sister 50     Deep Vein Thrombosis (DVT) Sister         provoked (surgery)     Lung Cancer Brother         smoker     Hypertension Sister         x2     Cerebrovascular Disease No family hx of      Colon Cancer No family hx of      Ovarian Cancer No family hx of        SOCIAL HISTORY:  Social History     Socioeconomic History     Marital status:      Spouse name: None     Number of children: None     Years of education: None     Highest education level: None   Occupational History     Occupation: Retired - instrument tech   Tobacco Use     Smoking status: Former Smoker     Packs/day: 1.00     Years: 20.00     Pack years: 20.00     Types: Cigarettes     Quit date: 2002     Years since quittin.2     Smokeless tobacco: Never Used   Substance and Sexual Activity     Alcohol use: No     Drug use: No     Sexual activity: Not Currently   Other Topics Concern     Parent/sibling w/ CABG, MI or angioplasty before 65F 55M? No      Service Not Asked     Blood Transfusions Not Asked     Caffeine Concern Not Asked     Occupational Exposure Not Asked     Hobby Hazards Not Asked     Sleep Concern Not Asked     Stress Concern Not Asked     Weight Concern Not Asked     Special Diet Not Asked     Back Care Not Asked     Exercise Not Asked     Bike Helmet Yes     Seat Belt Yes     Self-Exams Not Asked   Social History Narrative    .    Lives with  and two grandchildren.     2 adult kids.    5 grand kids.    Walks regularly.      Social Determinants of Health     Financial Resource Strain: Not on file   Food Insecurity: Not on file   Transportation Needs: Not on file   Physical Activity: Not on file   Stress: Not on file   Social Connections: Not on file   Intimate Partner Violence: Not on file   Housing Stability: Not on file           Thank you for  allowing me to participate in the care of your patient.      Sincerely,     Anika Weir PA-C     [facility]  cc:   Sadi Fournier MD  [address]  [place] [id number]

## 2022-03-31 NOTE — PATIENT INSTRUCTIONS
"Ilana - it was nice to meet to you today.    1. The EKG today looks great  2. Reviewed that you've had some numbness around leg site as well as \"feeling every swallow.\"  3. Reviewed probably some AFib based on the palpitations and the BP cuff readings    PLAN:  1. Move the omeprazole to first thing in AM    2. Check HR/rhythm every day and CALL if you're in AFib persistently > 12 hours. If AFib continues (or you're not sure), CALL and we'll try to get EKG up at [facility] and figure out the next plan    3. Continue Xarelto.   4. Decrease amiodarone from twice daily to just daily after 2 weeks (~4/8/2022)    Our Arrhythmia Nurses Melita Gallo Kathy: [phone]  "

## 2022-03-31 NOTE — PROGRESS NOTES
Pls let Ilana know that Dr. Hernandez is aware of the swallowing and numb groin issues.      Monitor for now.   Call if worse or not improved in 3 weeks.    Sadi King MD  You 2 minutes ago (1:53 PM)     QP       Monitor for now. qp         Documentation

## 2022-03-31 NOTE — PROGRESS NOTES
"Dr. Hernandez - update and ?any testing?    Ilana is doing well post complex AFib ablation done 3/24. Interestingly, has:    1. She has \"skin numbness: ~8 cm out in all directions from her R groin access site. No pain but could not feel my fingernail in all directions. 3x3 hematoma noted but no bruit, tenderness.     I think this is probably just from the pressure on the nerves from the hematoma & procedure, but wanted to let you know as she didn't think it was getting any better since the ablation 1 week ago    2. No trouble swallowing but notes esophagus very sensitive  - she can feel \"every swallow the whole way down.\" Nothing getting stuck, no vomiting, bleeding. No fever/chills, neuro issues.   Again, wanted to let you know as it's not betting better despite futher out from ablation.    She's going to move her PPI to first thing in AM and continue to monitor. Let me know if you think any other things need to be done.    Thanks -   Ngoc  "

## 2022-04-04 ENCOUNTER — TELEPHONE (OUTPATIENT)
Dept: CARDIOLOGY | Facility: CLINIC | Age: 67
End: 2022-04-04
Payer: COMMERCIAL

## 2022-04-04 DIAGNOSIS — I48.0 PAROXYSMAL ATRIAL FIBRILLATION (H): Primary | ICD-10-CM

## 2022-04-04 NOTE — TELEPHONE ENCOUNTER
4/4/22 Pt called to report she feels like she has been in Afib since Thursday 3/31 during the night. He feels her heart beating irregularly and she does note episodes of SOB and lightheadedness.  Pt is s/p Afib Ablation on 3/24. She verifies  she is taking  Amiodarone 200 mg BID  Losartan 100 mg daily  Xarelto 20 mg daily  Bps have been   104/75  107/87  103/81  Pt lives approx 1 hour away from [place]. Scheduled her for ambulatory EKG tomorrow at 9 am. Pt is n agreement   Will update Ngoc Weir PA-C and watch for ambulatory EKG tomorrow.  KHerroRN 1125 am

## 2022-04-04 NOTE — TELEPHONE ENCOUNTER
Thanks for arranging EKG. Will see what it shows tomorrow - if has been in it since Thursday will likely need DCCV.    Continue all meds for now as HR is 81-97 bpm    Ngoc

## 2022-04-05 ENCOUNTER — HOSPITAL ENCOUNTER (OUTPATIENT)
Dept: CARDIOLOGY | Facility: CLINIC | Age: 67
Discharge: HOME | End: 2022-04-05
Attending: PHYSICIAN ASSISTANT | Admitting: PHYSICIAN ASSISTANT
Payer: COMMERCIAL

## 2022-04-05 ENCOUNTER — VIRTUAL VISIT (OUTPATIENT)
Dept: CARDIOLOGY | Facility: CLINIC | Age: 67
End: 2022-04-05
Payer: COMMERCIAL

## 2022-04-05 ENCOUNTER — TELEPHONE (OUTPATIENT)
Dept: CARDIOLOGY | Facility: CLINIC | Age: 67
End: 2022-04-05

## 2022-04-05 DIAGNOSIS — I48.0 PAROXYSMAL ATRIAL FIBRILLATION (H): Primary | ICD-10-CM

## 2022-04-05 DIAGNOSIS — I48.19 PERSISTENT ATRIAL FIBRILLATION (H): ICD-10-CM

## 2022-04-05 DIAGNOSIS — I48.0 PAROXYSMAL ATRIAL FIBRILLATION (H): ICD-10-CM

## 2022-04-05 PROCEDURE — 93005 ELECTROCARDIOGRAM TRACING: CPT

## 2022-04-05 PROCEDURE — 99214 OFFICE O/P EST MOD 30 MIN: CPT | Mod: 95 | Performed by: PHYSICIAN ASSISTANT

## 2022-04-05 PROCEDURE — 93010 ELECTROCARDIOGRAM REPORT: CPT | Performed by: PHYSICIAN ASSISTANT

## 2022-04-05 NOTE — TELEPHONE ENCOUNTER
4/5/22 Msg recd from Ngoc Weir after review of ambulatory EKG  Pls let Ilana know that EKG indeed showed recurrent AFib - not surprising given her recent extensive ablation 3/24.      1. Confirm Amiodarone 200 mg BID (3/25--4/8)- she confirmed she is still taking 200 mg BID  2. Continued Xarelto - pls confirm she's missed NO doses of this.she verified she is 100% sure she has not missed any doses     3. As she's been in this since 3/31 (as far as she can tell) I think we need to do DCCV.              * I've ordered DCCV. No LATRELL as long as no missed doses of AC              * Pls set her up for phone or video H/P for DCCV with me - can add on over lunch if needed for timing. Explained recommendations w pt. Scheduled her for virtual visit w Ngoc today at 1230. Discussed w scheduling and next DCCV is this Friday 4/8 with arrival at 830 am. Pt will need COVID test so will try to schedule in [place] . Message to scheduling to contact pt.  If COVID test is not able to be arranged in time, pt is ok with Tues DCCV with arrival at 830 am at [facility]      4. Continue the higher dose of amiodarone 200 mg BID until the DCCV at least ... don't go down to the lower dose on 4/9 at this time.-    Tanna 1153 am

## 2022-04-05 NOTE — TELEPHONE ENCOUNTER
Pls let Ilana know that EKG indeed showed recurrent AFib - not surprising given her recent extensive ablation 3/24.     1. Confirm Amiodarone 200 mg BID (3/25--4/8)  2. Continued Xarelto - pls confirm she's missed NO doses of this.    3. As she's been in this since 3/31 (as far as she can tell) I think we need to do DCCV.   * I've ordered DCCV. No LATRELL as long as no missed doses of AC   * Pls set her up for phone or video H/P for DCCV with me - can add on over lunch if needed for timing.    4. Continue the higher dose of amiodarone 200 mg BID until the DCCV at least ... don't go down to the lower dose on 4/9 at this time.      Zuleika Grossman

## 2022-04-05 NOTE — PATIENT INSTRUCTIONS
"Ilana - It was nice to speak with you today!    1. Reviewed EKG done in [place] today ... back in AFib ~101 bpm  2. Reviewed that you're not feeling \"terrible\" just know you're back in AFib  3. Reviewed that the groin site numbness is improving. I'm glad the feelings with swallowing have improved!    PLAN:  1. Cardioversion Frdaiy 4/8/2022   * COVID test tomorrow 4/6. To limit contacts between testing and procedure    * Arrive 0830 for ~1030 procedure on Friday 4/8   * Nothing to eat/drink after midnight except meds with a small sip of water    * HOLD calcium and iron supplements morning of procedure    2. Stay on higher dose amiodarone (200 mg twice a day) through Monday 4/11. Decrease dose to 200 mg daily on Tuesday 4/12    3. Virtual visit with me in 1-2 weeks to ensure you're still doing well after cardioversion (Simran will call to set up)    4. STAY ON XARELTO 20 MG DAILY    CALL if you have any issues/questions/concerns! [phone].   CALL if you think you go back in normal rhythm before your ablation ... if you spontaneously convert, we will cancel the cardioversion!!!!!    "

## 2022-04-05 NOTE — LETTER
"4/5/2022    Prema Rodgers MD  [address] [facility] [address]    RE: Ilana TAVARES Kip       Dear Colleague,     I had the pleasure of seeing Ilana Shin in the [facility].  Ilana is a 66 year old who is being evaluated via a billable video visit.      How would you like to obtain your AVS? Mail a copy  If the video visit is dropped, the invitation should be resent by: Text to cell phone: [phone]  Will anyone else be joining your video visit? No         Vitals - Patient Reported 10/7/2020 4/5/2022   Height (Patient Reported) - 5' 3\"   Weight (Patient Reported) 170 lb 179 lb   BMI (Based on Pt Reported Ht/Wt) - 31.71 kg/m2   Systolic (Patient Reported) 115 108   Diastolic (Patient Reported) 87 81   Pulse (Patient Reported) 96 81     CC:  Pre-procedural H&P for DCCV    VITALS:    Vitals - Patient Reported 10/7/2020 4/5/2022   Height (Patient Reported) - 5' 3\"   Weight (Patient Reported) 170 lb 179 lb   BMI (Based on Pt Reported Ht/Wt) - 31.71 kg/m2   Systolic (Patient Reported) 115 108   Diastolic (Patient Reported) 87 81   Pulse (Patient Reported) 96 81       BRIEF HPI:  Ilana is a 66 year old yo F who sees Dr. Hernandez for h/o:    1. H/o Persistent AFib - dx'd 8/2021. S/p extensive AFib ablation 3/24/2022 (DCCV/PVI x 4/LA posterior wall roof and floor/DCCV/empiric CTI). Now with recurrent coarse AFib despite amiodarone load.  2. HTN   3. Moderate Aortic Stenosis  - noted on echo 10/2021. EMILY 1.2 cm2 and mean gradient 19 mmHg      I just saw Ilana 3/31/2022 at which time we reviewed that we reviewed her extensive ablation. She described some \"numbness\" around the R groin site, as well as an awareness of \"food going down\" whenever she swallowed. She remained on amiodarone 200 mg BID x 2 weeks, with plans to decrease this to 200 mg daily starting 4/9. Diltiazem 120 mg daily had been stopped at time of procedure. She remained on Xarelto therapy. She was in SR at that time but had some brief episodes " "of AFib following the ablation.    She called in 4/4/2022 noting she thought she'd been back in AFib since the evening I saw her 3/31.  Due to the prolonged episode (now 5 days) I ordered DCCV.    INTERVAL HISTORY:  Ilnaa overall is feeling \"OK\" but disappointed she's back in AFib. As far as she can tell based on her HR and BP cuff \"irregular\" warning light going off, she's been in this rhythm consistently since 3/31 PM.    No CP. No SOB. No dizziness, lightheadedness.  Remains without fever/chills. No cough.    Notes that the swallowing is easier.  The groin site is now becoming less numb.    DIAGNOSTICS:  EKG today on amiodarone 200 mg BID - Coarse AFib 101 bpm  EKG  on amiodarone 200 mg BID showed NSR 69 bpm  LATRELL 3/24/2022 -EF 60%.  Normal RV.  Trace MR.  2+ TR.  ZioPatch 1/2022 - AFib with average heart rate 93 bpm (53--220 bpm)    REVIEW OF SYSTEMS:  Negative with the exception of that noted above    PHYSICAL EXAM     Vitals - Patient Reported 10/7/2020 4/5/2022   Height (Patient Reported) - 5' 3\"   Weight (Patient Reported) 170 lb 179 lb   BMI (Based on Pt Reported Ht/Wt) - 31.71 kg/m2   Systolic (Patient Reported) 115 108   Diastolic (Patient Reported) 87 81   Pulse (Patient Reported) 96 81       Physical Exam  GENERAL: Healthy, alert and no distress  EYES: Eyes grossly normal to inspection.  No discharge or erythema, or obvious scleral/conjunctival abnormalities.  RESP: No audible wheeze, cough, or visible cyanosis.  No visible retractions or increased work of breathing.    SKIN: Visible skin clear. No significant rash, abnormal pigmentation or lesions.  NEURO: Cranial nerves grossly intact.  Mentation and speech appropriate for age.  PSYCH: Mentation appears normal, affect normal/bright, judgement and insight intact, normal speech and appearance well-groomed.      PLAN:  1. DCCV  2. Extend Amiodarone load 200 mg BID until Monday 4/11, then decrease to 200 mg daily on Tuesday 4/12    ASSESSMENT/PLAN:    1. " "Recurrent atrial fibrillation    As above, had had some mild recurrent episodes, but now has been in this since evening of Thursday 3/31 (>5 days)    Rate \"isn't terrible\" and notes she's less sx'c when HR is closer to 100 bpm    Remains on Xarelto and confirms she's not missed any doses.     PLAN:  DCCV. Confirms she's missed NO doses of Xarelto, so does not need LATRELL. We discussed Risk, Benefits and Indication of proceeding with direct current cardioversion (DCCV), including but not limited to use of anesthesia, surface burns, [id number] arrhythmias requiring further treatment, discomfort and stroke.  The patient voiced understanding and understands that a  will be needed given the use of conscious sedation. A consent form will be signed by the procedural physician   * COVID test tomorrow 4/6. To limit contacts between testing and procedure    * Arrive 0830 for ~1030 procedure on Friday 4/8   * Nothing to eat/drink after midnight except meds with a small sip of water   * HOLD calcium and iron supplements morning of procedure      Asked her to continue amiodarone 200 mg BID until Monday 4/11. On the 12th back down to 200 mg daily    Virtual appt with me following      2. HTN    On losartan 100 mg daily    BPs remains good off of Diltiazem 120 mg daily     PLAN:    Continue to monitor        3. On amiodarone therapy    Expectation is that she will be on this for ~3m post AFib ablation done 3/24    TSH 8/2021 wnl on levothyroxine    CMP 8/2021 with elevated AST, ALT (88, 66, respectively) which Dr. Rodgers felt d/t fatty infiltration of the liver     PLAN:     If amiodarone used >3 m would get additional testing    CURRENT MEDICATIONS:  Current Outpatient Medications   Medication Sig Dispense Refill     acetaminophen (TYLENOL) 325 MG tablet Take 2 tablets (650 mg) by mouth every 4 hours as needed for mild pain 50 tablet 0     amiodarone (PACERONE) 200 MG tablet Take 1 tablet (200 mg) by mouth 2 times daily For " 2 weeks then one tablet (200 mg) daily 90 tablet 1     amitriptyline (ELAVIL) 50 MG tablet Take 1 tablet (50 mg) by mouth At Bedtime (Patient taking differently: Take 25 mg by mouth At Bedtime ) 90 tablet 0     CALCIUM PO        Ferrous Gluconate 240 (27 Fe) MG TABS Take by mouth every other day        fluticasone (FLONASE) 50 MCG/ACT nasal spray Spray 2 sprays into both nostrils daily (Patient taking differently: Spray 2 sprays into both nostrils daily as needed ) 16 g 0     fluticasone-salmeterol (ADVAIR) 100-50 MCG/DOSE inhaler Inhale 1 puff into the lungs every 12 hours (Patient taking differently: Inhale 1 puff into the lungs 2 times daily as needed ) 3 each 1     levothyroxine (SYNTHROID/LEVOTHROID) 100 MCG tablet TAKE 1 TABLET DAILY (MEDICATION IS BEING FILLED FOR 1 TIME ONLY. NEED TO BE SEEN. IT HAS BEEN MORE THAN 1 YEAR SINCE LAST VISIT) 90 tablet 2     losartan (COZAAR) 100 MG tablet TAKE 1 TABLET DAILY (MEDICATION IS BEING FILLED FOR 1 TIME ONLY. NEED TO BE SEEN. IT HAS BEEN MORE THAN 1 YEAR SINCE LAST VISIT) 90 tablet 3     omeprazole (PRILOSEC OTC) 20 MG EC tablet Take 1 tablet (20 mg) by mouth daily 30 tablet 0     rivaroxaban ANTICOAGULANT (XARELTO ANTICOAGULANT) 20 MG TABS tablet Take 1 tablet (20 mg) by mouth daily (with dinner) 1 tablet 0         ORDERS PLACED:  No orders of the defined types were placed in this encounter.    No orders of the defined types were placed in this encounter.    There are no discontinued medications.      Encounter Diagnosis   Name Primary?     Paroxysmal atrial fibrillation (H) Yes         ALLERGIES     Allergies   Allergen Reactions     Codeine Sulfate GI Disturbance       PAST MEDICAL HISTORY:  Past Medical History:   Diagnosis Date     Benign essential hypertension      Bradycardia      CKD (chronic kidney disease), stage III (H)      Fibromyalgia      Hypothyroidism      Moderate aortic stenosis      Obesity (BMI 30-39.9)      Osteopenia      Pure  hypercholesterolemia      Uncomplicated asthma        PAST SURGICAL HISTORY:  Past Surgical History:   Procedure Laterality Date     ARTHROSCOPY KNEE WITH LATERAL MENISCECTOMY Right 3/8/2019    Procedure: RIGHT KNEE  ARTHROSCOPIC, CHONDROPLASTY OF THE MEDIAL CONDRA,  PARTIAL  LATERAL MENISCECTOMY;  Surgeon: Vinay Laird MD;  Location:  OR     BUNIONECTOMY Right 2014     BUNIONECTOMY LADONNA, REPAIR HAMMER TOE(S), COMBINED  2003    Left foot     CHOLECYSTECTOMY, OPEN       EP ABLATION FOCAL AFIB N/A 3/24/2022    Procedure: Ablation Focal Atrial Fibrillation;  Surgeon: Sadi Hernandez MD;  Location:  HEART CARDIAC CATH LAB     RELEASE CARPAL TUNNEL BILATERAL       TUBAL LIGATION  1984    age 29     ZZC LAPAROSCOPIC JASS EN Y GASTROJEJUNOSTOMY  2006       FAMILY HISTORY:  Family History   Problem Relation Age of Onset     Hypertension Mother      Deep Vein Thrombosis (DVT) Mother         x2 - both provoked (trauma, surgery)     Diabetes Type 2  Father      Hypertension Father      Breast Cancer Paternal Grandmother      Breast Cancer Sister 45     Myocardial Infarction Sister 50     Deep Vein Thrombosis (DVT) Sister         provoked (surgery)     Lung Cancer Brother         smoker     Hypertension Sister         x2     Cerebrovascular Disease No family hx of      Colon Cancer No family hx of      Ovarian Cancer No family hx of        SOCIAL HISTORY:  Social History     Socioeconomic History     Marital status:      Spouse name: Not on file     Number of children: Not on file     Years of education: Not on file     Highest education level: Not on file   Occupational History     Occupation: Retired - instrument tech   Tobacco Use     Smoking status: Former Smoker     Packs/day: 1.00     Years: 20.00     Pack years: 20.00     Types: Cigarettes     Quit date: 2002     Years since quittin.2     Smokeless tobacco: Never Used   Substance and Sexual Activity     Alcohol use: No     Drug  use: No     Sexual activity: Not Currently   Other Topics Concern     Parent/sibling w/ CABG, MI or angioplasty before 65F 55M? No      Service Not Asked     Blood Transfusions Not Asked     Caffeine Concern Not Asked     Occupational Exposure Not Asked     Hobby Hazards Not Asked     Sleep Concern Not Asked     Stress Concern Not Asked     Weight Concern Not Asked     Special Diet Not Asked     Back Care Not Asked     Exercise Not Asked     Bike Helmet Yes     Seat Belt Yes     Self-Exams Not Asked   Social History Narrative    .    Lives with  and two grandchildren.     2 adult kids.    5 grand kids.    Walks regularly.      Social Determinants of Health     Financial Resource Strain: Not on file   Food Insecurity: Not on file   Transportation Needs: Not on file   Physical Activity: Not on file   Stress: Not on file   Social Connections: Not on file   Intimate Partner Violence: Not on file   Housing Stability: Not on file       Video-Visit Details    Type of service:  Video Visit    Video Start Time: 1235  Video End Time:  1250  Duration: 15 minutes    Originating Location (pt. Location): Home    Distant Location (provider location):  [facility] [place]     Platform used for Video Visit: Dinesh Weir [facility]-C [facility]     Thank you for allowing me to participate in the care of your patient.      Sincerely,     Anika Weir PA-C     [facility]  cc:   No referring provider defined for this encounter.

## 2022-04-05 NOTE — PROGRESS NOTES
"Ilana is a 66 year old who is being evaluated via a billable video visit.      How would you like to obtain your AVS? Mail a copy  If the video visit is dropped, the invitation should be resent by: Text to cell phone: [phone]  Will anyone else be joining your video visit? No         Vitals - Patient Reported 10/7/2020 4/5/2022   Height (Patient Reported) - 5' 3\"   Weight (Patient Reported) 170 lb 179 lb   BMI (Based on Pt Reported Ht/Wt) - 31.71 kg/m2   Systolic (Patient Reported) 115 108   Diastolic (Patient Reported) 87 81   Pulse (Patient Reported) 96 81     CC:  Pre-procedural H&P for DCCV    VITALS:    Vitals - Patient Reported 10/7/2020 4/5/2022   Height (Patient Reported) - 5' 3\"   Weight (Patient Reported) 170 lb 179 lb   BMI (Based on Pt Reported Ht/Wt) - 31.71 kg/m2   Systolic (Patient Reported) 115 108   Diastolic (Patient Reported) 87 81   Pulse (Patient Reported) 96 81       BRIEF HPI:  Ilana is a 66 year old yo F who sees Dr. Hernandez for h/o:    1. H/o Persistent AFib - dx'd 8/2021. S/p extensive AFib ablation 3/24/2022 (DCCV/PVI x 4/LA posterior wall roof and floor/DCCV/empiric CTI). Now with recurrent coarse AFib despite amiodarone load.  2. HTN   3. Moderate Aortic Stenosis  - noted on echo 10/2021. EMILY 1.2 cm2 and mean gradient 19 mmHg      I just saw Ilana 3/31/2022 at which time we reviewed that we reviewed her extensive ablation. She described some \"numbness\" around the R groin site, as well as an awareness of \"food going down\" whenever she swallowed. She remained on amiodarone 200 mg BID x 2 weeks, with plans to decrease this to 200 mg daily starting 4/9. Diltiazem 120 mg daily had been stopped at time of procedure. She remained on Xarelto therapy. She was in SR at that time but had some brief episodes of AFib following the ablation.    She called in 4/4/2022 noting she thought she'd been back in AFib since the evening I saw her 3/31.  Due to the prolonged episode (now 5 days) I ordered " "DCCV.    INTERVAL HISTORY:  Ilana overall is feeling \"OK\" but disappointed she's back in AFib. As far as she can tell based on her HR and BP cuff \"irregular\" warning light going off, she's been in this rhythm consistently since 3/31 PM.    No CP. No SOB. No dizziness, lightheadedness.  Remains without fever/chills. No cough.    Notes that the swallowing is easier.  The groin site is now becoming less numb.    DIAGNOSTICS:  EKG today on amiodarone 200 mg BID - Coarse AFib 101 bpm  EKG  on amiodarone 200 mg BID showed NSR 69 bpm  LATRELL 3/24/2022 -EF 60%.  Normal RV.  Trace MR.  2+ TR.  ZioPatch 1/2022 - AFib with average heart rate 93 bpm (53--220 bpm)    REVIEW OF SYSTEMS:  Negative with the exception of that noted above    PHYSICAL EXAM     Vitals - Patient Reported 10/7/2020 4/5/2022   Height (Patient Reported) - 5' 3\"   Weight (Patient Reported) 170 lb 179 lb   BMI (Based on Pt Reported Ht/Wt) - 31.71 kg/m2   Systolic (Patient Reported) 115 108   Diastolic (Patient Reported) 87 81   Pulse (Patient Reported) 96 81       Physical Exam  GENERAL: Healthy, alert and no distress  EYES: Eyes grossly normal to inspection.  No discharge or erythema, or obvious scleral/conjunctival abnormalities.  RESP: No audible wheeze, cough, or visible cyanosis.  No visible retractions or increased work of breathing.    SKIN: Visible skin clear. No significant rash, abnormal pigmentation or lesions.  NEURO: Cranial nerves grossly intact.  Mentation and speech appropriate for age.  PSYCH: Mentation appears normal, affect normal/bright, judgement and insight intact, normal speech and appearance well-groomed.      PLAN:  1. DCCV  2. Extend Amiodarone load 200 mg BID until Monday 4/11, then decrease to 200 mg daily on Tuesday 4/12    ASSESSMENT/PLAN:    1. Recurrent atrial fibrillation    As above, had had some mild recurrent episodes, but now has been in this since evening of Thursday 3/31 (>5 days)    Rate \"isn't terrible\" and notes she's " less sx'c when HR is closer to 100 bpm    Remains on Xarelto and confirms she's not missed any doses.     PLAN:  DCCV. Confirms she's missed NO doses of Xarelto, so does not need LATRELL. We discussed Risk, Benefits and Indication of proceeding with direct current cardioversion (DCCV), including but not limited to use of anesthesia, surface burns, [id number] arrhythmias requiring further treatment, discomfort and stroke.  The patient voiced understanding and understands that a  will be needed given the use of conscious sedation. A consent form will be signed by the procedural physician   * COVID test tomorrow 4/6. To limit contacts between testing and procedure    * Arrive 0830 for ~1030 procedure on Friday 4/8   * Nothing to eat/drink after midnight except meds with a small sip of water   * HOLD calcium and iron supplements morning of procedure      Asked her to continue amiodarone 200 mg BID until Monday 4/11. On the 12th back down to 200 mg daily    Virtual appt with me following      2. HTN    On losartan 100 mg daily    BPs remains good off of Diltiazem 120 mg daily     PLAN:    Continue to monitor        3. On amiodarone therapy    Expectation is that she will be on this for ~3m post AFib ablation done 3/24    TSH 8/2021 wnl on levothyroxine    CMP 8/2021 with elevated AST, ALT (88, 66, respectively) which Dr. Rodgers felt d/t fatty infiltration of the liver     PLAN:     If amiodarone used >3 m would get additional testing    CURRENT MEDICATIONS:  Current Outpatient Medications   Medication Sig Dispense Refill     acetaminophen (TYLENOL) 325 MG tablet Take 2 tablets (650 mg) by mouth every 4 hours as needed for mild pain 50 tablet 0     amiodarone (PACERONE) 200 MG tablet Take 1 tablet (200 mg) by mouth 2 times daily For 2 weeks then one tablet (200 mg) daily 90 tablet 1     amitriptyline (ELAVIL) 50 MG tablet Take 1 tablet (50 mg) by mouth At Bedtime (Patient taking differently: Take 25 mg by mouth At  Bedtime ) 90 tablet 0     CALCIUM PO        Ferrous Gluconate 240 (27 Fe) MG TABS Take by mouth every other day        fluticasone (FLONASE) 50 MCG/ACT nasal spray Spray 2 sprays into both nostrils daily (Patient taking differently: Spray 2 sprays into both nostrils daily as needed ) 16 g 0     fluticasone-salmeterol (ADVAIR) 100-50 MCG/DOSE inhaler Inhale 1 puff into the lungs every 12 hours (Patient taking differently: Inhale 1 puff into the lungs 2 times daily as needed ) 3 each 1     levothyroxine (SYNTHROID/LEVOTHROID) 100 MCG tablet TAKE 1 TABLET DAILY (MEDICATION IS BEING FILLED FOR 1 TIME ONLY. NEED TO BE SEEN. IT HAS BEEN MORE THAN 1 YEAR SINCE LAST VISIT) 90 tablet 2     losartan (COZAAR) 100 MG tablet TAKE 1 TABLET DAILY (MEDICATION IS BEING FILLED FOR 1 TIME ONLY. NEED TO BE SEEN. IT HAS BEEN MORE THAN 1 YEAR SINCE LAST VISIT) 90 tablet 3     omeprazole (PRILOSEC OTC) 20 MG EC tablet Take 1 tablet (20 mg) by mouth daily 30 tablet 0     rivaroxaban ANTICOAGULANT (XARELTO ANTICOAGULANT) 20 MG TABS tablet Take 1 tablet (20 mg) by mouth daily (with dinner) 1 tablet 0         ORDERS PLACED:  No orders of the defined types were placed in this encounter.    No orders of the defined types were placed in this encounter.    There are no discontinued medications.      Encounter Diagnosis   Name Primary?     Paroxysmal atrial fibrillation (H) Yes         ALLERGIES     Allergies   Allergen Reactions     Codeine Sulfate GI Disturbance       PAST MEDICAL HISTORY:  Past Medical History:   Diagnosis Date     Benign essential hypertension      Bradycardia      CKD (chronic kidney disease), stage III (H)      Fibromyalgia      Hypothyroidism      Moderate aortic stenosis      Obesity (BMI 30-39.9)      Osteopenia      Pure hypercholesterolemia      Uncomplicated asthma        PAST SURGICAL HISTORY:  Past Surgical History:   Procedure Laterality Date     ARTHROSCOPY KNEE WITH LATERAL MENISCECTOMY Right 3/8/2019     Procedure: RIGHT KNEE  ARTHROSCOPIC, CHONDROPLASTY OF THE MEDIAL CONDRA,  PARTIAL  LATERAL MENISCECTOMY;  Surgeon: Vinay Laird MD;  Location: [facility]     BUNIONECTOMY Right 2014     BUNIONECTOMY LADONNA, REPAIR HAMMER TOE(S), COMBINED  2003    Left foot     CHOLECYSTECTOMY, OPEN       EP ABLATION FOCAL AFIB N/A 3/24/2022    Procedure: Ablation Focal Atrial Fibrillation;  Surgeon: Sadi Hernandez MD;  Location:  [facility]     RELEASE CARPAL TUNNEL BILATERAL  1999     TUBAL LIGATION  1984    age 29     ZZC LAPAROSCOPIC JASS EN Y GASTROJEJUNOSTOMY  2006       FAMILY HISTORY:  Family History   Problem Relation Age of Onset     Hypertension Mother      Deep Vein Thrombosis (DVT) Mother         x2 - both provoked (trauma, surgery)     Diabetes Type 2  Father      Hypertension Father      Breast Cancer Paternal Grandmother      Breast Cancer Sister 45     Myocardial Infarction Sister 50     Deep Vein Thrombosis (DVT) Sister         provoked (surgery)     Lung Cancer Brother         smoker     Hypertension Sister         x2     Cerebrovascular Disease No family hx of      Colon Cancer No family hx of      Ovarian Cancer No family hx of        SOCIAL HISTORY:  Social History     Socioeconomic History     Marital status:      Spouse name: Not on file     Number of children: Not on file     Years of education: Not on file     Highest education level: Not on file   Occupational History     Occupation: Retired - instrument tech   Tobacco Use     Smoking status: Former Smoker     Packs/day: 1.00     Years: 20.00     Pack years: 20.00     Types: Cigarettes     Quit date: 2002     Years since quittin.2     Smokeless tobacco: Never Used   Substance and Sexual Activity     Alcohol use: No     Drug use: No     Sexual activity: Not Currently   Other Topics Concern     Parent/sibling w/ CABG, MI or angioplasty before 65F 55M? No      Service Not Asked     Blood Transfusions Not Asked      Caffeine Concern Not Asked     Occupational Exposure Not Asked     Hobby Hazards Not Asked     Sleep Concern Not Asked     Stress Concern Not Asked     Weight Concern Not Asked     Special Diet Not Asked     Back Care Not Asked     Exercise Not Asked     Bike Helmet Yes     Seat Belt Yes     Self-Exams Not Asked   Social History Narrative    .    Lives with  and two grandchildren.     2 adult kids.    5 grand kids.    Walks regularly.      Social Determinants of Health     Financial Resource Strain: Not on file   Food Insecurity: Not on file   Transportation Needs: Not on file   Physical Activity: Not on file   Stress: Not on file   Social Connections: Not on file   Intimate Partner Violence: Not on file   Housing Stability: Not on file       Video-Visit Details    Type of service:  Video Visit    Video Start Time: 1235  Video End Time:  1250  Duration: 15 minutes    Originating Location (pt. Location): Home    Distant Location (provider location):  [facility]     Platform used for Video Visit: Dinesh Weir PA-C MSPAS

## 2022-04-06 ENCOUNTER — LAB (OUTPATIENT)
Dept: LAB | Facility: CLINIC | Age: 67
End: 2022-04-06
Attending: PHYSICIAN ASSISTANT
Payer: COMMERCIAL

## 2022-04-06 ENCOUNTER — HOSPITAL ENCOUNTER (OUTPATIENT)
Facility: CLINIC | Age: 67
End: 2022-04-06
Payer: COMMERCIAL

## 2022-04-06 DIAGNOSIS — Z11.59 ENCOUNTER FOR SCREENING FOR OTHER VIRAL DISEASES: Primary | ICD-10-CM

## 2022-04-06 DIAGNOSIS — I48.0 PAROXYSMAL ATRIAL FIBRILLATION (H): ICD-10-CM

## 2022-04-06 DIAGNOSIS — Z11.59 ENCOUNTER FOR SCREENING FOR OTHER VIRAL DISEASES: ICD-10-CM

## 2022-04-06 LAB — SARS-COV-2 RNA RESP QL NAA+PROBE: NEGATIVE

## 2022-04-06 PROCEDURE — U0003 INFECTIOUS AGENT DETECTION BY NUCLEIC ACID (DNA OR RNA); SEVERE ACUTE RESPIRATORY SYNDROME CORONAVIRUS 2 (SARS-COV-2) (CORONAVIRUS DISEASE [COVID-19]), AMPLIFIED PROBE TECHNIQUE, MAKING USE OF HIGH THROUGHPUT TECHNOLOGIES AS DESCRIBED BY CMS-2020-01-R: HCPCS

## 2022-04-06 PROCEDURE — U0005 INFEC AGEN DETEC AMPLI PROBE: HCPCS

## 2022-04-07 ENCOUNTER — HOSPITAL ENCOUNTER (OUTPATIENT)
Dept: CARDIOLOGY | Facility: CLINIC | Age: 67
Discharge: HOME | End: 2022-04-07
Attending: PHYSICIAN ASSISTANT | Admitting: PHYSICIAN ASSISTANT
Payer: COMMERCIAL

## 2022-04-07 ENCOUNTER — TELEPHONE (OUTPATIENT)
Dept: CARDIOLOGY | Facility: CLINIC | Age: 67
End: 2022-04-07
Payer: COMMERCIAL

## 2022-04-07 DIAGNOSIS — I48.0 PAROXYSMAL ATRIAL FIBRILLATION (H): ICD-10-CM

## 2022-04-07 DIAGNOSIS — I48.0 PAROXYSMAL ATRIAL FIBRILLATION (H): Primary | ICD-10-CM

## 2022-04-07 PROCEDURE — 93010 ELECTROCARDIOGRAM REPORT: CPT | Performed by: PHYSICIAN ASSISTANT

## 2022-04-07 PROCEDURE — 93005 ELECTROCARDIOGRAM TRACING: CPT

## 2022-04-07 RX ORDER — AMIODARONE HYDROCHLORIDE 200 MG/1
TABLET ORAL
Qty: 90 TABLET | Refills: 1 | Status: SHIPPED | OUTPATIENT
Start: 2022-04-07 | End: 2022-04-11

## 2022-04-07 NOTE — TELEPHONE ENCOUNTER
Spoke to patient regarding recommendations per NGA Cochran:    1. Cancel DCCV tomorrow - Simran is aware   2. Continue amiodarone 200 mg BID until Monday AM ... on Monday AM decrease to 200 mg daily - pls update Epic med list     3. OK to cancel my 4/18 post-DCCV follow-up video visit with her! - I've asked Simran to cancel, just let pt know     4. See Dr. Hernandez 7/2022 as planned, 3 m AFib ablation     5. She should call if AFib returns lasting >24 hours    Patient provided verbal understanding regarding above.  SALVADOR Billings

## 2022-04-07 NOTE — TELEPHONE ENCOUNTER
Message left for patient to call with recommendations per NGA Cochran.  Awaiting return call.  SALVADOR Billings

## 2022-04-07 NOTE — TELEPHONE ENCOUNTER
Pls let Ilana know that she's indeed back in SR!    1. Cancel DCCV tomorrow - Simran is aware   2. Continue amiodarone 200 mg BID until Monday AM ... on Monday AM decrease to 200 mg daily - pls update Epic med list    3. OK to cancel my 4/18 post-DCCV follow-up video visit with her! - I've asked Simran to cancel, just let pt know    4. See Dr. Hernandez 7/2022 as planned, 3 m AFib ablation    5. She should call if AFib returns lasting >24 hours    Zuleika Grossman

## 2022-04-07 NOTE — TELEPHONE ENCOUNTER
Spoke to Ngoc regarding patients symptoms.  She wants EKG completed to confirm rhythm.  [place] [facility] will do EKG once patient arrives.  Discussed with patient plan and she will go to clinic for EKG.  Orders placed in epic.  SALVADOR Billings

## 2022-04-07 NOTE — TELEPHONE ENCOUNTER
Patient called to report she feels she currently is in NSR.  She woke up this morning was out of AF and went back into it after being up for 2 hours and went back into NSR.  She is scheduled for cardioversion tomorrow.  Currently /62 HR 64.  Will have patient call at the end of today with update.  Will update NGA Cochran regarding above.  SALVADOR Billings

## 2022-04-11 ENCOUNTER — TELEPHONE (OUTPATIENT)
Dept: CARDIOLOGY | Facility: CLINIC | Age: 67
End: 2022-04-11
Payer: COMMERCIAL

## 2022-04-11 DIAGNOSIS — I48.0 PAROXYSMAL ATRIAL FIBRILLATION (H): ICD-10-CM

## 2022-04-11 RX ORDER — AMIODARONE HYDROCHLORIDE 200 MG/1
TABLET ORAL
Qty: 90 TABLET | Refills: 1 | Status: SHIPPED | OUTPATIENT
Start: 2022-04-11 | End: 2022-07-19

## 2022-04-13 DIAGNOSIS — G47.00 INSOMNIA, UNSPECIFIED TYPE: ICD-10-CM

## 2022-04-14 RX ORDER — AMITRIPTYLINE HYDROCHLORIDE 50 MG/1
TABLET ORAL
Qty: 90 TABLET | Refills: 0 | Status: SHIPPED | OUTPATIENT
Start: 2022-04-14 | End: 2022-07-19

## 2022-06-13 ENCOUNTER — MYC REFILL (OUTPATIENT)
Dept: INTERNAL MEDICINE | Facility: CLINIC | Age: 67
End: 2022-06-13
Payer: COMMERCIAL

## 2022-06-13 DIAGNOSIS — E03.9 HYPOTHYROIDISM, UNSPECIFIED TYPE: ICD-10-CM

## 2022-06-15 RX ORDER — LEVOTHYROXINE SODIUM 100 UG/1
TABLET ORAL
Qty: 90 TABLET | Refills: 0 | OUTPATIENT
Start: 2022-06-15

## 2022-06-15 NOTE — TELEPHONE ENCOUNTER
This refill request is a duplicate previously sent to pharmacy or currently in review.  Refused medication to pharmacy as duplicate.   Melissa Hair RN

## 2022-07-19 ENCOUNTER — HOSPITAL ENCOUNTER (OUTPATIENT)
Dept: CARDIOLOGY | Facility: CLINIC | Age: 67
Discharge: HOME | End: 2022-07-19
Attending: INTERNAL MEDICINE | Admitting: INTERNAL MEDICINE
Payer: COMMERCIAL

## 2022-07-19 ENCOUNTER — OFFICE VISIT (OUTPATIENT)
Dept: CARDIOLOGY | Facility: CLINIC | Age: 67
End: 2022-07-19
Attending: INTERNAL MEDICINE
Payer: COMMERCIAL

## 2022-07-19 VITALS
OXYGEN SATURATION: 100 % | BODY MASS INDEX: 30.89 KG/M2 | HEART RATE: 50 BPM | WEIGHT: 174.4 LBS | TEMPERATURE: 97.5 F | DIASTOLIC BLOOD PRESSURE: 63 MMHG | SYSTOLIC BLOOD PRESSURE: 135 MMHG

## 2022-07-19 DIAGNOSIS — I48.0 PAROXYSMAL ATRIAL FIBRILLATION (H): ICD-10-CM

## 2022-07-19 PROCEDURE — 93010 ELECTROCARDIOGRAM REPORT: CPT | Performed by: INTERNAL MEDICINE

## 2022-07-19 PROCEDURE — 93005 ELECTROCARDIOGRAM TRACING: CPT

## 2022-07-19 PROCEDURE — 99213 OFFICE O/P EST LOW 20 MIN: CPT | Performed by: INTERNAL MEDICINE

## 2022-07-19 NOTE — PROGRESS NOTES
HPI and Plan:   See dictation  [id number]  Today's clinic visit entailed:  The following tests were independently interpreted by me as noted in my documentation: ecg,   15 minutes spent on the date of the encounter doing chart review   Provider  Link to MDM Help Grid     The level of medical decision making during this visit was of moderate complexity.      No orders of the defined types were placed in this encounter.      No orders of the defined types were placed in this encounter.      Medications Discontinued During This Encounter   Medication Reason     amitriptyline (ELAVIL) 50 MG tablet Therapy completed     omeprazole (PRILOSEC OTC) 20 MG EC tablet Therapy completed         Encounter Diagnosis   Name Primary?     Paroxysmal atrial fibrillation (H)        CURRENT MEDICATIONS:  Current Outpatient Medications   Medication Sig Dispense Refill     acetaminophen (TYLENOL) 325 MG tablet Take 2 tablets (650 mg) by mouth every 4 hours as needed for mild pain 50 tablet 0     amiodarone (PACERONE) 200 MG tablet Take 200mg po every day (starting 4/11) 90 tablet 1     CALCIUM PO        Ferrous Gluconate 240 (27 Fe) MG TABS Take by mouth every other day        fluticasone (FLONASE) 50 MCG/ACT nasal spray Spray 2 sprays into both nostrils daily (Patient taking differently: Spray 2 sprays into both nostrils daily as needed) 16 g 0     fluticasone-salmeterol (ADVAIR) 100-50 MCG/DOSE inhaler Inhale 1 puff into the lungs every 12 hours (Patient taking differently: Inhale 1 puff into the lungs 2 times daily as needed) 3 each 1     levothyroxine (SYNTHROID/LEVOTHROID) 100 MCG tablet TAKE 1 TABLET DAILY (MEDICATION IS BEING FILLED FOR 1 TIME ONLY. NEED TO BE SEEN. IT HAS BEEN MORE THAN 1 YEAR SINCE LAST VISIT) 90 tablet 0     losartan (COZAAR) 100 MG tablet TAKE 1 TABLET DAILY (MEDICATION IS BEING FILLED FOR 1 TIME ONLY. NEED TO BE SEEN. IT HAS BEEN MORE THAN 1 YEAR SINCE LAST VISIT) 90 tablet 3     rivaroxaban ANTICOAGULANT  (XARELTO ANTICOAGULANT) 20 MG TABS tablet Take 1 tablet (20 mg) by mouth daily (with dinner) 90 tablet 0       ALLERGIES     Allergies   Allergen Reactions     Codeine Sulfate GI Disturbance       PAST MEDICAL HISTORY:  Past Medical History:   Diagnosis Date     Benign essential hypertension      Bradycardia      CKD (chronic kidney disease), stage III (H)      Fibromyalgia      Hypothyroidism      Moderate aortic stenosis      Obesity (BMI 30-39.9)      Osteopenia      Pure hypercholesterolemia      Uncomplicated asthma        PAST SURGICAL HISTORY:  Past Surgical History:   Procedure Laterality Date     ARTHROSCOPY KNEE WITH LATERAL MENISCECTOMY Right 3/8/2019    Procedure: RIGHT KNEE  ARTHROSCOPIC, CHONDROPLASTY OF THE MEDIAL CONDRA,  PARTIAL  LATERAL MENISCECTOMY;  Surgeon: Vinay Laird MD;  Location:  OR     BUNIONECTOMY Right 8/2014     BUNIONECTOMY LADONNA, REPAIR HAMMER TOE(S), COMBINED  09/2003    Left foot     CHOLECYSTECTOMY, OPEN  1994     EP ABLATION FOCAL AFIB N/A 3/24/2022    Procedure: Ablation Focal Atrial Fibrillation;  Surgeon: Sadi Hernandez MD;  Location:  [facility]     RELEASE CARPAL TUNNEL BILATERAL  1999     TUBAL LIGATION  1984    age 29     ZZC LAPAROSCOPIC JASS EN Y GASTROJEJUNOSTOMY  2006       FAMILY HISTORY:  Family History   Problem Relation Age of Onset     Hypertension Mother      Deep Vein Thrombosis (DVT) Mother         x2 - both provoked (trauma, surgery)     Diabetes Type 2  Father      Hypertension Father      Breast Cancer Paternal Grandmother      Breast Cancer Sister 45     Myocardial Infarction Sister 50     Deep Vein Thrombosis (DVT) Sister         provoked (surgery)     Lung Cancer Brother         smoker     Hypertension Sister         x2     Cerebrovascular Disease No family hx of      Colon Cancer No family hx of      Ovarian Cancer No family hx of        SOCIAL HISTORY:  Social History     Socioeconomic History     Marital status:     Occupational History     Occupation: Retired - instrument tech   Tobacco Use     Smoking status: Former Smoker     Packs/day: 1.00     Years: 20.00     Pack years: 20.00     Types: Cigarettes     Quit date: 2002     Years since quittin.5     Smokeless tobacco: Never Used   Substance and Sexual Activity     Alcohol use: No     Drug use: No     Sexual activity: Not Currently   Other Topics Concern     Parent/sibling w/ CABG, MI or angioplasty before 65F 55M? No     Bike Helmet Yes     Seat Belt Yes   Social History Narrative    .    Lives with  and two grandchildren.     2 adult kids.    5 grand kids.    Walks regularly.        Review of Systems:  Skin:          Eyes:         ENT:         Respiratory:  Negative for dyspnea on exertion;shortness of breath     Cardiovascular:  Negative for;palpitations;chest pain;edema;syncope or near-syncope;dizziness;lightheadedness;fatigue      Gastroenterology:        Genitourinary:         Musculoskeletal:         Neurologic:         Psychiatric:         Heme/Lymph/Imm:         Endocrine:           Physical Exam:  Vitals: /63   Pulse 50   Temp 97.5  F (36.4  C) (Tympanic)   Wt 79.1 kg (174 lb 6.4 oz)   LMP  (LMP Unknown)   SpO2 100%   BMI 30.89 kg/m      Constitutional:  cooperative, alert and oriented, well developed, well nourished, in no acute distress        Skin:  warm and dry to the touch, no apparent skin lesions or masses noted          Head:  normocephalic, no masses or lesions        Eyes:  pupils equal and round, conjunctivae and lids unremarkable, sclera white, no xanthalasma, EOMS intact, no nystagmus        Lymph:No Cervical lymphadenopathy present     ENT:  no pallor or cyanosis        Neck:  carotid pulses are full and equal bilaterally, JVP normal, no carotid bruit        Respiratory:  normal breath sounds, clear to auscultation, normal A-P diameter, normal symmetry, normal respiratory excursion, no use of accessory muscles          Cardiac: regular rhythm, normal S1/S2, no S3 or S4, apical impulse not displaced, no murmurs, gallops or rubs bradycardic              pulses full and equal, no bruits auscultated                                        GI:  abdomen soft, non-tender, BS normoactive, no mass, no HSM, no bruits        Extremities and Muscular Skeletal:  no deformities, clubbing, cyanosis, erythema observed              Neurological:  no gross motor deficits        Psych:  Alert and Oriented x 3        CC  Sadi Zach Hernandez MD  [address]  [place] [id number]

## 2022-07-19 NOTE — PROGRESS NOTES
Service Date: 2022    Thank you for allowing me to participate in the care of your delightful patient.  As you know, Arnold is a 66-year-old female with a history of persistent atrial fibrillation with symptoms of fluttering and fatigue, whom I had the pleasure of meeting for the first time this past March.  At that time, we discussed rhythm control strategy with antiarrhythmic drug versus an ablation.  The patient opted for the latter, which was performed 3 months ago.  She underwent uneventful isolation of pulmonary veins along the left along with isolation, left atrial posterior wall and empiric ablation of CTI.    A few weeks after that, she did have an episode of atrial fibrillation requiring cardioversion and since then, she has not had anymore episodes.  EKG today demonstrates sinus bradycardia, rate of 50 beats per minute.    Arnold is somewhat pleased so far at the result as she never had any recurrence of atrial arrhythmias since the ablation.  I am somewhat cautiously optimistic as her condition is chronic, especially given the fact that she has been in persistent AFib prior to the ablation.      For now, I would like her to stop the amiodarone, but continue with Xarelto given her CHADS-VASc of 3 and reassess in about 9 months' time when she comes back to see one of advanced practice providers or sooner should the circumstances dictate.  I asked her to call should AFib come back as we may have to restart amiodarone again.    Sadi Hernandez MD        D: 2022   T: 2022   MT: STAN    Name:     ARNOLD GUIDO  MRN:      [id number]        Account:      [id number]   :      1955           Service Date: 2022       Document: [id number]

## 2022-07-19 NOTE — LETTER
7/19/2022    Prema Rodgers MD  [address] [facility] [address]    RE: Ilana Shin       Dear Colleague,     I had the pleasure of seeing Ilana Shin in the [facility].  HPI and Plan:   See dictation  [id number]  Today's clinic visit entailed:  The following tests were independently interpreted by me as noted in my documentation: ecg,   15 minutes spent on the date of the encounter doing chart review   Provider  Link to MDM Help Grid     The level of medical decision making during this visit was of moderate complexity.      No orders of the defined types were placed in this encounter.      No orders of the defined types were placed in this encounter.      Medications Discontinued During This Encounter   Medication Reason     amitriptyline (ELAVIL) 50 MG tablet Therapy completed     omeprazole (PRILOSEC OTC) 20 MG EC tablet Therapy completed         Encounter Diagnosis   Name Primary?     Paroxysmal atrial fibrillation (H)        CURRENT MEDICATIONS:  Current Outpatient Medications   Medication Sig Dispense Refill     acetaminophen (TYLENOL) 325 MG tablet Take 2 tablets (650 mg) by mouth every 4 hours as needed for mild pain 50 tablet 0     amiodarone (PACERONE) 200 MG tablet Take 200mg po every day (starting 4/11) 90 tablet 1     CALCIUM PO        Ferrous Gluconate 240 (27 Fe) MG TABS Take by mouth every other day        fluticasone (FLONASE) 50 MCG/ACT nasal spray Spray 2 sprays into both nostrils daily (Patient taking differently: Spray 2 sprays into both nostrils daily as needed) 16 g 0     fluticasone-salmeterol (ADVAIR) 100-50 MCG/DOSE inhaler Inhale 1 puff into the lungs every 12 hours (Patient taking differently: Inhale 1 puff into the lungs 2 times daily as needed) 3 each 1     levothyroxine (SYNTHROID/LEVOTHROID) 100 MCG tablet TAKE 1 TABLET DAILY (MEDICATION IS BEING FILLED FOR 1 TIME ONLY. NEED TO BE SEEN. IT HAS BEEN MORE THAN 1 YEAR SINCE LAST VISIT) 90 tablet 0     losartan  (COZAAR) 100 MG tablet TAKE 1 TABLET DAILY (MEDICATION IS BEING FILLED FOR 1 TIME ONLY. NEED TO BE SEEN. IT HAS BEEN MORE THAN 1 YEAR SINCE LAST VISIT) 90 tablet 3     rivaroxaban ANTICOAGULANT (XARELTO ANTICOAGULANT) 20 MG TABS tablet Take 1 tablet (20 mg) by mouth daily (with dinner) 90 tablet 0       ALLERGIES     Allergies   Allergen Reactions     Codeine Sulfate GI Disturbance       PAST MEDICAL HISTORY:  Past Medical History:   Diagnosis Date     Benign essential hypertension      Bradycardia      CKD (chronic kidney disease), stage III (H)      Fibromyalgia      Hypothyroidism      Moderate aortic stenosis      Obesity (BMI 30-39.9)      Osteopenia      Pure hypercholesterolemia      Uncomplicated asthma        PAST SURGICAL HISTORY:  Past Surgical History:   Procedure Laterality Date     ARTHROSCOPY KNEE WITH LATERAL MENISCECTOMY Right 3/8/2019    Procedure: RIGHT KNEE  ARTHROSCOPIC, CHONDROPLASTY OF THE MEDIAL CONDRA,  PARTIAL  LATERAL MENISCECTOMY;  Surgeon: Vinay Laird MD;  Location:  OR     BUNIONECTOMY Right 8/2014     BUNIONECTOMY LADONNA, REPAIR HAMMER TOE(S), COMBINED  09/2003    Left foot     CHOLECYSTECTOMY, OPEN  1994     EP ABLATION FOCAL AFIB N/A 3/24/2022    Procedure: Ablation Focal Atrial Fibrillation;  Surgeon: Sadi Hernandez MD;  Location:  HEART CARDIAC CATH LAB     RELEASE CARPAL TUNNEL BILATERAL  1999     TUBAL LIGATION  1984    age 29     ZZC LAPAROSCOPIC JASS EN Y GASTROJEJUNOSTOMY  2006       FAMILY HISTORY:  Family History   Problem Relation Age of Onset     Hypertension Mother      Deep Vein Thrombosis (DVT) Mother         x2 - both provoked (trauma, surgery)     Diabetes Type 2  Father      Hypertension Father      Breast Cancer Paternal Grandmother      Breast Cancer Sister 45     Myocardial Infarction Sister 50     Deep Vein Thrombosis (DVT) Sister         provoked (surgery)     Lung Cancer Brother         smoker     Hypertension Sister         x2     Cerebrovascular  Disease No family hx of      Colon Cancer No family hx of      Ovarian Cancer No family hx of        SOCIAL HISTORY:  Social History     Socioeconomic History     Marital status:    Occupational History     Occupation: Retired - instrument tech   Tobacco Use     Smoking status: Former Smoker     Packs/day: 1.00     Years: 20.00     Pack years: 20.00     Types: Cigarettes     Quit date: 2002     Years since quittin.5     Smokeless tobacco: Never Used   Substance and Sexual Activity     Alcohol use: No     Drug use: No     Sexual activity: Not Currently   Other Topics Concern     Parent/sibling w/ CABG, MI or angioplasty before 65F 55M? No     Bike Helmet Yes     Seat Belt Yes   Social History Narrative    .    Lives with  and two grandchildren.     2 adult kids.    5 grand kids.    Walks regularly.        Review of Systems:  Skin:          Eyes:         ENT:         Respiratory:  Negative for dyspnea on exertion;shortness of breath     Cardiovascular:  Negative for;palpitations;chest pain;edema;syncope or near-syncope;dizziness;lightheadedness;fatigue      Gastroenterology:        Genitourinary:         Musculoskeletal:         Neurologic:         Psychiatric:         Heme/Lymph/Imm:         Endocrine:           Physical Exam:  Vitals: /63   Pulse 50   Temp 97.5  F (36.4  C) (Tympanic)   Wt 79.1 kg (174 lb 6.4 oz)   LMP  (LMP Unknown)   SpO2 100%   BMI 30.89 kg/m      Constitutional:  cooperative, alert and oriented, well developed, well nourished, in no acute distress        Skin:  warm and dry to the touch, no apparent skin lesions or masses noted          Head:  normocephalic, no masses or lesions        Eyes:  pupils equal and round, conjunctivae and lids unremarkable, sclera white, no xanthalasma, EOMS intact, no nystagmus        Lymph:No Cervical lymphadenopathy present     ENT:  no pallor or cyanosis        Neck:  carotid pulses are full and equal bilaterally, JVP  normal, no carotid bruit        Respiratory:  normal breath sounds, clear to auscultation, normal A-P diameter, normal symmetry, normal respiratory excursion, no use of accessory muscles         Cardiac: regular rhythm, normal S1/S2, no S3 or S4, apical impulse not displaced, no murmurs, gallops or rubs bradycardic              pulses full and equal, no bruits auscultated                                        GI:  abdomen soft, non-tender, BS normoactive, no mass, no HSM, no bruits        Extremities and Muscular Skeletal:  no deformities, clubbing, cyanosis, erythema observed              Neurological:  no gross motor deficits        Psych:  Alert and Oriented x 3        CC  Sadi Zach Hernandez MD  [address]  [place] [address]                Service Date: 07/19/2022    Thank you for allowing me to participate in the care of your delightful patient.  As you know, Ilana is a 66-year-old female with a history of persistent atrial fibrillation with symptoms of fluttering and fatigue, whom I had the pleasure of meeting for the first time this past March.  At that time, we discussed rhythm control strategy with antiarrhythmic drug versus an ablation.  The patient opted for the latter, which was performed 3 months ago.  She underwent uneventful isolation of pulmonary veins along the left along with isolation, left atrial posterior wall and empiric ablation of CTI.    A few weeks after that, she did have an episode of atrial fibrillation requiring cardioversion and since then, she has not had anymore episodes.  EKG today demonstrates sinus bradycardia, rate of 50 beats per minute.    Ilana is somewhat pleased so far at the result as she never had any recurrence of atrial arrhythmias since the ablation.  I am somewhat cautiously optimistic as her condition is chronic, especially given the fact that she has been in persistent AFib prior to the ablation.      For now, I would like her to stop the amiodarone, but continue  with Xarelto given her CHADS-VASc of 3 and reassess in about 9 months' time when she comes back to see one of advanced practice providers or sooner should the circumstances dictate.  I asked her to call should AFib come back as we may have to restart amiodarone again.    Sadi Hernandez MD        D: 2022   T: 2022   MT: STAN    Name:     ARNOLD GUIDO  MRN:      [id number]        Account:      [id number]   :      1955           Service Date: 2022       Document: [id number]      Thank you for allowing me to participate in the care of your patient.      Sincerely,     Sadi Fournier MD     [facility]  cc:   Sadi Fournier MD  [address]  [place] [id number]

## 2022-07-27 ENCOUNTER — OFFICE VISIT (OUTPATIENT)
Dept: FAMILY MEDICINE | Facility: CLINIC | Age: 67
End: 2022-07-27
Payer: COMMERCIAL

## 2022-07-27 VITALS
OXYGEN SATURATION: 98 % | HEIGHT: 63 IN | BODY MASS INDEX: 30.83 KG/M2 | TEMPERATURE: 97.6 F | DIASTOLIC BLOOD PRESSURE: 76 MMHG | HEART RATE: 53 BPM | RESPIRATION RATE: 18 BRPM | SYSTOLIC BLOOD PRESSURE: 134 MMHG | WEIGHT: 174 LBS

## 2022-07-27 DIAGNOSIS — I10 BENIGN ESSENTIAL HYPERTENSION: ICD-10-CM

## 2022-07-27 DIAGNOSIS — Z13.220 SCREENING FOR HYPERLIPIDEMIA: ICD-10-CM

## 2022-07-27 DIAGNOSIS — G47.00 INSOMNIA, UNSPECIFIED TYPE: ICD-10-CM

## 2022-07-27 DIAGNOSIS — R00.1 BRADYCARDIA, UNSPECIFIED: ICD-10-CM

## 2022-07-27 DIAGNOSIS — Z98.84 HX OF GASTRIC BYPASS: ICD-10-CM

## 2022-07-27 DIAGNOSIS — I48.0 PAROXYSMAL ATRIAL FIBRILLATION (H): Primary | ICD-10-CM

## 2022-07-27 DIAGNOSIS — I48.91 ATRIAL FIBRILLATION, UNSPECIFIED TYPE (H): ICD-10-CM

## 2022-07-27 DIAGNOSIS — E03.9 HYPOTHYROIDISM, UNSPECIFIED TYPE: ICD-10-CM

## 2022-07-27 DIAGNOSIS — N18.31 STAGE 3A CHRONIC KIDNEY DISEASE (H): ICD-10-CM

## 2022-07-27 DIAGNOSIS — E66.89 OTHER OBESITY: ICD-10-CM

## 2022-07-27 LAB
ANION GAP SERPL CALCULATED.3IONS-SCNC: 4 MMOL/L (ref 3–14)
BUN SERPL-MCNC: 14 MG/DL (ref 7–30)
CALCIUM SERPL-MCNC: 9.2 MG/DL (ref 8.5–10.1)
CHLORIDE BLD-SCNC: 110 MMOL/L (ref 94–109)
CHOLEST SERPL-MCNC: 203 MG/DL
CO2 SERPL-SCNC: 25 MMOL/L (ref 20–32)
CREAT SERPL-MCNC: 1.06 MG/DL (ref 0.52–1.04)
ERYTHROCYTE [DISTWIDTH] IN BLOOD BY AUTOMATED COUNT: 13 % (ref 10–15)
FASTING STATUS PATIENT QL REPORTED: YES
FERRITIN SERPL-MCNC: 18 NG/ML (ref 8–252)
GFR SERPL CREATININE-BSD FRML MDRD: 57 ML/MIN/1.73M2
GLUCOSE BLD-MCNC: 88 MG/DL (ref 70–99)
HCT VFR BLD AUTO: 39.5 % (ref 35–47)
HDLC SERPL-MCNC: 93 MG/DL
HGB BLD-MCNC: 12.6 G/DL (ref 11.7–15.7)
IRON SATN MFR SERPL: 29 % (ref 15–46)
IRON SERPL-MCNC: 127 UG/DL (ref 35–180)
LDLC SERPL CALC-MCNC: 100 MG/DL
MCH RBC QN AUTO: 30.1 PG (ref 26.5–33)
MCHC RBC AUTO-ENTMCNC: 31.9 G/DL (ref 31.5–36.5)
MCV RBC AUTO: 94 FL (ref 78–100)
NONHDLC SERPL-MCNC: 110 MG/DL
PLATELET # BLD AUTO: 158 10E3/UL (ref 150–450)
POTASSIUM BLD-SCNC: 3.9 MMOL/L (ref 3.4–5.3)
RBC # BLD AUTO: 4.19 10E6/UL (ref 3.8–5.2)
SODIUM SERPL-SCNC: 139 MMOL/L (ref 133–144)
TIBC SERPL-MCNC: 441 UG/DL (ref 240–430)
TRIGL SERPL-MCNC: 52 MG/DL
TSH SERPL DL<=0.005 MIU/L-ACNC: 2.31 MU/L (ref 0.4–4)
VIT B12 SERPL-MCNC: 342 PG/ML (ref 232–1245)
WBC # BLD AUTO: 5.1 10E3/UL (ref 4–11)

## 2022-07-27 PROCEDURE — 85027 COMPLETE CBC AUTOMATED: CPT | Performed by: FAMILY MEDICINE

## 2022-07-27 PROCEDURE — 82306 VITAMIN D 25 HYDROXY: CPT | Performed by: FAMILY MEDICINE

## 2022-07-27 PROCEDURE — 82607 VITAMIN B-12: CPT | Performed by: FAMILY MEDICINE

## 2022-07-27 PROCEDURE — 83550 IRON BINDING TEST: CPT | Performed by: FAMILY MEDICINE

## 2022-07-27 PROCEDURE — 80061 LIPID PANEL: CPT | Performed by: FAMILY MEDICINE

## 2022-07-27 PROCEDURE — 82728 ASSAY OF FERRITIN: CPT | Performed by: FAMILY MEDICINE

## 2022-07-27 PROCEDURE — 80048 BASIC METABOLIC PNL TOTAL CA: CPT | Performed by: FAMILY MEDICINE

## 2022-07-27 PROCEDURE — 99214 OFFICE O/P EST MOD 30 MIN: CPT | Performed by: FAMILY MEDICINE

## 2022-07-27 PROCEDURE — 36415 COLL VENOUS BLD VENIPUNCTURE: CPT | Performed by: FAMILY MEDICINE

## 2022-07-27 PROCEDURE — 84443 ASSAY THYROID STIM HORMONE: CPT | Performed by: FAMILY MEDICINE

## 2022-07-27 RX ORDER — LEVOTHYROXINE SODIUM 100 UG/1
TABLET ORAL
Qty: 90 TABLET | Refills: 0 | Status: CANCELLED | OUTPATIENT
Start: 2022-07-27

## 2022-07-27 RX ORDER — LOSARTAN POTASSIUM 100 MG/1
100 TABLET ORAL DAILY
Qty: 90 TABLET | Refills: 3 | Status: SHIPPED | OUTPATIENT
Start: 2022-07-27

## 2022-07-27 RX ORDER — FLUTICASONE PROPIONATE 50 MCG
2 SPRAY, SUSPENSION (ML) NASAL DAILY
Qty: 16 G | Refills: 0 | Status: CANCELLED | OUTPATIENT
Start: 2022-07-27

## 2022-07-27 ASSESSMENT — ASTHMA QUESTIONNAIRES
QUESTION_5 LAST FOUR WEEKS HOW WOULD YOU RATE YOUR ASTHMA CONTROL: COMPLETELY CONTROLLED
QUESTION_3 LAST FOUR WEEKS HOW OFTEN DID YOUR ASTHMA SYMPTOMS (WHEEZING, COUGHING, SHORTNESS OF BREATH, CHEST TIGHTNESS OR PAIN) WAKE YOU UP AT NIGHT OR EARLIER THAN USUAL IN THE MORNING: NOT AT ALL
ACT_TOTALSCORE: 25
QUESTION_4 LAST FOUR WEEKS HOW OFTEN HAVE YOU USED YOUR RESCUE INHALER OR NEBULIZER MEDICATION (SUCH AS ALBUTEROL): NOT AT ALL
ACT_TOTALSCORE: 25
QUESTION_2 LAST FOUR WEEKS HOW OFTEN HAVE YOU HAD SHORTNESS OF BREATH: NOT AT ALL
QUESTION_1 LAST FOUR WEEKS HOW MUCH OF THE TIME DID YOUR ASTHMA KEEP YOU FROM GETTING AS MUCH DONE AT WORK, SCHOOL OR AT HOME: NONE OF THE TIME

## 2022-07-27 ASSESSMENT — PAIN SCALES - GENERAL: PAINLEVEL: NO PAIN (0)

## 2022-07-27 NOTE — PATIENT INSTRUCTIONS
Lab work today.     See a sleep specialist for insomnia.     Refill of losartan provided today.     Will refill thyroid medication after lab work today.

## 2022-07-27 NOTE — PROGRESS NOTES
Assessment & Plan     Paroxysmal atrial fibrillation (H)  - S/p ablation. Bradycardia today in clinic. On Xarelto. Follows with Cardiology.     Benign essential hypertension  - Basic metabolic panel  (Ca, Cl, CO2, Creat, Gluc, K, Na, BUN)  - losartan (COZAAR) 100 MG tablet  Dispense: 90 tablet; Refill: 3    Stage 3a chronic kidney disease (H)  - Basic metabolic panel  (Ca, Cl, CO2, Creat, Gluc, K, Na, BUN)    Hypothyroidism, unspecified type  On Levothyroxine 100 mcg daily. Check labs today.   - TSH with free T4 reflex    Hx of gastric bypass  Due for annual labs. Obtain today.   - Vitamin D Deficiency  - Vitamin B12  - Ferritin  - Iron and iron binding capacity  -CBC   - Vitamin D Deficiency  - Ferritin  - Iron and iron binding capacity    Insomnia, unspecified type  Has tried multiple medications in the past without success. Refer to sleep medicine.   - Adult Sleep Eval & Management  Referral    Screening for hyperlipidemia  Screen today.   - Lipid panel reflex to direct LDL Fasting    The risks, benefits and treatment options of prescribed medications or other treatments have been discussed with the patient. The patient verbalized their understanding and should call or follow up if no improvement or if they develop further problems.    Follow up with PCP for annual visit.     Kaushal Warner DO  [facility]    Cayetano Preciado is a 66 year old, presenting for the following health issues:    Insomnia (Would like to discuss treatment or possible solutions) and Recheck Medication (Needs losartan and levothyroxine refills and lab work. Patient is fasting today. )      History of Present Illness       Reason for visit:  Insomnia  Symptom onset:  More than a month  Symptoms include:  Can t sleep  Symptom progression:  Staying the same  Had these symptoms before:  Yes  Has tried/received treatment for these symptoms:  Yes  Previous treatment was successful:  Yes  Prior treatment  "description:  Sleeping pills  What makes it worse:  No    She eats 2-3 servings of fruits and vegetables daily.She consumes 0 sweetened beverage(s) daily.She exercises with enough effort to increase her heart rate 30 to 60 minutes per day.  She exercises with enough effort to increase her heart rate 5 days per week.   She is taking medications regularly.     Reports long standing sleep issues.   Has tried trazodone, amitriptyline, Ambien in the past.   Issues with falling asleep.     66 year old female who presents to clinic for med check and sleep concerns.       Hypertension Follow-up      Do you check your blood pressure regularly outside of the clinic? Yes     Are you following a low salt diet? Yes    Are your blood pressures ever more than 140 on the top number (systolic) OR more   than 90 on the bottom number (diastolic), for example 140/90? No    Losartan 100 mg daily.   Checks BP at home reports within range.       Hypothyroidism Follow-up      Since last visit, patient describes the following symptoms: Weight stable, no hair loss, no skin changes, no constipation, no loose stools    On Levothyroxine 100 mcg daily.   Due for TSH.       History of gastric bypass in the past.   Due for annual labs.   On calcium supplement, iron supplement.         Review of Systems   Constitutional, HEENT, cardiovascular, pulmonary, gi and gu systems are negative, except as otherwise noted.      Objective    /76   Pulse 53   Temp 97.6  F (36.4  C) (Tympanic)   Resp 18   Ht 1.6 m (5' 3\")   Wt 78.9 kg (174 lb)   LMP  (LMP Unknown)   SpO2 98%   BMI 30.82 kg/m    Body mass index is 30.82 kg/m .  Physical Exam   General: alert, cooperative, no acute distress   Neck: thyroid non-enlarged.   CV: bradycardia   Resp: non-labored breathing, clear to auscultation, no wheezing or rales   Abdomen: Soft, non-tender, no guarding.   Extremities: No peripheral edema, calves non-tender.         .  ..  "

## 2022-07-28 DIAGNOSIS — E03.9 HYPOTHYROIDISM, UNSPECIFIED TYPE: ICD-10-CM

## 2022-07-28 LAB — DEPRECATED CALCIDIOL+CALCIFEROL SERPL-MC: 45 UG/L (ref 20–75)

## 2022-07-28 RX ORDER — LEVOTHYROXINE SODIUM 100 UG/1
TABLET ORAL
Qty: 90 TABLET | Refills: 3 | Status: SHIPPED | OUTPATIENT
Start: 2022-07-28 | End: 2023-08-14

## 2022-08-11 DIAGNOSIS — I48.0 PAROXYSMAL ATRIAL FIBRILLATION (H): ICD-10-CM

## 2022-08-11 NOTE — TELEPHONE ENCOUNTER
LF: --  Qty: 90  Last Cardiology Visit: 7/19/22   Next Scheduled Cardiology Visit: None scheduled    ADDENDUM: [facility] Cardiology Refill Guideline reviewed.  Medication meets criteria for refill. Refills sent. Princess Brown RN Cardiology August 11, 2022, 8:31 AM

## 2022-08-18 ENCOUNTER — ANCILLARY PROCEDURE (OUTPATIENT)
Dept: MAMMOGRAPHY | Facility: CLINIC | Age: 67
End: 2022-08-18
Payer: COMMERCIAL

## 2022-08-18 DIAGNOSIS — Z12.31 VISIT FOR SCREENING MAMMOGRAM: ICD-10-CM

## 2022-08-18 PROCEDURE — 77067 SCR MAMMO BI INCL CAD: CPT | Mod: TC | Performed by: RADIOLOGY

## 2022-08-18 PROCEDURE — 77063 BREAST TOMOSYNTHESIS BI: CPT | Mod: TC | Performed by: RADIOLOGY

## 2022-09-22 DIAGNOSIS — J45.20 MILD INTERMITTENT ASTHMA, UNSPECIFIED WHETHER COMPLICATED: ICD-10-CM

## 2022-09-23 RX ORDER — FLUTICASONE PROPIONATE AND SALMETEROL 100; 50 UG/1; UG/1
POWDER RESPIRATORY (INHALATION)
Qty: 60 EACH | Refills: 0 | OUTPATIENT
Start: 2022-09-23

## 2022-09-23 NOTE — TELEPHONE ENCOUNTER
Routing refill request to provider for review/approval because:  Patient needs to be seen because it has been more than 1 year since last office visit last OV with Dr. Rodgers 8/17/21  Lisa Puente RN

## 2022-09-23 NOTE — TELEPHONE ENCOUNTER
She is overdue for her annual exam. Please ask her to schedule this appointment ASAP. Can refill medication temporarily if she anticipates running out prior to scheduled appointment.     Thank you.     ---    May use any virtual spot for an in-person visit.     Patient may also be seen at noon (arrival time 11:40am) Mondays, Wednesdays, Thursdays, and Fridays OR at 1:00pm (arrival time 12:40pm) on Thursdays (during the huddle).    Please do not schedule in a same day, next day, or hospital follow-up slot.    Okay to double book lunch slot (but patient should still arrive by 11:40am).

## 2022-10-10 PROBLEM — Z98.84 HX OF GASTRIC BYPASS: Status: RESOLVED | Noted: 2022-07-27 | Resolved: 2022-10-10

## 2022-10-11 ENCOUNTER — OFFICE VISIT (OUTPATIENT)
Dept: INTERNAL MEDICINE | Facility: CLINIC | Age: 67
End: 2022-10-11
Payer: COMMERCIAL

## 2022-10-11 VITALS
HEIGHT: 63 IN | WEIGHT: 172.4 LBS | BODY MASS INDEX: 30.55 KG/M2 | SYSTOLIC BLOOD PRESSURE: 122 MMHG | RESPIRATION RATE: 16 BRPM | OXYGEN SATURATION: 98 % | DIASTOLIC BLOOD PRESSURE: 68 MMHG | HEART RATE: 70 BPM

## 2022-10-11 DIAGNOSIS — Z00.00 MEDICARE ANNUAL WELLNESS VISIT, SUBSEQUENT: Primary | ICD-10-CM

## 2022-10-11 DIAGNOSIS — Z23 NEED FOR VACCINATION: ICD-10-CM

## 2022-10-11 DIAGNOSIS — E03.4 HYPOTHYROIDISM DUE TO ACQUIRED ATROPHY OF THYROID: ICD-10-CM

## 2022-10-11 DIAGNOSIS — I10 BENIGN ESSENTIAL HYPERTENSION: ICD-10-CM

## 2022-10-11 DIAGNOSIS — Z12.11 SPECIAL SCREENING FOR MALIGNANT NEOPLASMS, COLON: ICD-10-CM

## 2022-10-11 PROCEDURE — 90677 PCV20 VACCINE IM: CPT | Performed by: INTERNAL MEDICINE

## 2022-10-11 PROCEDURE — 99214 OFFICE O/P EST MOD 30 MIN: CPT | Mod: 25 | Performed by: INTERNAL MEDICINE

## 2022-10-11 PROCEDURE — G0009 ADMIN PNEUMOCOCCAL VACCINE: HCPCS | Performed by: INTERNAL MEDICINE

## 2022-10-11 PROCEDURE — G0439 PPPS, SUBSEQ VISIT: HCPCS | Performed by: INTERNAL MEDICINE

## 2022-10-11 NOTE — PROGRESS NOTES
ASSESSMENT/PLAN                                                       (Z00.00) Medicare annual wellness visit, subsequent  (primary encounter diagnosis)  Comment: PMH, PSH, FH, SH, medications, allergies, immunizations, and preventative health measures reviewed and updated as appropriate.  Plan: see below for plans.      (Z23) Need for vaccination  Plan: Prevnar 20 given today.     (Z12.11) Special screening for malignant neoplasms, colon  Plan: FIT test ordered - patient to pick-up, complete, and mail in when able.     (E03.4) Hypothyroidism due to acquired atrophy of thyroid  Comment: well-controlled on current regimen.      (I10) Benign essential hypertension  Comment: well-controlled on current regimen.      Appropriate preventive services were discussed with this patient, including applicable screening as appropriate for cardiovascular disease, diabetes, osteopenia/osteoporosis, and glaucoma.  As appropriate for age/gender, discussed screening for colorectal cancer, prostate cancer, breast cancer, and cervical cancer. Checklist reviewing preventive services available has been given to the patient.    Reviewed patients plan of care. The Basic Care Plan (routine screening as documented in Health Maintenance) for Ilana Shin meets the Care Plan requirement. This Care Plan has been established and reviewed with the Patient.    Prema Rodgers MD   [address] [address]  T: [phone], F: [phone]    SUBJECTIVE                                                      Ilana Sihn is a very pleasant 67 year old female who presents for her subsequent AWV:    Current providers (other than myself): Hernandez (cardiology)    PMH, PSH, FH, SH, medications, allergies, immunizations, preventative health, and health risk assessment reviewed and updated as appropriate.    Past Medical History:   Diagnosis Date     Benign essential hypertension      Bradycardia      CKD (chronic  kidney disease), stage III (H)      Fibromyalgia      Hypothyroidism      Moderate aortic stenosis      Obesity (BMI 30-39.9)      Osteopenia      Pure hypercholesterolemia      Past Surgical History:   Procedure Laterality Date     ARTHROSCOPY KNEE WITH LATERAL MENISCECTOMY Right 2019    Procedure: RIGHT KNEE  ARTHROSCOPIC, CHONDROPLASTY OF THE MEDIAL CONDRA,  PARTIAL  LATERAL MENISCECTOMY;  Surgeon: Vinay Laird MD;  Location:  OR     BUNIONECTOMY Bilateral      CHOLECYSTECTOMY, OPEN       EP ABLATION FOCAL AFIB N/A 2022    Procedure: Ablation Focal Atrial Fibrillation;  Surgeon: Sadi Hernandez MD;  Location:  HEART CARDIAC CATH LAB     RELEASE CARPAL TUNNEL BILATERAL       JASS EN Y BOWEL  2006     TUBAL LIGATION       Family History   Problem Relation Age of Onset     Hypertension Mother      Deep Vein Thrombosis (DVT) Mother         x2 - both provoked (trauma, surgery)     Diabetes Type 2  Father      Hypertension Father      Breast Cancer Sister 45     Myocardial Infarction Sister 50     Deep Vein Thrombosis (DVT) Sister         provoked (surgery)     Hypertension Sister         x2 sisters     Lung Cancer Brother         smoker     Breast Cancer Paternal Grandmother      Cerebrovascular Disease No family hx of      Colon Cancer No family hx of      Ovarian Cancer No family hx of      Social History     Occupational History     Occupation: Retired - instrument tech   Tobacco Use     Smoking status: Former     Packs/day: 1.00     Years: 20.00     Pack years: 20.00     Types: Cigarettes     Quit date: 2002     Years since quittin.7     Smokeless tobacco: Never   Vaping Use     Vaping Use: Never used   Substance and Sexual Activity     Alcohol use: No     Drug use: No     Sexual activity: Not Currently   Social History Narrative    .    Lives with  and two grandchildren.     2 adult kids.    5 grand kids.    Walks regularly.      Allergies   Allergen Reactions      Codeine Sulfate GI Disturbance     Current Outpatient Medications   Medication Sig     CALCIUM PO      Ferrous Gluconate 240 (27 Fe) MG TABS Take by mouth every other day      fluticasone-salmeterol (ADVAIR) 100-50 MCG/ACT inhaler Inhale 1 puff into the lungs every 12 hours     levothyroxine (SYNTHROID/LEVOTHROID) 100 MCG tablet TAKE 1 TABLET DAILY     losartan (COZAAR) 100 MG tablet Take 1 tablet (100 mg) by mouth daily     rivaroxaban ANTICOAGULANT (XARELTO ANTICOAGULANT) 20 MG TABS tablet Take 1 tablet (20 mg) by mouth daily (with dinner)     Immunization History   Administered Date(s) Administered     COVID-19,PF,Chandrakant 05/09/2021     COVID-19,PF,Pfizer (12+ Yrs) 01/07/2022     Influenza (intradermal) 09/27/2011, 12/04/2012     Influenza Intranasal Vaccine 4 valent (FluMist) 10/01/2015     Influenza Vaccine, 6+MO IM (QUADRIVALENT W/PRESERVATIVES) 10/01/2017     Pneumococcal 23 valent 08/11/2020     Tdap (Adacel,Boostrix) 09/22/2011     Zoster vaccine, live 08/15/2017     PREVENTATIVE HEALTH                                                      BMI: obese  Blood pressure: well-controlled on current regimen   Breast CA screening: up to date   Colon CA screening: up to date   Lung CA screening: patient does not meet screening criteria  Dexa: up to date   Screening cholesterol: up to date   Screening diabetes: up to date   Alcohol misuse screening: alcohol use reviewed - no intervention indicated at this time  Immunizations: reviewed; Prevnar DUE and flu shot and COVID-19 booster DUE - patient declines    HEALTH RISK ASSESSMENT                                                      In general, how would you rate your overall physical health? good  Outside of work, how many days during the week do you exercise? 5 days/week  Outside of work, approximately how many minutes a day do you exercise? 30-45 minutes    If you drink alcohol do you typically have >3 drinks per day or >7 drinks per week? No  Do you usually eat at  "least 4 servings of fruit and vegetables a day, include whole grains & fiber and avoid regularly eating high fat or \"junk\" foods? Yes     Do you have any problems taking medications regularly? No  Do you have any side effects from medications? No    Assistance with daily activities: No    Safety concerns: No    Fall risk assessment: completed today (see ambulatory assessments)    Hearing concerns: No    In the past 6 months, have you been bothered by leaking of urine: No    In general, how would you rate your overall mental or emotional health: good    PHQ-2/PHQ-9 assessment: completed today (see ambulatory assessments)    Additional concerns today: No    OBJECTIVE                                                      /68   Pulse 70   Resp 16   Ht 1.6 m (5' 3\")   Wt 78.2 kg (172 lb 6.4 oz)   LMP  (LMP Unknown)   SpO2 98%   BMI 30.54 kg/m    Constitutional: well-appearing  Head, Ears, and Eyes: normocephalic; normal external auditory canal and pinna; tympanic membranes visualized and normal; normal lids and conjunctivae  Neck: supple, symmetric, no thyromegaly or lymphadenopathy  Respiratory: normal respiratory effort; clear to auscultation bilaterally  Cardiovascular: regular rate and rhythm; no edema  Gastrointestinal: soft, non-tender, and non-distended; no organomegaly or masses  Musculoskeletal: normal gait and station  Psych: normal judgment and insight; normal mood and affect; recent and remote memory intact    Cognitive impairment noted: No  ---  (Note was completed, in part, with [employer] voice-recognition software. Documentation was reviewed, but some grammatical, spelling, and word errors may remain.)  "

## 2022-10-22 ENCOUNTER — HEALTH MAINTENANCE LETTER (OUTPATIENT)
Age: 67
End: 2022-10-22

## 2023-02-09 NOTE — TELEPHONE ENCOUNTER
ambien      Last Written Prescription Date:  12/1/17  Last Fill Quantity: 30,   # refills: 0  Last Office Visit: 11/27/17  Future Office visit:       Routing refill request to provider for review/approval because:  Drug not on the [facility], P or [facility] refill protocol or controlled substance     Estimated Blood Loss (Cc): minimal

## 2023-03-06 ENCOUNTER — E-VISIT (OUTPATIENT)
Dept: INTERNAL MEDICINE | Facility: CLINIC | Age: 68
End: 2023-03-06
Payer: COMMERCIAL

## 2023-03-06 DIAGNOSIS — G47.00 PERSISTENT INSOMNIA: Primary | ICD-10-CM

## 2023-03-06 PROCEDURE — 99421 OL DIG E/M SVC 5-10 MIN: CPT | Performed by: INTERNAL MEDICINE

## 2023-03-06 RX ORDER — ZOLPIDEM TARTRATE 5 MG/1
5 TABLET ORAL
Qty: 30 TABLET | Refills: 5 | Status: SHIPPED | OUTPATIENT
Start: 2023-03-06 | End: 2023-07-23

## 2023-05-18 ENCOUNTER — LAB (OUTPATIENT)
Dept: LAB | Facility: CLINIC | Age: 68
End: 2023-05-18
Payer: COMMERCIAL

## 2023-05-18 DIAGNOSIS — Z12.11 COLON CANCER SCREENING: Primary | ICD-10-CM

## 2023-05-18 LAB — HEMOCCULT STL QL IA: POSITIVE

## 2023-05-18 PROCEDURE — 82274 ASSAY TEST FOR BLOOD FECAL: CPT | Performed by: INTERNAL MEDICINE

## 2023-05-19 DIAGNOSIS — R19.5 POSITIVE FIT (FECAL IMMUNOCHEMICAL TEST): Primary | ICD-10-CM

## 2023-05-23 ENCOUNTER — TELEPHONE (OUTPATIENT)
Dept: CARDIOLOGY | Facility: CLINIC | Age: 68
End: 2023-05-23
Payer: COMMERCIAL

## 2023-05-23 NOTE — TELEPHONE ENCOUNTER
"5/23/23 LOV w Dr Hernandez was 7/19/22, CHADS-VASc was 3 so per\" [facility] procedural Management of Anticoagulation in Afib Patients \" flow sheet, recommendations is to hold Xarelto x 3 days , no bridge, resume the day following the procedure  Left detailed message on secure RN voice mail informing them that 2 day Xarelto hold is ok and pt can resume the day after procedure  Tanna 238 pm   "

## 2023-05-23 NOTE — TELEPHONE ENCOUNTER
Health Call Center    Phone Message    May a detailed message be left on voicemail: yes     Reason for Call: Other: benjie from [facility] is calling to get a hold order on the patients medication for 2 days rivaroxaban ANTICOAGULANT (XARELTO ANTICOAGULANT prior to a colonoscopy 6/26- if unable to do a full 2 day hold they askl to do a 90 day creatine prior, please call advise , thank you.      Action Taken: Other: cardiology    Travel Screening: Not Applicable   Thank you!  [facility]

## 2023-06-26 ENCOUNTER — TRANSFERRED RECORDS (OUTPATIENT)
Dept: HEALTH INFORMATION MANAGEMENT | Facility: CLINIC | Age: 68
End: 2023-06-26
Payer: COMMERCIAL

## 2023-08-07 DIAGNOSIS — I48.0 PAROXYSMAL ATRIAL FIBRILLATION (H): ICD-10-CM

## 2023-08-07 NOTE — TELEPHONE ENCOUNTER
Last Office Visit: 07/19/22 Dr. Hernandez  Next Office Visit: 10/24/23 Dr. Mary Padilla MA Cardiology   8/7/2023 8:13 AM

## 2023-08-07 NOTE — TELEPHONE ENCOUNTER
[facility] Cardiology Refill Guideline reviewed.  Medication meets criteria for refill. Due for follow up. Has 10/24/23 visit with Dr Hernandez. 90 day fill sent. Princess Brown RN Cardiology August 7, 2023, 9:07 AM

## 2023-08-14 ENCOUNTER — MYC MEDICAL ADVICE (OUTPATIENT)
Dept: INTERNAL MEDICINE | Facility: CLINIC | Age: 68
End: 2023-08-14
Payer: COMMERCIAL

## 2023-08-14 DIAGNOSIS — E03.9 HYPOTHYROIDISM, UNSPECIFIED TYPE: ICD-10-CM

## 2023-08-14 RX ORDER — LEVOTHYROXINE SODIUM 100 UG/1
TABLET ORAL
Qty: 90 TABLET | Refills: 3 | Status: SHIPPED | OUTPATIENT
Start: 2023-08-14

## 2023-08-14 NOTE — TELEPHONE ENCOUNTER
"Routing refill request to provider for review/approval because:  Labs not current:  TSH  Patient needs to be seen because it has been more than 1 year since last office visit.        Requested Prescriptions   Pending Prescriptions Disp Refills    levothyroxine (SYNTHROID/LEVOTHROID) 100 MCG tablet [Pharmacy Med Name: L-THYROXINE (SYNTHROID) TABS 100MCG] 90 tablet 3     Sig: TAKE 1 TABLET DAILY       Thyroid Protocol Failed - 8/14/2023  1:08 AM        Failed - Recent (12 mo) or future (30 days) visit within the authorizing provider's specialty     Patient has had an office visit with the authorizing provider or a provider within the authorizing providers department within the previous 12 mos or has a future within next 30 days. See \"Patient Info\" tab in inbasket, or \"Choose Columns\" in Meds & Orders section of the refill encounter.              Failed - Normal TSH on file in past 12 months     Recent Labs   Lab Test 07/27/22  1633   TSH 2.31              Passed - Patient is 12 years or older        Passed - Medication is active on med list        Passed - No active pregnancy on record     If patient is pregnant or has had a positive pregnancy test, please check TSH.          Passed - No positive pregnancy test in past 12 months     If patient is pregnant or has had a positive pregnancy test, please check TSH.                   Sonia Du RN 08/14/23 5:52 PM    "

## 2023-09-07 ENCOUNTER — E-VISIT (OUTPATIENT)
Dept: INTERNAL MEDICINE | Facility: CLINIC | Age: 68
End: 2023-09-07
Payer: COMMERCIAL

## 2023-09-07 DIAGNOSIS — J01.90 ACUTE BACTERIAL SINUSITIS: Primary | ICD-10-CM

## 2023-09-07 DIAGNOSIS — B96.89 ACUTE BACTERIAL SINUSITIS: Primary | ICD-10-CM

## 2023-09-07 PROCEDURE — 99421 OL DIG E/M SVC 5-10 MIN: CPT | Performed by: INTERNAL MEDICINE

## 2023-09-07 NOTE — PATIENT INSTRUCTIONS
Acute Sinusitis: Care Instructions  Overview     Acute sinusitis is an inflammation of the mucous membranes inside the nose and sinuses. Sinuses are the hollow spaces in your skull around the eyes and nose. Acute sinusitis often follows a cold. Acute sinusitis causes thick, discolored mucus that drains from the nose or down the back of the throat. It also can cause pain and pressure in your head and face along with a stuffy or blocked nose.  In most cases, sinusitis gets better on its own in 1 to 2 weeks. But some mild symptoms may last for several weeks. Sometimes antibiotics are needed if there is a bacterial infection.  Follow-up care is a key part of your treatment and safety. Be sure to make and go to all appointments, and call your doctor if you are having problems. It's also a good idea to know your test results and keep a list of the medicines you take.  How can you care for yourself at home?  Use saline (saltwater) nasal washes. This can help keep your nasal passages open and wash out mucus and allergens.  You can buy saline nose washes at a grocery store or drugstore. Follow the instructions on the package.  You can make your own at home. Add 1 teaspoon of non-iodized salt and 1 teaspoon of baking soda to 2 cups of distilled or boiled and cooled water. Fill a squeeze bottle or a nasal cleansing pot (such as a neti pot) with the nasal wash. Then put the tip into your nostril, and lean over the sink. With your mouth open, gently squirt the liquid. Repeat on the other side.  Try a decongestant nasal spray like oxymetazoline (Afrin). Do not use it for more than 3 days in a row. Using it for more than 3 days can make your congestion worse.  If needed, take an over-the-counter pain medicine, such as acetaminophen (Tylenol), ibuprofen (Advil, Motrin), or naproxen (Aleve). Read and follow all instructions on the label.  If the doctor prescribed antibiotics, take them as directed. Do not stop taking them just  "because you feel better. You need to take the full course of antibiotics.  Be careful when taking over-the-counter cold or flu medicines and Tylenol at the same time. Many of these medicines have acetaminophen, which is Tylenol. Read the labels to make sure that you are not taking more than the recommended dose. Too much acetaminophen (Tylenol) can be harmful.  Try a steroid nasal spray. It may help with your symptoms.  Breathe warm, moist air. You can use a steamy shower, a hot bath, or a sink filled with hot water. Avoid cold, dry air. Using a humidifier in your home may help. Follow the directions for cleaning the machine.  When should you call for help?   Call your doctor now or seek immediate medical care if:    You have new or worse swelling, redness, or pain in your face or around one or both of your eyes.     You have double vision or a change in your vision.     You have a high fever.     You have a severe headache and a stiff neck.     You have mental changes, such as feeling confused or much less alert.   Watch closely for changes in your health, and be sure to contact your doctor if:    You are not getting better as expected.   Where can you learn more?  Go to https://www.[employer].net/patiented  Enter I933 in the search box to learn more about \"Acute Sinusitis: Care Instructions.\"  Current as of: March 1, 2023               Content Version: 13.7    7723-3210 [employer].   Care instructions adapted under license by your healthcare professional. If you have questions about a medical condition or this instruction, always ask your healthcare professional. [employer] disclaims any warranty or liability for your use of this information.      Dear Ilana Shin    After reviewing your responses, I've been able to diagnose you with Acute bacterial sinusitis.      Based on your responses and diagnosis, I have prescribed No orders of the defined types were placed in this encounter.   " to treat your symptoms. I have sent this to your pharmacy.?     It is also important to stay well hydrated, get lots of rest and take over-the-counter decongestants,?tylenol?or ibuprofen if you?are able to?take those medications per your primary care provider to help relieve discomfort.?     It is important that you take?all of?your prescribed medication even if your symptoms are improving after a few doses.? Taking?all of?your medicine helps prevent the symptoms from returning.?     If your symptoms worsen, you develop severe headache, vomiting, high fever (>102), or are not improving in 7 days, please contact your primary care provider for an appointment or visit any of our convenient Walk-in Care or Urgent Care Centers to be seen which can be found on our website?here.?     Thanks again for choosing?us?as your health care partner,?   ?  Prema Rodgers MD?

## 2023-09-11 ENCOUNTER — PATIENT OUTREACH (OUTPATIENT)
Dept: CARE COORDINATION | Facility: CLINIC | Age: 68
End: 2023-09-11
Payer: COMMERCIAL

## 2023-09-25 ENCOUNTER — PATIENT OUTREACH (OUTPATIENT)
Dept: CARE COORDINATION | Facility: CLINIC | Age: 68
End: 2023-09-25
Payer: COMMERCIAL

## 2023-10-24 ENCOUNTER — VIRTUAL VISIT (OUTPATIENT)
Dept: CARDIOLOGY | Facility: CLINIC | Age: 68
End: 2023-10-24
Payer: COMMERCIAL

## 2023-10-24 DIAGNOSIS — I48.0 PAROXYSMAL ATRIAL FIBRILLATION (H): Primary | ICD-10-CM

## 2023-10-24 PROCEDURE — 99214 OFFICE O/P EST MOD 30 MIN: CPT | Mod: 95 | Performed by: INTERNAL MEDICINE

## 2023-10-24 NOTE — PROGRESS NOTES
Ilana is a 68 year old who is being evaluated via a billable video visit.      How would you like to obtain your AVS? MyChart  If the video visit is dropped, the invitation should be resent by: Text to cell phone: [phone]  Will anyone else be joining your video visit? No        Video-Visit Details    Type of service:  Video Visit   Video Start Time: 8:12 AM  Video End Time:8:12 AM    Originating Location (pt. Location): Home    Distant Location (provider location):  On-site  Platform used for Video Visit: Amador Roth LPN  A total of 30 minutes was spent with clinic visit, including chart review, including if available echo and cath images, and ECG, Holter, Event and coordinating care    General:  no apparent distress, normal body habitus, sitting upright.  ENT/Mouth:  membranes moist, no nasal discharge.  Normal head shape, no apparent injury or laceration.  Eyes:  no scleral icterus, normal conjunctivae.  No observed jaundice.  Neck:  no apparent neck swelling.   Chest/Lungs:  No breathing difficulty while speaking.  No audible wheezing.  No cough during conversation.  Cardiovascular:  No obviously elevated jugular venous pressure.  No apparent edema bilaterally in LE.   Abdomen:  no obvious abdominal distention.   Extremities:  no apparent cyanosis.  Skin:  no xanthelasma.  No facial lacerations.  Neurologic:  Normal arm motion bilateral, no tremors.    Psychiatric:  Alert, calm demeanor    Thank you for allowing me to participate in the care of your delightful patient.  As you know, Ilana is a 68-year-old female with a history of persistent atrial fibrillation with symptoms of fluttering and fatigue, whom I had the pleasure of meeting for the first time this past March of 2022.  At that time, we discussed rhythm control strategy with antiarrhythmic drug versus an ablation.  The patient opted for the latter, which was performed 3 months later.  She underwent uneventful isolation of pulmonary veins  along the left along with isolation, left atrial posterior wall and empiric ablation of CTI.     A few weeks after that, she did have an episode of atrial fibrillation requiring cardioversion and since then, she has not had anymore episodes.  EKG from last visit which was in 7/2022 demonstrates sinus bradycardia, rate of 50 beats per minute.     I stopped Amiodarone at that time and since then she's been having weekly episode of palpitations lasting up to several hours. She has been able to do most of things she wants to do during these episodes. No bleeding while taking Xarelto. Overall AF burden is much less in comparison to before ablation.    Discussed a few options assuming what she's been having is AF including monitor vs restarting amio or other aad. Ilana opted for the former as these episodes have not bothered her that much. Karolina sob, cp. Reminded her to let us know if sxs worsen otherwise rtc in a year.  Sadi Hernandez MD

## 2023-10-24 NOTE — LETTER
10/24/2023    Melissa Boland MD  [facility] [address] [address]    RE: Ilana Shin       Dear Colleague,     I had the pleasure of seeing Ilana Shin in the [facility].  Ilana is a 68 year old who is being evaluated via a billable video visit.      How would you like to obtain your AVS? MyChart  If the video visit is dropped, the invitation should be resent by: Text to cell phone: [phone]  Will anyone else be joining your video visit? No        Video-Visit Details    Type of service:  Video Visit   Video Start Time: 8:12 AM  Video End Time:8:12 AM    Originating Location (pt. Location): Home    Distant Location (provider location):  On-site  Platform used for Video Visit: Amador Roth LPN  A total of 30 minutes was spent with clinic visit, including chart review, including if available echo and cath images, and ECG, Holter, Event and coordinating care    General:  no apparent distress, normal body habitus, sitting upright.  ENT/Mouth:  membranes moist, no nasal discharge.  Normal head shape, no apparent injury or laceration.  Eyes:  no scleral icterus, normal conjunctivae.  No observed jaundice.  Neck:  no apparent neck swelling.   Chest/Lungs:  No breathing difficulty while speaking.  No audible wheezing.  No cough during conversation.  Cardiovascular:  No obviously elevated jugular venous pressure.  No apparent edema bilaterally in LE.   Abdomen:  no obvious abdominal distention.   Extremities:  no apparent cyanosis.  Skin:  no xanthelasma.  No facial lacerations.  Neurologic:  Normal arm motion bilateral, no tremors.    Psychiatric:  Alert, calm demeanor    Thank you for allowing me to participate in the care of your delightful patient.  As you know, Ilana is a 68-year-old female with a history of persistent atrial fibrillation with symptoms of fluttering and fatigue, whom I had the pleasure of meeting for the first time this past March of 2022.  At that  time, we discussed rhythm control strategy with antiarrhythmic drug versus an ablation.  The patient opted for the latter, which was performed 3 months later.  She underwent uneventful isolation of pulmonary veins along the left along with isolation, left atrial posterior wall and empiric ablation of CTI.     A few weeks after that, she did have an episode of atrial fibrillation requiring cardioversion and since then, she has not had anymore episodes.  EKG from last visit which was in 7/2022 demonstrates sinus bradycardia, rate of 50 beats per minute.     I stopped Amiodarone at that time and since then she's been having weekly episode of palpitations lasting up to several hours. She has been able to do most of things she wants to do during these episodes. No bleeding while taking Xarelto. Overall AF burden is much less in comparison to before ablation.    Discussed a few options assuming what she's been having is AF including monitor vs restarting amio or other aad. Ilana opted for the former as these episodes have not bothered her that much. Denies sob, cp. Reminded her to let us know if sxs worsen otherwise rtc in a year.  Sadi Hernandez MD       Thank you for allowing me to participate in the care of your patient.      Sincerely,     Sadi Fournier MD     [facility]  cc:   No referring provider defined for this encounter.

## 2023-11-06 DIAGNOSIS — I48.0 PAROXYSMAL ATRIAL FIBRILLATION (H): ICD-10-CM

## 2023-11-06 NOTE — TELEPHONE ENCOUNTER
[facility] Cardiology Refill Guideline reviewed.  Medication meets criteria for refill.    Angella Hurtado RN

## 2023-11-06 NOTE — TELEPHONE ENCOUNTER
Last Office Visit: 10/24/23 Hernandez   Next Office Visit: None scheduled   Last Fill Date: Not listed on fax     Bridget Nguyen CMA 11/6/2023 8:15 AM

## 2024-01-14 ENCOUNTER — HEALTH MAINTENANCE LETTER (OUTPATIENT)
Age: 69
End: 2024-01-14

## 2024-04-08 ENCOUNTER — TRANSFERRED RECORDS (OUTPATIENT)
Dept: HEALTH INFORMATION MANAGEMENT | Facility: CLINIC | Age: 69
End: 2024-04-08
Payer: COMMERCIAL

## 2024-10-30 DIAGNOSIS — I48.0 PAROXYSMAL ATRIAL FIBRILLATION (H): ICD-10-CM

## 2025-01-26 ENCOUNTER — HEALTH MAINTENANCE LETTER (OUTPATIENT)
Age: 70
End: 2025-01-26

## (undated) DEVICE — BLADE SHAVER ARTHRO 4.2MM CUDA CVD C9256

## (undated) DEVICE — CAST PADDING 6" UNSTERILE 9046

## (undated) DEVICE — PACK ARTHROSCOPY KNEE SOP15AKFSM

## (undated) DEVICE — CATH RF CARD ABL BID JJ THERMO

## (undated) DEVICE — CATH SOUNDSTAR 8FRX90CM 10439011

## (undated) DEVICE — SUCTION MANIFOLD NEPTUNE SGL

## (undated) DEVICE — GLOVE PROTEXIS POWDER FREE 8.0 ORTHOPEDIC 2D73ET80

## (undated) DEVICE — GLOVE PROTEXIS BLUE W/NEU-THERA 7.5  2D73EB75

## (undated) DEVICE — SOL NACL 0.9% IRRIG 3000ML BAG 2B7477

## (undated) DEVICE — NEEDLE TRANSSEPTAL 18GA X 98CM 86 DEG

## (undated) DEVICE — TUBE SET SMARKABLATE IRRIGATION

## (undated) DEVICE — PATCH CARTO 3 EXTERNAL REFERENCE 3D MAPPING CREFP6

## (undated) DEVICE — ESU ARTHROWAND 90DEG RF AMBIENT SUPER TURBOVAC ASHA4250-01

## (undated) DEVICE — DRAPE SHEET REV FOLD 3/4 9349

## (undated) DEVICE — TOURNIQUET CUFF 30" REPRO BLUE 60-7070-105

## (undated) DEVICE — INTRODUCER SHEATH FAST-CATH 9FRX12CM 406116

## (undated) DEVICE — PREP CHLORAPREP 26ML TINTED ORANGE  260815

## (undated) DEVICE — CATH NAV PENTARAY F CURVE

## (undated) DEVICE — INTRODUCER SHEATH VASC CATH 8.5FR CARTO GIDE STH MED D138502

## (undated) DEVICE — GLOVE PROTEXIS BLUE W/NEU-THERA 8.0  2D73EB80

## (undated) DEVICE — CATH EP CATH EP REPRO DAIG RSPN FX CRV DX EP C

## (undated) DEVICE — GLOVE PROTEXIS POWDER FREE 7.5 ORTHOPEDIC 2D73ET75

## (undated) DEVICE — DEFIB PRO-PADZ LVP LQD GEL ADULT 8900-2105-01

## (undated) DEVICE — PACK EP SRG PROC LF DISP SAN32EPFSR

## (undated) DEVICE — CATH EP 7FR X 115CM DECANAV CA

## (undated) DEVICE — TUBING ARTHRO CONMED/LINVATEC PUMP BLUE INFLOW 10K100

## (undated) DEVICE — SU ETHILON 3-0 PS-2 18" 1669H

## (undated) RX ORDER — LIDOCAINE HYDROCHLORIDE 10 MG/ML
INJECTION, SOLUTION EPIDURAL; INFILTRATION; INTRACAUDAL; PERINEURAL
Status: DISPENSED
Start: 2022-03-24

## (undated) RX ORDER — ROPIVACAINE HYDROCHLORIDE 5 MG/ML
INJECTION, SOLUTION EPIDURAL; INFILTRATION; PERINEURAL
Status: DISPENSED
Start: 2019-03-08

## (undated) RX ORDER — ACETAMINOPHEN 500 MG
TABLET ORAL
Status: DISPENSED
Start: 2019-03-08

## (undated) RX ORDER — ONDANSETRON 2 MG/ML
INJECTION INTRAMUSCULAR; INTRAVENOUS
Status: DISPENSED
Start: 2019-03-08

## (undated) RX ORDER — FENTANYL CITRATE 50 UG/ML
INJECTION, SOLUTION INTRAMUSCULAR; INTRAVENOUS
Status: DISPENSED
Start: 2019-03-08

## (undated) RX ORDER — GLYCOPYRROLATE 0.2 MG/ML
INJECTION, SOLUTION INTRAMUSCULAR; INTRAVENOUS
Status: DISPENSED
Start: 2019-03-08

## (undated) RX ORDER — HEPARIN SODIUM 1000 [USP'U]/ML
INJECTION, SOLUTION INTRAVENOUS; SUBCUTANEOUS
Status: DISPENSED
Start: 2022-03-24

## (undated) RX ORDER — PROPOFOL 10 MG/ML
INJECTION, EMULSION INTRAVENOUS
Status: DISPENSED
Start: 2019-03-08

## (undated) RX ORDER — CEFAZOLIN SODIUM 2 G/100ML
INJECTION, SOLUTION INTRAVENOUS
Status: DISPENSED
Start: 2019-03-08

## (undated) RX ORDER — LIDOCAINE HYDROCHLORIDE 20 MG/ML
INJECTION, SOLUTION EPIDURAL; INFILTRATION; INTRACAUDAL; PERINEURAL
Status: DISPENSED
Start: 2019-03-08

## (undated) RX ORDER — NEOSTIGMINE METHYLSULFATE 1 MG/ML
VIAL (ML) INJECTION
Status: DISPENSED
Start: 2019-03-08

## (undated) RX ORDER — HEPARIN SODIUM 200 [USP'U]/100ML
INJECTION, SOLUTION INTRAVENOUS
Status: DISPENSED
Start: 2022-03-24

## (undated) RX ORDER — PROTAMINE SULFATE 10 MG/ML
INJECTION, SOLUTION INTRAVENOUS
Status: DISPENSED
Start: 2022-03-24

## (undated) RX ORDER — OXYCODONE HYDROCHLORIDE 5 MG/1
TABLET ORAL
Status: DISPENSED
Start: 2019-03-08

## (undated) RX ORDER — DEXAMETHASONE SODIUM PHOSPHATE 4 MG/ML
INJECTION, SOLUTION INTRA-ARTICULAR; INTRALESIONAL; INTRAMUSCULAR; INTRAVENOUS; SOFT TISSUE
Status: DISPENSED
Start: 2019-03-08

## (undated) RX ORDER — FENTANYL CITRATE 50 UG/ML
INJECTION, SOLUTION INTRAMUSCULAR; INTRAVENOUS
Status: DISPENSED
Start: 2022-03-24